# Patient Record
Sex: FEMALE | Race: WHITE | Employment: OTHER | URBAN - METROPOLITAN AREA
[De-identification: names, ages, dates, MRNs, and addresses within clinical notes are randomized per-mention and may not be internally consistent; named-entity substitution may affect disease eponyms.]

---

## 2017-03-15 ENCOUNTER — PAT TELEPHONE (OUTPATIENT)
Dept: PREADMISSION TESTING | Age: 72
End: 2017-03-15

## 2017-03-15 VITALS — BODY MASS INDEX: 38.57 KG/M2 | HEIGHT: 66 IN | WEIGHT: 240 LBS

## 2017-03-16 ENCOUNTER — SURG/PROC ORDERS (OUTPATIENT)
Dept: ANESTHESIOLOGY | Age: 72
End: 2017-03-16

## 2017-03-16 RX ORDER — SODIUM CHLORIDE 0.9 % (FLUSH) 0.9 %
10 SYRINGE (ML) INJECTION PRN
Status: CANCELLED | OUTPATIENT
Start: 2017-03-16

## 2017-03-16 RX ORDER — SODIUM CHLORIDE 0.9 % (FLUSH) 0.9 %
10 SYRINGE (ML) INJECTION EVERY 12 HOURS SCHEDULED
Status: CANCELLED | OUTPATIENT
Start: 2017-03-16

## 2017-03-16 RX ORDER — SODIUM CHLORIDE 9 MG/ML
INJECTION, SOLUTION INTRAVENOUS CONTINUOUS
Status: CANCELLED | OUTPATIENT
Start: 2017-03-16

## 2017-03-17 ENCOUNTER — HOSPITAL ENCOUNTER (OUTPATIENT)
Dept: ENDOSCOPY | Age: 72
Discharge: HOME | End: 2017-03-17
Attending: INTERNAL MEDICINE | Admitting: INTERNAL MEDICINE

## 2017-03-17 VITALS
BODY MASS INDEX: 38.57 KG/M2 | OXYGEN SATURATION: 96 % | WEIGHT: 240 LBS | DIASTOLIC BLOOD PRESSURE: 68 MMHG | TEMPERATURE: 97.1 F | HEIGHT: 66 IN | SYSTOLIC BLOOD PRESSURE: 133 MMHG | HEART RATE: 69 BPM | RESPIRATION RATE: 17 BRPM

## 2017-03-17 DIAGNOSIS — K80.50 CALCULUS OF BILE DUCT WITHOUT CHOLECYSTITIS AND WITHOUT OBSTRUCTION: ICD-10-CM

## 2017-03-17 RX ORDER — SODIUM CHLORIDE 0.9 % (FLUSH) 0.9 %
10 SYRINGE (ML) INJECTION EVERY 12 HOURS SCHEDULED
Status: DISCONTINUED | OUTPATIENT
Start: 2017-03-17 | End: 2017-03-18 | Stop reason: HOSPADM

## 2017-03-17 RX ORDER — FENTANYL CITRATE 50 UG/ML
25 INJECTION, SOLUTION INTRAMUSCULAR; INTRAVENOUS EVERY 5 MIN PRN
Status: DISCONTINUED | OUTPATIENT
Start: 2017-03-17 | End: 2017-03-18 | Stop reason: HOSPADM

## 2017-03-17 RX ORDER — FENTANYL CITRATE 50 UG/ML
50 INJECTION, SOLUTION INTRAMUSCULAR; INTRAVENOUS EVERY 5 MIN PRN
Status: DISCONTINUED | OUTPATIENT
Start: 2017-03-17 | End: 2017-03-18 | Stop reason: HOSPADM

## 2017-03-17 RX ORDER — MEPERIDINE HYDROCHLORIDE 25 MG/ML
12.5 INJECTION INTRAMUSCULAR; INTRAVENOUS; SUBCUTANEOUS EVERY 5 MIN PRN
Status: DISCONTINUED | OUTPATIENT
Start: 2017-03-17 | End: 2017-03-18 | Stop reason: HOSPADM

## 2017-03-17 RX ORDER — SODIUM CHLORIDE 9 MG/ML
INJECTION, SOLUTION INTRAVENOUS CONTINUOUS
Status: DISCONTINUED | OUTPATIENT
Start: 2017-03-17 | End: 2017-03-18 | Stop reason: HOSPADM

## 2017-03-17 RX ORDER — SODIUM CHLORIDE 0.9 % (FLUSH) 0.9 %
10 SYRINGE (ML) INJECTION PRN
Status: DISCONTINUED | OUTPATIENT
Start: 2017-03-17 | End: 2017-03-18 | Stop reason: HOSPADM

## 2017-03-17 RX ORDER — MORPHINE SULFATE 2 MG/ML
2 INJECTION, SOLUTION INTRAMUSCULAR; INTRAVENOUS EVERY 5 MIN PRN
Status: DISCONTINUED | OUTPATIENT
Start: 2017-03-17 | End: 2017-03-18 | Stop reason: HOSPADM

## 2017-03-17 RX ORDER — OXYCODONE HYDROCHLORIDE AND ACETAMINOPHEN 5; 325 MG/1; MG/1
1 TABLET ORAL PRN
Status: ACTIVE | OUTPATIENT
Start: 2017-03-17 | End: 2017-03-17

## 2017-03-17 RX ORDER — MORPHINE SULFATE 2 MG/ML
1 INJECTION, SOLUTION INTRAMUSCULAR; INTRAVENOUS EVERY 5 MIN PRN
Status: DISCONTINUED | OUTPATIENT
Start: 2017-03-17 | End: 2017-03-18 | Stop reason: HOSPADM

## 2017-03-17 RX ORDER — OXYCODONE HYDROCHLORIDE AND ACETAMINOPHEN 5; 325 MG/1; MG/1
2 TABLET ORAL PRN
Status: ACTIVE | OUTPATIENT
Start: 2017-03-17 | End: 2017-03-17

## 2017-03-17 RX ORDER — ONDANSETRON 2 MG/ML
4 INJECTION INTRAMUSCULAR; INTRAVENOUS
Status: COMPLETED | OUTPATIENT
Start: 2017-03-17 | End: 2017-03-17

## 2017-03-17 RX ADMIN — FENTANYL CITRATE 25 MCG: 50 INJECTION, SOLUTION INTRAMUSCULAR; INTRAVENOUS at 14:14

## 2017-03-17 RX ADMIN — ONDANSETRON 4 MG: 2 INJECTION INTRAMUSCULAR; INTRAVENOUS at 14:05

## 2017-03-17 RX ADMIN — SODIUM CHLORIDE: 9 INJECTION, SOLUTION INTRAVENOUS at 12:00

## 2017-03-17 RX ADMIN — FENTANYL CITRATE 25 MCG: 50 INJECTION, SOLUTION INTRAMUSCULAR; INTRAVENOUS at 14:27

## 2017-03-17 ASSESSMENT — PAIN DESCRIPTION - DESCRIPTORS
DESCRIPTORS: BURNING
DESCRIPTORS: BURNING

## 2017-03-17 ASSESSMENT — PAIN SCALES - GENERAL
PAINLEVEL_OUTOF10: 0
PAINLEVEL_OUTOF10: 5
PAINLEVEL_OUTOF10: 4
PAINLEVEL_OUTOF10: 4
PAINLEVEL_OUTOF10: 3
PAINLEVEL_OUTOF10: 0

## 2017-03-17 ASSESSMENT — ENCOUNTER SYMPTOMS: SHORTNESS OF BREATH: 1

## 2017-03-17 ASSESSMENT — PAIN DESCRIPTION - PROGRESSION
CLINICAL_PROGRESSION: GRADUALLY IMPROVING
CLINICAL_PROGRESSION: GRADUALLY IMPROVING

## 2017-03-17 ASSESSMENT — PAIN DESCRIPTION - LOCATION: LOCATION: ABDOMEN;CHEST

## 2017-03-17 ASSESSMENT — PAIN - FUNCTIONAL ASSESSMENT: PAIN_FUNCTIONAL_ASSESSMENT: 0-10

## 2017-03-17 ASSESSMENT — PAIN DESCRIPTION - PAIN TYPE: TYPE: ACUTE PAIN;SURGICAL PAIN

## 2017-04-10 DIAGNOSIS — I10 ESSENTIAL HYPERTENSION: ICD-10-CM

## 2017-04-10 DIAGNOSIS — R06.02 SHORTNESS OF BREATH: ICD-10-CM

## 2017-04-10 RX ORDER — FUROSEMIDE 40 MG/1
TABLET ORAL
Qty: 90 TABLET | Refills: 0 | Status: SHIPPED | OUTPATIENT
Start: 2017-04-10 | End: 2017-05-24 | Stop reason: SDUPTHER

## 2017-04-14 RX ORDER — SERTRALINE HYDROCHLORIDE 100 MG/1
TABLET, FILM COATED ORAL
Qty: 30 TABLET | Refills: 0 | Status: SHIPPED | OUTPATIENT
Start: 2017-04-14 | End: 2017-05-24 | Stop reason: SDUPTHER

## 2017-04-20 DIAGNOSIS — E03.8 OTHER SPECIFIED HYPOTHYROIDISM: ICD-10-CM

## 2017-04-20 RX ORDER — LEVOTHYROXINE SODIUM 112 UG/1
TABLET ORAL
Qty: 30 TABLET | Refills: 0 | Status: SHIPPED | OUTPATIENT
Start: 2017-04-20 | End: 2017-05-24 | Stop reason: SDUPTHER

## 2017-04-26 DIAGNOSIS — I10 ESSENTIAL HYPERTENSION: ICD-10-CM

## 2017-04-26 RX ORDER — METOPROLOL TARTRATE 50 MG/1
TABLET, FILM COATED ORAL
Qty: 90 TABLET | Refills: 0 | Status: SHIPPED | OUTPATIENT
Start: 2017-04-26 | End: 2017-05-24 | Stop reason: SDUPTHER

## 2017-04-26 RX ORDER — LISINOPRIL 20 MG/1
TABLET ORAL
Qty: 90 TABLET | Refills: 0 | Status: SHIPPED | OUTPATIENT
Start: 2017-04-26 | End: 2017-05-24 | Stop reason: SDUPTHER

## 2017-05-24 ENCOUNTER — OFFICE VISIT (OUTPATIENT)
Dept: INTERNAL MEDICINE CLINIC | Age: 72
End: 2017-05-24

## 2017-05-24 VITALS
BODY MASS INDEX: 38.35 KG/M2 | SYSTOLIC BLOOD PRESSURE: 118 MMHG | HEART RATE: 88 BPM | WEIGHT: 238.6 LBS | HEIGHT: 66 IN | DIASTOLIC BLOOD PRESSURE: 80 MMHG

## 2017-05-24 DIAGNOSIS — E03.8 OTHER SPECIFIED HYPOTHYROIDISM: ICD-10-CM

## 2017-05-24 DIAGNOSIS — I10 ESSENTIAL HYPERTENSION: Primary | ICD-10-CM

## 2017-05-24 DIAGNOSIS — R06.02 SHORTNESS OF BREATH: ICD-10-CM

## 2017-05-24 PROCEDURE — 99214 OFFICE O/P EST MOD 30 MIN: CPT | Performed by: INTERNAL MEDICINE

## 2017-05-24 RX ORDER — LEVOTHYROXINE SODIUM 112 UG/1
112 TABLET ORAL DAILY
Qty: 90 TABLET | Refills: 1 | Status: SHIPPED | OUTPATIENT
Start: 2017-05-24 | End: 2017-05-24 | Stop reason: SDUPTHER

## 2017-05-24 RX ORDER — LISINOPRIL 20 MG/1
20 TABLET ORAL DAILY
Qty: 90 TABLET | Refills: 1 | Status: SHIPPED | OUTPATIENT
Start: 2017-05-24 | End: 2017-07-25 | Stop reason: SDUPTHER

## 2017-05-24 RX ORDER — AMITRIPTYLINE HYDROCHLORIDE 25 MG/1
TABLET, FILM COATED ORAL
Qty: 90 TABLET | Refills: 0 | Status: SHIPPED | OUTPATIENT
Start: 2017-05-24 | End: 2018-03-13 | Stop reason: SDUPTHER

## 2017-05-24 RX ORDER — METOPROLOL TARTRATE 50 MG/1
50 TABLET, FILM COATED ORAL DAILY
Qty: 90 TABLET | Refills: 1 | Status: SHIPPED | OUTPATIENT
Start: 2017-05-24 | End: 2017-07-25 | Stop reason: SDUPTHER

## 2017-05-24 RX ORDER — SERTRALINE HYDROCHLORIDE 100 MG/1
100 TABLET, FILM COATED ORAL DAILY
Qty: 90 TABLET | Refills: 1 | Status: SHIPPED | OUTPATIENT
Start: 2017-05-24 | End: 2018-03-13 | Stop reason: SDUPTHER

## 2017-05-24 RX ORDER — FUROSEMIDE 40 MG/1
40 TABLET ORAL DAILY
Qty: 90 TABLET | Refills: 1 | Status: SHIPPED | OUTPATIENT
Start: 2017-05-24 | End: 2017-07-09 | Stop reason: SDUPTHER

## 2017-05-24 RX ORDER — CLONAZEPAM 1 MG/1
1 TABLET ORAL NIGHTLY PRN
Qty: 90 TABLET | Refills: 1 | Status: SHIPPED | OUTPATIENT
Start: 2017-05-24 | End: 2018-03-13 | Stop reason: SDUPTHER

## 2017-05-24 RX ORDER — LEVOTHYROXINE SODIUM 112 UG/1
112 TABLET ORAL DAILY
Qty: 90 TABLET | Refills: 1 | Status: SHIPPED | OUTPATIENT
Start: 2017-05-24 | End: 2018-03-13 | Stop reason: SDUPTHER

## 2017-05-24 ASSESSMENT — PATIENT HEALTH QUESTIONNAIRE - PHQ9
1. LITTLE INTEREST OR PLEASURE IN DOING THINGS: 0
SUM OF ALL RESPONSES TO PHQ QUESTIONS 1-9: 0
SUM OF ALL RESPONSES TO PHQ9 QUESTIONS 1 & 2: 0
2. FEELING DOWN, DEPRESSED OR HOPELESS: 0

## 2017-05-25 LAB
ANION GAP SERPL CALCULATED.3IONS-SCNC: 16 MMOL/L (ref 3–16)
BUN BLDV-MCNC: 20 MG/DL (ref 7–20)
CALCIUM SERPL-MCNC: 9.5 MG/DL (ref 8.3–10.6)
CHLORIDE BLD-SCNC: 100 MMOL/L (ref 99–110)
CO2: 26 MMOL/L (ref 21–32)
CREAT SERPL-MCNC: 1.3 MG/DL (ref 0.6–1.2)
GFR AFRICAN AMERICAN: 49
GFR NON-AFRICAN AMERICAN: 40
GLUCOSE BLD-MCNC: 79 MG/DL (ref 70–99)
POTASSIUM SERPL-SCNC: 5 MMOL/L (ref 3.5–5.1)
SODIUM BLD-SCNC: 142 MMOL/L (ref 136–145)

## 2017-05-26 ENCOUNTER — TELEPHONE (OUTPATIENT)
Dept: RHEUMATOLOGY | Age: 72
End: 2017-05-26

## 2017-06-04 ASSESSMENT — ENCOUNTER SYMPTOMS
EYES NEGATIVE: 1
ALLERGIC/IMMUNOLOGIC NEGATIVE: 1
GASTROINTESTINAL NEGATIVE: 1
RESPIRATORY NEGATIVE: 1

## 2017-06-05 RX ORDER — POTASSIUM CHLORIDE 20 MEQ/1
TABLET, EXTENDED RELEASE ORAL
Qty: 90 TABLET | Refills: 1 | Status: SHIPPED | OUTPATIENT
Start: 2017-06-05 | End: 2017-12-02 | Stop reason: SDUPTHER

## 2017-07-09 DIAGNOSIS — R06.02 SHORTNESS OF BREATH: ICD-10-CM

## 2017-07-09 DIAGNOSIS — I10 ESSENTIAL HYPERTENSION: ICD-10-CM

## 2017-07-10 RX ORDER — FUROSEMIDE 40 MG/1
TABLET ORAL
Qty: 90 TABLET | Refills: 0 | Status: SHIPPED | OUTPATIENT
Start: 2017-07-10 | End: 2017-10-12 | Stop reason: SDUPTHER

## 2017-07-25 DIAGNOSIS — I10 ESSENTIAL HYPERTENSION: ICD-10-CM

## 2017-07-25 RX ORDER — METOPROLOL TARTRATE 50 MG/1
TABLET, FILM COATED ORAL
Qty: 90 TABLET | Refills: 0 | Status: SHIPPED | OUTPATIENT
Start: 2017-07-25 | End: 2017-10-23 | Stop reason: SDUPTHER

## 2017-07-25 RX ORDER — LISINOPRIL 20 MG/1
TABLET ORAL
Qty: 90 TABLET | Refills: 0 | Status: SHIPPED | OUTPATIENT
Start: 2017-07-25 | End: 2017-10-23 | Stop reason: SDUPTHER

## 2017-10-23 DIAGNOSIS — I10 ESSENTIAL HYPERTENSION: ICD-10-CM

## 2017-10-23 RX ORDER — METOPROLOL TARTRATE 50 MG/1
TABLET, FILM COATED ORAL
Qty: 30 TABLET | Refills: 0 | Status: SHIPPED | OUTPATIENT
Start: 2017-10-23 | End: 2018-03-13 | Stop reason: SDUPTHER

## 2017-10-23 RX ORDER — LISINOPRIL 20 MG/1
TABLET ORAL
Qty: 30 TABLET | Refills: 0 | Status: SHIPPED | OUTPATIENT
Start: 2017-10-23 | End: 2017-11-29 | Stop reason: SDUPTHER

## 2017-11-29 DIAGNOSIS — I10 ESSENTIAL HYPERTENSION: ICD-10-CM

## 2017-11-29 RX ORDER — LISINOPRIL 20 MG/1
TABLET ORAL
Qty: 30 TABLET | Refills: 0 | Status: SHIPPED | OUTPATIENT
Start: 2017-11-29 | End: 2018-03-13 | Stop reason: SDUPTHER

## 2017-12-04 RX ORDER — POTASSIUM CHLORIDE 20 MEQ/1
TABLET, EXTENDED RELEASE ORAL
Qty: 30 TABLET | Refills: 0 | Status: ON HOLD | OUTPATIENT
Start: 2017-12-04 | End: 2018-03-27 | Stop reason: SDDI

## 2018-03-13 ENCOUNTER — OFFICE VISIT (OUTPATIENT)
Dept: INTERNAL MEDICINE CLINIC | Age: 73
End: 2018-03-13

## 2018-03-13 VITALS
WEIGHT: 248 LBS | HEIGHT: 66 IN | BODY MASS INDEX: 39.86 KG/M2 | DIASTOLIC BLOOD PRESSURE: 84 MMHG | SYSTOLIC BLOOD PRESSURE: 110 MMHG | HEART RATE: 68 BPM

## 2018-03-13 DIAGNOSIS — I48.91 RAPID ATRIAL FIBRILLATION (HCC): Primary | ICD-10-CM

## 2018-03-13 DIAGNOSIS — F32.2 SEVERE SINGLE CURRENT EPISODE OF MAJOR DEPRESSIVE DISORDER, WITHOUT PSYCHOTIC FEATURES (HCC): Chronic | ICD-10-CM

## 2018-03-13 DIAGNOSIS — F41.9 CHRONIC ANXIETY: ICD-10-CM

## 2018-03-13 DIAGNOSIS — R06.02 SHORTNESS OF BREATH: ICD-10-CM

## 2018-03-13 DIAGNOSIS — I10 ESSENTIAL HYPERTENSION: ICD-10-CM

## 2018-03-13 DIAGNOSIS — I49.9 IRREGULAR HEART BEAT: ICD-10-CM

## 2018-03-13 DIAGNOSIS — E03.9 ACQUIRED HYPOTHYROIDISM: ICD-10-CM

## 2018-03-13 LAB
A/G RATIO: 2.1 (ref 1.1–2.2)
ALBUMIN SERPL-MCNC: 4.7 G/DL (ref 3.4–5)
ALP BLD-CCNC: 62 U/L (ref 40–129)
ALT SERPL-CCNC: 17 U/L (ref 10–40)
ANION GAP SERPL CALCULATED.3IONS-SCNC: 19 MMOL/L (ref 3–16)
AST SERPL-CCNC: 22 U/L (ref 15–37)
BASOPHILS ABSOLUTE: 0.1 K/UL (ref 0–0.2)
BASOPHILS RELATIVE PERCENT: 0.8 %
BILIRUB SERPL-MCNC: 0.6 MG/DL (ref 0–1)
BUN BLDV-MCNC: 23 MG/DL (ref 7–20)
CALCIUM SERPL-MCNC: 9.6 MG/DL (ref 8.3–10.6)
CHLORIDE BLD-SCNC: 98 MMOL/L (ref 99–110)
CO2: 24 MMOL/L (ref 21–32)
CREAT SERPL-MCNC: 1.2 MG/DL (ref 0.6–1.2)
EOSINOPHILS ABSOLUTE: 0.3 K/UL (ref 0–0.6)
EOSINOPHILS RELATIVE PERCENT: 4.2 %
GFR AFRICAN AMERICAN: 53
GFR NON-AFRICAN AMERICAN: 44
GLOBULIN: 2.2 G/DL
GLUCOSE BLD-MCNC: 105 MG/DL (ref 70–99)
HCT VFR BLD CALC: 44.1 % (ref 36–48)
HEMOGLOBIN: 14.6 G/DL (ref 12–16)
LYMPHOCYTES ABSOLUTE: 1.3 K/UL (ref 1–5.1)
LYMPHOCYTES RELATIVE PERCENT: 18.1 %
MCH RBC QN AUTO: 29.3 PG (ref 26–34)
MCHC RBC AUTO-ENTMCNC: 33 G/DL (ref 31–36)
MCV RBC AUTO: 88.8 FL (ref 80–100)
MONOCYTES ABSOLUTE: 0.7 K/UL (ref 0–1.3)
MONOCYTES RELATIVE PERCENT: 9.6 %
NEUTROPHILS ABSOLUTE: 4.9 K/UL (ref 1.7–7.7)
NEUTROPHILS RELATIVE PERCENT: 67.3 %
PDW BLD-RTO: 14.5 % (ref 12.4–15.4)
PLATELET # BLD: 246 K/UL (ref 135–450)
PMV BLD AUTO: 9.6 FL (ref 5–10.5)
POTASSIUM SERPL-SCNC: 4.8 MMOL/L (ref 3.5–5.1)
PRO-BNP: 3283 PG/ML (ref 0–449)
RBC # BLD: 4.97 M/UL (ref 4–5.2)
SODIUM BLD-SCNC: 141 MMOL/L (ref 136–145)
TOTAL PROTEIN: 6.9 G/DL (ref 6.4–8.2)
TSH SERPL DL<=0.05 MIU/L-ACNC: 3.96 UIU/ML (ref 0.27–4.2)
WBC # BLD: 7.2 K/UL (ref 4–11)

## 2018-03-13 PROCEDURE — 99214 OFFICE O/P EST MOD 30 MIN: CPT | Performed by: NURSE PRACTITIONER

## 2018-03-13 PROCEDURE — 93000 ELECTROCARDIOGRAM COMPLETE: CPT | Performed by: NURSE PRACTITIONER

## 2018-03-13 RX ORDER — METOPROLOL TARTRATE 50 MG/1
50 TABLET, FILM COATED ORAL DAILY
Qty: 90 TABLET | Refills: 1 | Status: SHIPPED | OUTPATIENT
Start: 2018-03-13 | End: 2018-08-07 | Stop reason: SDUPTHER

## 2018-03-13 RX ORDER — SERTRALINE HYDROCHLORIDE 100 MG/1
100 TABLET, FILM COATED ORAL DAILY
Qty: 90 TABLET | Refills: 1 | Status: SHIPPED | OUTPATIENT
Start: 2018-03-13 | End: 2018-08-07 | Stop reason: SDUPTHER

## 2018-03-13 RX ORDER — AMITRIPTYLINE HYDROCHLORIDE 25 MG/1
25 TABLET, FILM COATED ORAL NIGHTLY
Qty: 90 TABLET | Refills: 1 | Status: SHIPPED | OUTPATIENT
Start: 2018-03-13 | End: 2019-05-15 | Stop reason: SDUPTHER

## 2018-03-13 RX ORDER — CLONAZEPAM 1 MG/1
1 TABLET ORAL NIGHTLY PRN
Qty: 30 TABLET | Refills: 2 | Status: SHIPPED | OUTPATIENT
Start: 2018-03-13 | End: 2018-08-07 | Stop reason: SDUPTHER

## 2018-03-13 RX ORDER — LISINOPRIL 20 MG/1
20 TABLET ORAL DAILY
Qty: 90 TABLET | Refills: 1 | Status: SHIPPED | OUTPATIENT
Start: 2018-03-13 | End: 2018-08-07 | Stop reason: SDUPTHER

## 2018-03-13 RX ORDER — LEVOTHYROXINE SODIUM 112 UG/1
112 TABLET ORAL DAILY
Qty: 90 TABLET | Refills: 1 | Status: SHIPPED | OUTPATIENT
Start: 2018-03-13 | End: 2018-08-07 | Stop reason: SDUPTHER

## 2018-03-13 RX ORDER — FUROSEMIDE 40 MG/1
40 TABLET ORAL DAILY
Qty: 90 TABLET | Refills: 1 | Status: SHIPPED | OUTPATIENT
Start: 2018-03-13 | End: 2018-09-09 | Stop reason: SDUPTHER

## 2018-03-13 NOTE — PROGRESS NOTES
tablet; Take 1 tablet by mouth nightly TAKE 1 TABLET NIGHTLY    Chronic anxiety  - Limited Klonopin to no more than one daily  -     clonazePAM (KLONOPIN) 1 MG tablet;  Take 1 tablet by mouth nightly as needed for Anxiety for up to 90 days.  -     Drug Panel-PM-HI Res-UR Dionyp-ILEANA Spring, CNP

## 2018-03-14 ENCOUNTER — TELEPHONE (OUTPATIENT)
Dept: CARDIOLOGY CLINIC | Age: 73
End: 2018-03-14

## 2018-03-14 DIAGNOSIS — Z98.890 HISTORY OF MAZE PROCEDURE: ICD-10-CM

## 2018-03-14 DIAGNOSIS — I48.91 RAPID ATRIAL FIBRILLATION (HCC): Primary | ICD-10-CM

## 2018-03-14 ASSESSMENT — ENCOUNTER SYMPTOMS
SHORTNESS OF BREATH: 1
GASTROINTESTINAL NEGATIVE: 1

## 2018-03-14 NOTE — TELEPHONE ENCOUNTER
Please call pt and let her know new pt appt scheduled 3/19 at 1200 per RMM. Referred by Roma Enriquez for afib.

## 2018-03-16 NOTE — PROGRESS NOTES
distension, No tenderness. Musculoskeletal: No tenderness. No edema    Lymphadenopathy: Has no cervical adenopathy. Neurological: Alert and oriented. Cranial nerve appears intact, No Gross deficit   Skin: Skin is warm and dry. No rash noted. Psychiatric: Has a normal behavior         Labs, diagnostic and imaging results reviewed. Cr 1.2 (3/13/18)  ALT 17 (3/13/18)  AST 22 (3/13/18)  TSH 3.96 (3/13/18)  LDL 49 (8/2011)  Reviewed. ECG:  3/19/2018  A flutter/fib 125bpm    Echo: 10/1/15  Summary   -Normal left ventricle size, wall thickness and systolic function with an   estimated ejection fraction of 55%.   -Mild mitral regurgitation is present.   -There is mild tricuspid regurgitation with RVSP estimated at 28 mmHg. Stress echo: 10/1/15  Echo   Baseline echocardiogram shows normal LV function with an ejection fraction   of 55%. Following exercise there was uniform augmentation of all segments   with appropriate hyperdynamic response to exercise.      ECG   Normal (Negative) response to exercise. Medication:  Current Outpatient Prescriptions   Medication Sig Dispense Refill    levothyroxine (SYNTHROID) 112 MCG tablet Take 1 tablet by mouth daily 90 tablet 1    lisinopril (PRINIVIL;ZESTRIL) 20 MG tablet Take 1 tablet by mouth daily 90 tablet 1    metoprolol tartrate (LOPRESSOR) 50 MG tablet Take 1 tablet by mouth daily 90 tablet 1    furosemide (LASIX) 40 MG tablet Take 1 tablet by mouth daily (Patient taking differently: Take 40 mg by mouth as needed ) 90 tablet 1    sertraline (ZOLOFT) 100 MG tablet Take 1 tablet by mouth daily (Patient taking differently: Take 50 mg by mouth daily ) 90 tablet 1    amitriptyline (ELAVIL) 25 MG tablet Take 1 tablet by mouth nightly TAKE 1 TABLET NIGHTLY 90 tablet 1    clonazePAM (KLONOPIN) 1 MG tablet Take 1 tablet by mouth nightly as needed for Anxiety for up to 90 days.  30 tablet 2    potassium chloride (KLOR-CON M) 20 MEQ extended release tablet TAKE 1 procedure. Pt not interested in ablation at this time   Discussed amiodarone therapy, pt declines amiodarone at this time   Discussed option of cardioversion. She states that she might be in and out of rhythm and is not interested in cardioversion at this time   Discussed importance of anticoagulation. CHADSVASC 3   Start xarelto 20mg daily   Will order echo   Start digoxin 125mcg   48 hour holter ordered to evaluate rate control and arrhythmia burden. HTN   Goal <130/80   /82 (Site: Right Arm, Position: Sitting, Cuff Size: Large Adult)   Pulse 125   Ht 5' 6\" (1.676 m)   Wt 247 lb (112 kg)   BMI 39.87 kg/m²    Metoprolol 50 twice a day   Lisinopril 20 g daily    Hypothyroidism:    TSH 3.96 (3/13/18)   Management per PCP    ANGELIKA? ? She states she does not snore   Pt declines outpatient sleep study     Morbid Obesity: Body mass index is 39.87 kg/m². Weight loss discussed. - The patient is counseled to follow a low salt diet to assure blood pressure remains controlled for cardiovascular risk factor modification.   - The patient is counseled to avoid excess caffeine, and energy drinks as this may exacerbated ectopy and arrhythmia. - The patient is counseled to get regular exercise 3-5 times per week to control cardiovascular risk factors. - The patient is counseled to lose weigt to control cardiovascular risk factors. - The patient is counseled to avoid tobacco use. Follow up in 3 months    Thank you for allowing me to participate in the care of [facility]. Further evaluation will be based upon the patient's clinical course and testing results. All questions and concerns were addressed to the patient/family. Alternatives to my treatment were discussed. I have discussed the above stated plan and the patient verbalized understanding and agreed with the plan.     I, Dr. Nata Sarabia personally performed the services described in this documentation as scribed by Sharon Caceres RN in

## 2018-03-17 LAB
6-ACETYLMORPHINE: NOT DETECTED
7-AMINOCLONAZEPAM: PRESENT
ALPHA-OH-ALPRAZOLAM: NOT DETECTED
ALPRAZOLAM: NOT DETECTED
AMPHETAMINE: NOT DETECTED
BARBITURATES: NOT DETECTED
BENZOYLECGONINE: NOT DETECTED
BUPRENORPHINE: NOT DETECTED
CARISOPRODOL: NOT DETECTED
CLONAZEPAM: NOT DETECTED
CODEINE: NOT DETECTED
CREATININE URINE: 63.1 MG/DL (ref 20–400)
DIAZEPAM: NOT DETECTED
DRUGS EXPECTED: NORMAL
EER PAIN MGT DRUG PANEL, HIGH RES/EMIT U: NORMAL
ETHYL GLUCURONIDE: NOT DETECTED
FENTANYL: NOT DETECTED
HYDROCODONE: NOT DETECTED
HYDROMORPHONE: NOT DETECTED
LORAZEPAM: NOT DETECTED
MARIJUANA METABOLITE: NOT DETECTED
MDA: NOT DETECTED
MDEA: NOT DETECTED
MDMA URINE: NOT DETECTED
MEPERIDINE: NOT DETECTED
METHADONE: NOT DETECTED
METHAMPHETAMINE: NOT DETECTED
METHYLPHENIDATE: NOT DETECTED
MIDAZOLAM: NOT DETECTED
MORPHINE: NOT DETECTED
NORBUPRENORPHINE, FREE: NOT DETECTED
NORDIAZEPAM: NOT DETECTED
NORFENTANYL: NOT DETECTED
NORHYDROCODONE, URINE: NOT DETECTED
NOROXYCODONE: NOT DETECTED
NOROXYMORPHONE, URINE: NOT DETECTED
OXAZEPAM: NOT DETECTED
OXYCODONE: NOT DETECTED
OXYMORPHONE: NOT DETECTED
PAIN MANAGEMENT DRUG PANEL: NORMAL
PAIN MANAGEMENT DRUG PANEL: NORMAL
PCP: NOT DETECTED
PHENTERMINE: NOT DETECTED
PROPOXYPHENE: NOT DETECTED
TAPENTADOL, URINE: NOT DETECTED
TAPENTADOL-O-SULFATE, URINE: NOT DETECTED
TEMAZEPAM: NOT DETECTED
TRAMADOL: NOT DETECTED
ZOLPIDEM: NOT DETECTED

## 2018-03-19 ENCOUNTER — OFFICE VISIT (OUTPATIENT)
Dept: CARDIOLOGY CLINIC | Age: 73
End: 2018-03-19

## 2018-03-19 VITALS
HEART RATE: 125 BPM | HEIGHT: 66 IN | BODY MASS INDEX: 39.7 KG/M2 | DIASTOLIC BLOOD PRESSURE: 82 MMHG | WEIGHT: 247 LBS | SYSTOLIC BLOOD PRESSURE: 102 MMHG

## 2018-03-19 DIAGNOSIS — I48.0 PAROXYSMAL ATRIAL FIBRILLATION (HCC): Primary | ICD-10-CM

## 2018-03-19 DIAGNOSIS — I10 ESSENTIAL HYPERTENSION: Chronic | ICD-10-CM

## 2018-03-19 DIAGNOSIS — E03.9 HYPOTHYROIDISM, UNSPECIFIED TYPE: Chronic | ICD-10-CM

## 2018-03-19 PROCEDURE — 93000 ELECTROCARDIOGRAM COMPLETE: CPT | Performed by: INTERNAL MEDICINE

## 2018-03-19 PROCEDURE — 99204 OFFICE O/P NEW MOD 45 MIN: CPT | Performed by: INTERNAL MEDICINE

## 2018-03-19 RX ORDER — DIGOXIN 125 MCG
125 TABLET ORAL DAILY
Qty: 30 TABLET | Refills: 3 | Status: ON HOLD | OUTPATIENT
Start: 2018-03-19 | End: 2018-03-27 | Stop reason: SDDI

## 2018-03-19 NOTE — PATIENT INSTRUCTIONS
pneumococcal vaccine shot. If you have had one before, ask your doctor whether you need another dose. Get a flu shot every year. If you must be around people with colds or flu, wash your hands often. Activity  ? · If your doctor recommends it, get more exercise. Walking is a good choice. Bit by bit, increase the amount you walk every day. Try for at least 30 minutes on most days of the week. You also may want to swim, bike, or do other activities. Your doctor may suggest that you join a cardiac rehabilitation program so that you can have help increasing your physical activity safely. ? · Start light exercise if your doctor says it is okay. Even a small amount will help you get stronger, have more energy, and manage stress. Walking is an easy way to get exercise. Start out by walking a little more than you did in the hospital. Gradually increase the amount you walk. ? · When you exercise, watch for signs that your heart is working too hard. You are pushing too hard if you cannot talk while you are exercising. If you become short of breath or dizzy or have chest pain, sit down and rest immediately. ? · Check your pulse regularly. Place two fingers on the artery at the palm side of your wrist, in line with your thumb. If your heartbeat seems uneven or fast, talk to your doctor. When should you call for help? Call 911 anytime you think you may need emergency care. For example, call if:  ? · You have symptoms of a heart attack. These may include:  ¨ Chest pain or pressure, or a strange feeling in the chest.  ¨ Sweating. ¨ Shortness of breath. ¨ Nausea or vomiting. ¨ Pain, pressure, or a strange feeling in the back, neck, jaw, or upper belly or in one or both shoulders or arms. ¨ Lightheadedness or sudden weakness. ¨ A fast or irregular heartbeat. After you call 911, the  may tell you to chew 1 adult-strength or 2 to 4 low-dose aspirin. Wait for an ambulance. Do not try to drive yourself.    ? · You have symptoms of a stroke. These may include:  ¨ Sudden numbness, tingling, weakness, or loss of movement in your face, arm, or leg, especially on only one side of your body. ¨ Sudden vision changes. ¨ Sudden trouble speaking. ¨ Sudden confusion or trouble understanding simple statements. ¨ Sudden problems with walking or balance. ¨ A sudden, severe headache that is different from past headaches. ? · You passed out (lost consciousness). ?Call your doctor now or seek immediate medical care if:  ? · You have new or increased shortness of breath. ? · You feel dizzy or lightheaded, or you feel like you may faint. ? · Your heart rate becomes irregular. ? · You can feel your heart flutter in your chest or skip heartbeats. Tell your doctor if these symptoms are new or worse. ? Watch closely for changes in your health, and be sure to contact your doctor if you have any problems. Where can you learn more? Go to [email or web address] and sign in to your [email or web address] account. Enter U020 in the Smart Adventure box to learn more about \"Atrial Fibrillation: Care Instructions. \"     If you do not have an account, please click on the \"Sign Up Now\" link. Current as of: September 21, 2016  Content Version: 11.5  © 9723-3113 Healthwise, Incorporated. Care instructions adapted under license by [facility] ([facility]). If you have questions about a medical condition or this instruction, always ask your healthcare professional. [place] [address] any warranty or liability for your use of this information.

## 2018-03-20 ENCOUNTER — OFFICE VISIT (OUTPATIENT)
Dept: INTERNAL MEDICINE CLINIC | Age: 73
End: 2018-03-20

## 2018-03-20 VITALS
BODY MASS INDEX: 39.7 KG/M2 | HEIGHT: 66 IN | HEART RATE: 100 BPM | DIASTOLIC BLOOD PRESSURE: 62 MMHG | SYSTOLIC BLOOD PRESSURE: 100 MMHG | WEIGHT: 247 LBS

## 2018-03-20 DIAGNOSIS — E03.9 ACQUIRED HYPOTHYROIDISM: Chronic | ICD-10-CM

## 2018-03-20 DIAGNOSIS — I10 ESSENTIAL HYPERTENSION: Chronic | ICD-10-CM

## 2018-03-20 DIAGNOSIS — I48.0 PAROXYSMAL ATRIAL FIBRILLATION (HCC): Primary | ICD-10-CM

## 2018-03-20 PROCEDURE — 99213 OFFICE O/P EST LOW 20 MIN: CPT | Performed by: NURSE PRACTITIONER

## 2018-03-20 ASSESSMENT — ENCOUNTER SYMPTOMS
GASTROINTESTINAL NEGATIVE: 1
SHORTNESS OF BREATH: 1

## 2018-03-21 NOTE — PROGRESS NOTES
(KLOR-CON M) 20 MEQ extended release tablet TAKE 1 TABLET DAILY 30 tablet 0    vitamin D (CHOLECALCIFEROL) 1000 UNIT TABS tablet Take 1,000 Units by mouth daily      therapeutic multivitamin-minerals (THERAGRAN-M) tablet Take 1 tablet by mouth daily.  digoxin (LANOXIN) 125 MCG tablet Take 1 tablet by mouth daily 30 tablet 3    rivaroxaban (XARELTO) 20 MG TABS tablet Take 1 tablet by mouth daily (with breakfast) 30 tablet 3     No current facility-administered medications for this visit. Review of Systems   Constitutional: Positive for fatigue. Respiratory: Positive for shortness of breath. Cardiovascular: Positive for palpitations. Negative for chest pain. Gastrointestinal: Negative. Genitourinary: Negative. Skin: Negative. All other systems reviewed and are negative. OBJECTIVE:  Physical Exam   Constitutional: She is oriented to person, place, and time. She appears well-developed and well-nourished. No distress. HENT:   Head: Normocephalic and atraumatic. Eyes: Conjunctivae are normal. No scleral icterus. Neck: Neck supple. No JVD present. Cardiovascular: Normal rate. An irregularly irregular rhythm present. Pulmonary/Chest: Effort normal and breath sounds normal. No respiratory distress. She has no wheezes. Abdominal: Soft. She exhibits no distension. There is no tenderness. There is no rebound and no guarding. Musculoskeletal: Normal range of motion. She exhibits no edema. Neurological: She is alert and oriented to person, place, and time. Skin: Skin is warm and dry. She is not diaphoretic. Psychiatric: She has a normal mood and affect. Her behavior is normal. Thought content normal.      /62   Pulse 100 Comment: irreg  Ht 5' 6\" (1.676 m)   Wt 247 lb (112 kg)   BMI 39.87 kg/m²        ASSESSMENT/PLAN:  [facility] was seen today for follow-up.   Diagnoses and all orders for this visit:    Paroxysmal atrial fibrillation ([address]  - Seen by cards  -

## 2018-03-26 PROBLEM — I48.91 ATRIAL FIBRILLATION WITH RAPID VENTRICULAR RESPONSE (HCC): Status: ACTIVE | Noted: 2018-03-26

## 2018-03-28 PROCEDURE — 93224 XTRNL ECG REC UP TO 48 HRS: CPT | Performed by: INTERNAL MEDICINE

## 2018-03-30 ENCOUNTER — CARE COORDINATION (OUTPATIENT)
Dept: CASE MANAGEMENT | Age: 73
End: 2018-03-30

## 2018-03-30 DIAGNOSIS — B02.9 HERPES ZOSTER WITHOUT COMPLICATION: Primary | ICD-10-CM

## 2018-03-30 PROCEDURE — 1111F DSCHRG MED/CURRENT MED MERGE: CPT | Performed by: NURSE PRACTITIONER

## 2018-03-30 NOTE — CARE COORDINATION
Flex 45 Transitions Initial Follow Up Call    Call within 2 business days of discharge: Yes    Patient: Keith Sevilla Patient : 1945   MRN: [id number]  Reason for Admission: shingles  Discharge Date: 3/29/18 RARS: [facility] Risk Score: 16     Spoke with: [address] : [facility]    Non-face-to-face services provided:  Obtained and reviewed discharge summary and/or continuity of care documents    Care Transitions 24 Hour Call    Schedule Follow Up Appointment with PCP:  Declined  Do you have any ongoing symptoms?:  Yes  Patient-reported symptoms:  Pain (Comment: chest and shoulder area from shingles)  Interventions for patient-reported symptoms:  Other (Comment: expected pain)  Do you have a copy of your discharge instructions?:  Yes  Do you have all of your prescriptions and are they filled?:  Yes  Have you been contacted by a [address]?:  No  Have you scheduled your follow up appointment?:  No (Comment: will make on own)  Were you discharged with any Home Care or Post Acute Services:  No  Do you have support at home?:  Alone  Do you feel like you have everything you need to keep you well at home?:  Yes  Are you an active caregiver in your home?:  No  Care Transitions Interventions  No Identified Needs       Pt states doing pretty well, still having pain from the shingles, picked up all new meds. Reviewed all others. Politely declined this nurse's assistance in making a f/u appt with PCP, states she needs to take care of it to coincide  with her calendar. Agreed to more CTC f/u calls.       Follow Up  Future Appointments  Date Time Provider [place]   2018 1:15 PM Viktoria Calvo MD  [facility]   2018 1:30 PM Eliseo De Leon MD  [facility]   2018 2:00 PM Viktoria Calvo MD  [facility]       Vandana Hassan, RN

## 2018-04-02 ENCOUNTER — TELEPHONE (OUTPATIENT)
Dept: CARDIOLOGY CLINIC | Age: 73
End: 2018-04-02

## 2018-04-04 ENCOUNTER — CARE COORDINATION (OUTPATIENT)
Dept: CASE MANAGEMENT | Age: 73
End: 2018-04-04

## 2018-04-11 ENCOUNTER — CARE COORDINATION (OUTPATIENT)
Dept: CASE MANAGEMENT | Age: 73
End: 2018-04-11

## 2018-05-14 ENCOUNTER — OFFICE VISIT (OUTPATIENT)
Dept: CARDIOLOGY CLINIC | Age: 73
End: 2018-05-14

## 2018-05-14 VITALS
HEART RATE: 68 BPM | BODY MASS INDEX: 38.76 KG/M2 | SYSTOLIC BLOOD PRESSURE: 126 MMHG | HEIGHT: 66 IN | DIASTOLIC BLOOD PRESSURE: 70 MMHG | WEIGHT: 241.2 LBS

## 2018-05-14 DIAGNOSIS — I48.0 PAF (PAROXYSMAL ATRIAL FIBRILLATION) (HCC): Primary | ICD-10-CM

## 2018-05-14 DIAGNOSIS — E66.01 MORBID OBESITY DUE TO EXCESS CALORIES (HCC): ICD-10-CM

## 2018-05-14 DIAGNOSIS — E03.9 HYPOTHYROIDISM, UNSPECIFIED TYPE: ICD-10-CM

## 2018-05-14 DIAGNOSIS — I10 ESSENTIAL HYPERTENSION: ICD-10-CM

## 2018-05-14 DIAGNOSIS — G47.33 OSA (OBSTRUCTIVE SLEEP APNEA): ICD-10-CM

## 2018-05-14 PROCEDURE — 93000 ELECTROCARDIOGRAM COMPLETE: CPT | Performed by: INTERNAL MEDICINE

## 2018-05-14 PROCEDURE — 99214 OFFICE O/P EST MOD 30 MIN: CPT | Performed by: INTERNAL MEDICINE

## 2018-06-26 ENCOUNTER — HOSPITAL ENCOUNTER (OUTPATIENT)
Dept: NON INVASIVE DIAGNOSTICS | Age: 73
Discharge: HOME | End: 2018-06-26
Attending: INTERNAL MEDICINE | Admitting: INTERNAL MEDICINE

## 2018-06-26 DIAGNOSIS — I48.0 PAROXYSMAL ATRIAL FIBRILLATION (HCC): ICD-10-CM

## 2018-06-26 LAB
LEFT VENTRICULAR EJECTION FRACTION HIGH VALUE: 45 %
LEFT VENTRICULAR EJECTION FRACTION MODE: NORMAL
LV EF: 40 %
LV EF: 43 %
LVEF MODALITY: NORMAL

## 2018-06-27 ENCOUNTER — TELEPHONE (OUTPATIENT)
Dept: CARDIOLOGY CLINIC | Age: 73
End: 2018-06-27

## 2018-08-07 ENCOUNTER — OFFICE VISIT (OUTPATIENT)
Dept: INTERNAL MEDICINE CLINIC | Age: 73
End: 2018-08-07

## 2018-08-07 VITALS
SYSTOLIC BLOOD PRESSURE: 110 MMHG | HEART RATE: 88 BPM | WEIGHT: 242 LBS | DIASTOLIC BLOOD PRESSURE: 70 MMHG | HEIGHT: 66 IN | BODY MASS INDEX: 38.89 KG/M2

## 2018-08-07 DIAGNOSIS — I48.91 RAPID ATRIAL FIBRILLATION (HCC): Primary | ICD-10-CM

## 2018-08-07 DIAGNOSIS — I50.9 CHRONIC CONGESTIVE HEART FAILURE, UNSPECIFIED HEART FAILURE TYPE (HCC): ICD-10-CM

## 2018-08-07 DIAGNOSIS — F41.9 CHRONIC ANXIETY: ICD-10-CM

## 2018-08-07 DIAGNOSIS — I10 ESSENTIAL HYPERTENSION: ICD-10-CM

## 2018-08-07 DIAGNOSIS — F32.2 SEVERE SINGLE CURRENT EPISODE OF MAJOR DEPRESSIVE DISORDER, WITHOUT PSYCHOTIC FEATURES (HCC): Chronic | ICD-10-CM

## 2018-08-07 DIAGNOSIS — E03.9 ACQUIRED HYPOTHYROIDISM: ICD-10-CM

## 2018-08-07 DIAGNOSIS — I42.0 DILATED CARDIOMYOPATHY (HCC): ICD-10-CM

## 2018-08-07 PROCEDURE — 99214 OFFICE O/P EST MOD 30 MIN: CPT | Performed by: NURSE PRACTITIONER

## 2018-08-07 RX ORDER — METOPROLOL TARTRATE 50 MG/1
50 TABLET, FILM COATED ORAL DAILY
Qty: 90 TABLET | Refills: 1 | Status: SHIPPED | OUTPATIENT
Start: 2018-08-07 | End: 2018-09-13 | Stop reason: SDUPTHER

## 2018-08-07 RX ORDER — LISINOPRIL 20 MG/1
20 TABLET ORAL DAILY
Qty: 90 TABLET | Refills: 1 | Status: SHIPPED | OUTPATIENT
Start: 2018-08-07 | End: 2018-09-13 | Stop reason: ALTCHOICE

## 2018-08-07 RX ORDER — POTASSIUM CHLORIDE 20 MEQ/1
20 TABLET, EXTENDED RELEASE ORAL DAILY
Qty: 90 TABLET | Refills: 1 | Status: SHIPPED | OUTPATIENT
Start: 2018-08-07 | End: 2018-09-13 | Stop reason: ALTCHOICE

## 2018-08-07 RX ORDER — LEVOTHYROXINE SODIUM 112 UG/1
112 TABLET ORAL DAILY
Qty: 90 TABLET | Refills: 1 | Status: SHIPPED | OUTPATIENT
Start: 2018-08-07 | End: 2019-02-15 | Stop reason: SDUPTHER

## 2018-08-07 RX ORDER — LEVOTHYROXINE SODIUM 112 UG/1
112 TABLET ORAL DAILY
Qty: 90 TABLET | Refills: 1 | Status: SHIPPED | OUTPATIENT
Start: 2018-08-07 | End: 2018-08-07 | Stop reason: SDUPTHER

## 2018-08-07 RX ORDER — SERTRALINE HYDROCHLORIDE 100 MG/1
100 TABLET, FILM COATED ORAL DAILY
Qty: 90 TABLET | Refills: 1 | Status: SHIPPED | OUTPATIENT
Start: 2018-08-07 | End: 2019-06-13 | Stop reason: ALTCHOICE

## 2018-08-07 RX ORDER — POTASSIUM CHLORIDE 20 MEQ/1
20 TABLET, EXTENDED RELEASE ORAL DAILY
Qty: 30 TABLET | Refills: 5 | Status: SHIPPED | OUTPATIENT
Start: 2018-08-07 | End: 2018-08-07 | Stop reason: SDUPTHER

## 2018-08-07 RX ORDER — LISINOPRIL 20 MG/1
20 TABLET ORAL DAILY
Qty: 90 TABLET | Refills: 1 | Status: SHIPPED | OUTPATIENT
Start: 2018-08-07 | End: 2018-08-07 | Stop reason: SDUPTHER

## 2018-08-07 RX ORDER — DILTIAZEM HYDROCHLORIDE 180 MG/1
180 CAPSULE, COATED, EXTENDED RELEASE ORAL DAILY
Qty: 90 CAPSULE | Refills: 3 | Status: SHIPPED | OUTPATIENT
Start: 2018-08-07 | End: 2018-09-27 | Stop reason: ALTCHOICE

## 2018-08-07 RX ORDER — METOPROLOL TARTRATE 50 MG/1
50 TABLET, FILM COATED ORAL DAILY
Qty: 90 TABLET | Refills: 1 | Status: SHIPPED | OUTPATIENT
Start: 2018-08-07 | End: 2018-08-07 | Stop reason: SDUPTHER

## 2018-08-07 RX ORDER — CLONAZEPAM 1 MG/1
1 TABLET ORAL NIGHTLY PRN
Qty: 30 TABLET | Refills: 2 | Status: SHIPPED | OUTPATIENT
Start: 2018-08-07 | End: 2019-06-13 | Stop reason: SDUPTHER

## 2018-08-07 RX ORDER — SERTRALINE HYDROCHLORIDE 100 MG/1
100 TABLET, FILM COATED ORAL DAILY
Qty: 90 TABLET | Refills: 1 | Status: SHIPPED | OUTPATIENT
Start: 2018-08-07 | End: 2018-08-07 | Stop reason: SDUPTHER

## 2018-08-07 NOTE — PROGRESS NOTES
irreg  Ht 5' 6\" (1.676 m)   Wt 242 lb (109.8 kg)   BMI 39.06 kg/m²      PHQ Scores 5/24/2017 4/16/2014   PHQ2 Score 0 0   PHQ9 Score 0 0     Interpretation of Total Score Depression Severity: 1-4 = Minimal depression, 5-9 = Mild depression, 10-14 = Moderate depression, 15-19 = Moderately severe depression, 20-27 = Severe depression        ASSESSMENT/PLAN:  Noah Childress was seen today for medication refill. Diagnoses and all orders for this visit:    Rapid [facility] fibrillation ([facility]  -     Heart rate 100-130, irregular here today  - Increase diltiazem, continue anticoagulation. Strongly recommended follow-up with cardiology  -     diltiazem (CARDIZEM CD) 180 MG extended release capsule; Take 1 capsule by mouth daily  -     metoprolol tartrate (LOPRESSOR) 50 MG tablet; Take 1 tablet by mouth daily    Chronic congestive heart failure, unspecified heart failure type (HCC)  - Elevated BNP, decreased EF.  - In reviewing EP's note, they wanted her to see Dr. Chalino Alexis. The patient did not schedule this. I will reorder the referral.  -     Souleymane Howell MD    Essential hypertension  - Normotensive  - she has met JNC standards.  - Continue current regimen. -     lisinopril (PRINIVIL;ZESTRIL) 20 MG tablet; Take 1 tablet by mouth daily  -     metoprolol tartrate (LOPRESSOR) 50 MG tablet; Take 1 tablet by mouth daily  -     potassium chloride (KLOR-CON M) 20 MEQ extended release tablet; Take 1 tablet by mouth daily    Dilated cardiomyopathy (HCC)  - Controlled blood pressure  - Follow up with cardiology    Acquired hypothyroidism  -     levothyroxine (SYNTHROID) 112 MCG tablet; Take 1 tablet by mouth daily    Chronic anxiety  - Limited benzodiazepine to no more than once daily  -     clonazePAM (KLONOPIN) 1 MG tablet; Take 1 tablet by mouth nightly as needed for Anxiety for up to 90 days. .    Severe single current episode of major depressive disorder, without psychotic features ([facility]  -     sertraline (ZOLOFT) 100 MG tablet;  Take 1 tablet by mouth daily        Sofia Ventura, APRN - CNP

## 2018-08-07 NOTE — LETTER
[facility]  [address] [place] [address]  Phone: [phone]  Fax: [phone]    CARLIE Marquis CNP        August 7, 2018     Patient: Eduard Ramesh   YOB: 1945   Date of Visit: 8/7/2018       To Whom It May Concern: It is my medical opinion that Eduard Ramesh requires a disability parking placard for the following reasons:  She cannot walk 200 feet without stopping to rest.  Duration of need: permanent    If you have any questions or concerns, please don't hesitate to call.     Sincerely,        CARLIE Marquis CNP

## 2018-08-09 PROBLEM — F32.2 SEVERE SINGLE CURRENT EPISODE OF MAJOR DEPRESSIVE DISORDER, WITHOUT PSYCHOTIC FEATURES (HCC): Chronic | Status: ACTIVE | Noted: 2018-08-09

## 2018-08-09 PROBLEM — F41.9 CHRONIC ANXIETY: Status: ACTIVE | Noted: 2018-08-09

## 2018-08-09 PROBLEM — I50.9 CHRONIC CONGESTIVE HEART FAILURE (HCC): Status: ACTIVE | Noted: 2018-08-09

## 2018-08-09 PROBLEM — I10 ESSENTIAL HYPERTENSION: Status: ACTIVE | Noted: 2018-08-09

## 2018-08-09 ASSESSMENT — ENCOUNTER SYMPTOMS
GASTROINTESTINAL NEGATIVE: 1
SHORTNESS OF BREATH: 1

## 2018-09-09 DIAGNOSIS — I10 ESSENTIAL HYPERTENSION: ICD-10-CM

## 2018-09-09 DIAGNOSIS — R06.02 SHORTNESS OF BREATH: ICD-10-CM

## 2018-09-10 RX ORDER — FUROSEMIDE 40 MG/1
TABLET ORAL
Qty: 90 TABLET | Refills: 1 | Status: SHIPPED | OUTPATIENT
Start: 2018-09-10 | End: 2018-09-13 | Stop reason: ALTCHOICE

## 2018-09-13 ENCOUNTER — OFFICE VISIT (OUTPATIENT)
Dept: CARDIOLOGY CLINIC | Age: 73
End: 2018-09-13

## 2018-09-13 VITALS
WEIGHT: 240.8 LBS | DIASTOLIC BLOOD PRESSURE: 62 MMHG | HEIGHT: 66 IN | BODY MASS INDEX: 38.7 KG/M2 | OXYGEN SATURATION: 95 % | HEART RATE: 87 BPM | SYSTOLIC BLOOD PRESSURE: 108 MMHG | RESPIRATION RATE: 16 BRPM

## 2018-09-13 DIAGNOSIS — I10 ESSENTIAL HYPERTENSION: ICD-10-CM

## 2018-09-13 DIAGNOSIS — R06.02 SOB (SHORTNESS OF BREATH): Primary | ICD-10-CM

## 2018-09-13 DIAGNOSIS — I48.91 RAPID ATRIAL FIBRILLATION (HCC): ICD-10-CM

## 2018-09-13 DIAGNOSIS — I50.42 CHRONIC COMBINED SYSTOLIC (CONGESTIVE) AND DIASTOLIC (CONGESTIVE) HEART FAILURE (HCC): ICD-10-CM

## 2018-09-13 PROCEDURE — 99204 OFFICE O/P NEW MOD 45 MIN: CPT | Performed by: INTERNAL MEDICINE

## 2018-09-13 RX ORDER — METOPROLOL TARTRATE 50 MG/1
50 TABLET, FILM COATED ORAL DAILY
Qty: 90 TABLET | Refills: 3 | Status: SHIPPED | OUTPATIENT
Start: 2018-09-13 | End: 2019-12-09 | Stop reason: SDUPTHER

## 2018-09-13 RX ORDER — FUROSEMIDE 20 MG/1
20 TABLET ORAL DAILY
Qty: 90 TABLET | Refills: 3 | Status: SHIPPED | OUTPATIENT
Start: 2018-09-13 | End: 2018-10-18 | Stop reason: SDUPTHER

## 2018-09-13 RX ORDER — SPIRONOLACTONE 25 MG/1
12.5 TABLET ORAL DAILY
Qty: 90 TABLET | Refills: 3 | Status: SHIPPED | OUTPATIENT
Start: 2018-09-13 | End: 2018-09-27 | Stop reason: SDUPTHER

## 2018-09-13 RX ORDER — LISINOPRIL 10 MG/1
10 TABLET ORAL DAILY
Qty: 90 TABLET | Refills: 3 | Status: SHIPPED | OUTPATIENT
Start: 2018-09-13 | End: 2019-07-25 | Stop reason: SDUPTHER

## 2018-09-13 NOTE — PROGRESS NOTES
[facility]   Advanced Heart Failure/Pulmonary Hypertension  Cardiac Evaluation      Ale Schulte  YOB: 1945    Date of Visit:  9/13/18      Chief Complaint   Patient presents with    Congestive Heart Failure     Denies chest pain, has pressure    Atrial Fibrillation    Hypertension    Dizziness     when she first gets up; then okay    Edema     bilateral ankles; moreso in left    Fatigue    Shortness of Breath     with and withou exertion at times        History of Present Illness:  Ale Schulte is a 75 y.o. female who presents from referral from Lucía Ac NP for consultation and management of congestive heart failure. Her heart rate has been in the low 100s. This will sometimes go as high as the 130s. She has been feeling \"terrible lately\" and wonders if her heart rate has to do with this. She denies any chest pain or shortness of breath. She did not contact her cardiologist about this. She is a patient of electrophysiology. Due to a elevated BNP and reduced EF on her most recent echo, her electrophysiologist wants her to get care for a referral to me but the patient has not followed up on this yet. Today, she reports that over the last few months she has been short of breath and it has been getting progressively worse. She has also been  fatigue and it been hard to function. She has had to take metoprolol more than once walt because her HR, has been too high. She has swelling in the legs. She I staking Diltiazem, metoprolol, lisinopril, lasix  Her BP is running low, she usually doesn't run this low. We discussed amiodarone as an alternative for her HR. She has a history of minimaze surgery by Dr. Miah Lindsay with removal of left atrial appendage 11 years ago. She knows a lot about atrial fib management. She does not want to use amiodarone. She has been taking 3-4 metoprolol a day to control heart rate.   She definitely feels better when she is in NSR      Allergies   Allergen Reactions    Benadryl [Diphenhydramine]      Accelerated heart rate     Current Outpatient Prescriptions   Medication Sig Dispense Refill    furosemide (LASIX) 40 MG tablet TAKE 1 TABLET DAILY 90 tablet 1    diltiazem (CARDIZEM CD) 180 MG extended release capsule Take 1 capsule by mouth daily 90 capsule 3    clonazePAM (KLONOPIN) 1 MG tablet Take 1 tablet by mouth nightly as needed for Anxiety for up to 90 days. . 30 tablet 2    levothyroxine (SYNTHROID) 112 MCG tablet Take 1 tablet by mouth daily 90 tablet 1    metoprolol tartrate (LOPRESSOR) 50 MG tablet Take 1 tablet by mouth daily (Patient taking differently: Take 50 mg by mouth daily ) 90 tablet 1    potassium chloride (KLOR-CON M) 20 MEQ extended release tablet Take 1 tablet by mouth daily 90 tablet 1    sertraline (ZOLOFT) 100 MG tablet Take 1 tablet by mouth daily (Patient taking differently: Take 50 mg by mouth daily ) 90 tablet 1    rivaroxaban (XARELTO) 20 MG TABS tablet Take 1 tablet by mouth daily (with breakfast) 30 tablet 3    lisinopril (PRINIVIL;ZESTRIL) 20 MG tablet Take 1 tablet by mouth daily 90 tablet 1    amitriptyline (ELAVIL) 25 MG tablet Take 1 tablet by mouth nightly TAKE 1 TABLET NIGHTLY (Patient taking differently: Take 25 mg by mouth nightly as needed TAKE 1 TABLET NIGHTLY) 90 tablet 1     No current facility-administered medications for this visit.         Past Medical History:   Diagnosis Date    Arthritis     Asthma     Atrial fibrillation ([address] 8/9/2011    Depression     Diverticulitis     DVT     post partum    GERD (gastroesophageal reflux disease)     Hx of blood clots     leg    Hypertension     Insomnia     Other disorders of kidney and ureter     hypothyroid    Pneumonia 11/7/2011    Shingles     Thyroid disease      Past Surgical History:   Procedure Laterality Date    CARDIAC SURGERY  2012    malachi    CHOLECYSTECTOMY, LAPAROSCOPIC  03/10/2017    with gram      Family History   Problem Relation Age of Onset    Lupus Sister     Arthritis Brother     High Blood Pressure Brother     Cancer Brother         prostate    Depression Brother     High Blood Pressure Brother     Diabetes Brother     Lupus Sister      Social History     Social History    Marital status:      Spouse name: N/A    Number of children: 3    Years of education: N/A     Occupational History    Retired      Social History Main Topics    Smoking status: Never Smoker    Smokeless tobacco: Never Used      Comment: exposed to secondhand smoke    Alcohol use Yes      Comment: rare    Drug use: No    Sexual activity: No     Other Topics Concern    Not on file     Social History Narrative    No narrative on file       Review of Systems:   · Constitutional: there has been no unanticipated weight loss. There's been no change in energy level, sleep pattern, or activity level. · Eyes: No visual changes or diplopia. No scleral icterus. · ENT: No Headaches, hearing loss or vertigo. No mouth sores or sore throat. · Cardiovascular: Reviewed in HPI  · Respiratory: No cough or wheezing, no sputum production. No hematemesis. · Gastrointestinal: No abdominal pain, appetite loss, blood in stools. No change in bowel or bladder habits. · Genitourinary: No dysuria, trouble voiding, or hematuria. · Musculoskeletal:  No gait disturbance, weakness or joint complaints. · Integumentary: No rash or pruritis. · Neurological: No headache, diplopia, change in muscle strength, numbness or tingling. No change in gait, balance, coordination, mood, affect, memory, mentation, behavior. · Psychiatric: No anxiety, no depression. · Endocrine: No malaise, fatigue or temperature intolerance. No excessive thirst, fluid intake, or urination. No tremor. · Hematologic/Lymphatic: No abnormal bruising or bleeding, blood clots or swollen lymph nodes. · Allergic/Immunologic: No nasal congestion or hives.     Physical difficult to estimate due to tachycardia. The ejection   fraction is estimated at 40-45%.  -Mild to moderate mitral regurgitation.   -Mild tricuspid regurgitation with RVSP of 33 mmHg.   Biatrial enlargement    EC2018 Sinus rhythm     KAROLINA: 3/27/17  Findings      Left Ventricle   Ejection fraction is visually estimated to be 35-40 %.     Mitral Valve   Mild to moderate mitral regurgitation.      Left Atrium   Patent foramen ovale with right-to-left shunt was visualized.   Moderately dilated left atrium.   s/p DASHAWN ligation. small space in the location of DASHAWN is present but no   connection to LA can be seen.  Servando Estrin is no evidence of mass or thrombus in the left atrium or appendage.      Aortic Valve   No evidence of aortic valve regurgitation.   The aortic valve appears tricuspid .      Aorta   Plaque is noted in the descending aorta.      Right Ventricle   Normal right ventricular size and function.      Tricuspid Valve   Mild-to-moderate tricuspid regurgitation. PASP 30 mmHg      Right Atrium   The right atrium is moderately dilated.      Pulmonic Valve   Trivial pulmonic regurgitation present.      Pericardial Effusion   No evidence of pericardial or pleural effusion.      Miscellaneous   No clot or thrombus noted in the DASHAWN. Echo: 10/1/15  Summary   -Normal left ventricle size, wall thickness and systolic function with an   estimated ejection fraction of 55%.   -Mild mitral regurgitation is present.   -There is mild tricuspid regurgitation with RVSP estimated at 28 mmHg. Stress echo: 10/1/15  Echo   Baseline echocardiogram shows normal LV function with an ejection fraction   of 55%. Following exercise there was uniform augmentation of all segments   with appropriate hyperdynamic response to exercise.      ECG   Normal (Negative) response to exercise. Assessment:  1. SOB (shortness of breath) --stable. 2. Essential hypertension--stable. 3. Rapid atrial fibrillation ([address]-- CVR.  O xarelto

## 2018-09-13 NOTE — PATIENT INSTRUCTIONS
.  Plan:  Decrease lasix to 20 mg daily. Decrease lisinopril to 10 mg daily. Stop KCL. Start spironolactone 12.5 mg daily. Labs in 10 days and weekly x 4. RTO 9/27/18 @8645. [facility] tomorrow.

## 2018-09-13 NOTE — LETTER
[address] [address] [address] [address]  Phone: [phone]  Fax: [phone]    Tiffanie Toscano MD        September 21, 2018     Chip Dakins, [address]    Patient: Sunil Cazares  MR Number: [id number]  YOB: 1945  Date of Visit: 9/13/2018      [facility]   Advanced Heart Failure/Pulmonary Hypertension  Cardiac Evaluation      Sunil Cazares  YOB: 1945    Date of Visit:  9/13/18      Chief Complaint   Patient presents with    Congestive Heart Failure     Denies chest pain, has pressure    Atrial Fibrillation    Hypertension    Dizziness     when she first gets up; then okay    Edema     bilateral ankles; moreso in left    Fatigue    Shortness of Breath     with and withou exertion at times        History of Present Illness:  Sunil Cazares is a 75 y.o. female who presents from referral from Mariaelena Hawkins NP for consultation and management of congestive heart failure. Her heart rate has been in the low 100s. This will sometimes go as high as the 130s. She has been feeling \"terrible lately\" and wonders if her heart rate has to do with this. She denies any chest pain or shortness of breath. She did not contact her cardiologist about this. She is a patient of electrophysiology. Due to a elevated BNP and reduced EF on her most recent echo, her electrophysiologist wants her to get care for a referral to me but the patient has not followed up on this yet. Today, she reports that over the last few months she has been short of breath and it has been getting progressively worse. She has also been  fatigue and it been hard to function. She has had to take metoprolol more than once walt because her HR, has been too high. She has swelling in the legs. She I staking Diltiazem, metoprolol, lisinopril, lasix  Her BP is running low, she usually doesn't run this low.  We discussed amiodarone as an alternative for her HR. She has a history of minimaze surgery by Dr. Romel Montiel with removal of left atrial appendage 11 years ago. She knows a lot about atrial fib management. She does not want to use amiodarone. She has been taking 3-4 metoprolol a day to control heart rate. She definitely feels better when she is in NSR      Allergies   Allergen Reactions    Benadryl [Diphenhydramine]      Accelerated heart rate     Current Outpatient Prescriptions   Medication Sig Dispense Refill    furosemide (LASIX) 40 MG tablet TAKE 1 TABLET DAILY 90 tablet 1    diltiazem (CARDIZEM CD) 180 MG extended release capsule Take 1 capsule by mouth daily 90 capsule 3    clonazePAM (KLONOPIN) 1 MG tablet Take 1 tablet by mouth nightly as needed for Anxiety for up to 90 days. . 30 tablet 2    levothyroxine (SYNTHROID) 112 MCG tablet Take 1 tablet by mouth daily 90 tablet 1    metoprolol tartrate (LOPRESSOR) 50 MG tablet Take 1 tablet by mouth daily (Patient taking differently: Take 50 mg by mouth daily ) 90 tablet 1    potassium chloride (KLOR-CON M) 20 MEQ extended release tablet Take 1 tablet by mouth daily 90 tablet 1    sertraline (ZOLOFT) 100 MG tablet Take 1 tablet by mouth daily (Patient taking differently: Take 50 mg by mouth daily ) 90 tablet 1    rivaroxaban (XARELTO) 20 MG TABS tablet Take 1 tablet by mouth daily (with breakfast) 30 tablet 3    lisinopril (PRINIVIL;ZESTRIL) 20 MG tablet Take 1 tablet by mouth daily 90 tablet 1    amitriptyline (ELAVIL) 25 MG tablet Take 1 tablet by mouth nightly TAKE 1 TABLET NIGHTLY (Patient taking differently: Take 25 mg by mouth nightly as needed TAKE 1 TABLET NIGHTLY) 90 tablet 1     No current facility-administered medications for this visit.         Past Medical History:   Diagnosis Date    Arthritis     Asthma     Atrial fibrillation ([facility] [address] 8/9/2011    Depression     Diverticulitis     DVT     post partum    GERD (gastroesophageal reflux disease)  Baseline echocardiogram shows normal LV function with an ejection fraction   of 55%. Following exercise there was uniform augmentation of all segments   with appropriate hyperdynamic response to exercise.      ECG   Normal (Negative) response to exercise. Assessment:  1. SOB (shortness of breath) --stable. 2. Essential hypertension--stable. 3. Rapid atrial fibrillation ([address]-- CVR. O xarelto   4. Chronic combined systolic (congestive) and diastolic (congestive) heart failure (HCC)         Plan:  Decrease lasix to 20 mg daily. Decrease lisinopril to 10 mg daily. Stop KCL. Start spironolactone 12.5 mg daily. Labs in 10 days and weekly x 4. RTO 9/27/18 @1245. [facility]. Will need \"tweaking\" of drugs to improve Class III symptoms    Scribe's attestation: This note was scribed in the presence of Camden Barreto M.D. by Dann Bahena RN    The scribe's documentation has been prepared under my direction and personally reviewed by me in its entirety. I confirm that the note above accurately reflects all work, treatment, procedures, and medical decision making performed by me. QUALITY MEASURES  1. Tobacco Cessation Counseling: NA  2. Retake of BP if >140/90: NA    3. Documentation to PCP/referring for new patient:  Sent to PCP at close of office visit  4. CAD patient on anti-platelet: NA  5. CAD patient on STATIN therapy:  NA  6. Patient with CHF and aFib on anticoagulation: Yes       I appreciate the opportunity of cooperating in the care of this patient. Camden Barreto M.D., [facility]               If you have questions, please do not hesitate to call me. I look forward to following Stuart along with you.     Sincerely,        Guanako Flores MD

## 2018-09-21 NOTE — COMMUNICATION BODY
NSR      Allergies   Allergen Reactions    Benadryl [Diphenhydramine]      Accelerated heart rate     Current Outpatient Prescriptions   Medication Sig Dispense Refill    furosemide (LASIX) 40 MG tablet TAKE 1 TABLET DAILY 90 tablet 1    diltiazem (CARDIZEM CD) 180 MG extended release capsule Take 1 capsule by mouth daily 90 capsule 3    clonazePAM (KLONOPIN) 1 MG tablet Take 1 tablet by mouth nightly as needed for Anxiety for up to 90 days. . 30 tablet 2    levothyroxine (SYNTHROID) 112 MCG tablet Take 1 tablet by mouth daily 90 tablet 1    metoprolol tartrate (LOPRESSOR) 50 MG tablet Take 1 tablet by mouth daily (Patient taking differently: Take 50 mg by mouth daily ) 90 tablet 1    potassium chloride (KLOR-CON M) 20 MEQ extended release tablet Take 1 tablet by mouth daily 90 tablet 1    sertraline (ZOLOFT) 100 MG tablet Take 1 tablet by mouth daily (Patient taking differently: Take 50 mg by mouth daily ) 90 tablet 1    rivaroxaban (XARELTO) 20 MG TABS tablet Take 1 tablet by mouth daily (with breakfast) 30 tablet 3    lisinopril (PRINIVIL;ZESTRIL) 20 MG tablet Take 1 tablet by mouth daily 90 tablet 1    amitriptyline (ELAVIL) 25 MG tablet Take 1 tablet by mouth nightly TAKE 1 TABLET NIGHTLY (Patient taking differently: Take 25 mg by mouth nightly as needed TAKE 1 TABLET NIGHTLY) 90 tablet 1     No current facility-administered medications for this visit.         Past Medical History:   Diagnosis Date    Arthritis     Asthma     Atrial fibrillation ([address] 8/9/2011    Depression     Diverticulitis     DVT     post partum    GERD (gastroesophageal reflux disease)     Hx of blood clots     leg    Hypertension     Insomnia     Other disorders of kidney and ureter     hypothyroid    Pneumonia 11/7/2011    Shingles     Thyroid disease      Past Surgical History:   Procedure Laterality Date    CARDIAC SURGERY  2012    malachi    CHOLECYSTECTOMY, LAPAROSCOPIC  03/10/2017    with gram      Family Examination:    Vitals:    09/13/18 1532 09/13/18 1535   BP: 116/64 108/62   Site: Right Upper Arm Left Upper Arm   Position: Sitting Sitting   Cuff Size: Large Adult Large Adult   Pulse: 87    Resp: 16    SpO2: 95%    Weight: 240 lb 12.8 oz (109.2 kg)    Height: 5' 6\" (1.676 m)      Body mass index is 38.87 kg/m². Wt Readings from Last 3 Encounters:   09/13/18 240 lb 12.8 oz (109.2 kg)   08/07/18 242 lb (109.8 kg)   05/14/18 241 lb 3.2 oz (109.4 kg)     BP Readings from Last 3 Encounters:   09/13/18 108/62   08/07/18 110/70   05/14/18 126/70     Constitutional and General Appearance:   WD/WN in NAD  HEENT:  NC/AT  MENDOZA  No problems with hearing  Skin:  Warm, dry  Respiratory:  · Normal excursion and expansion without use of accessory muscles  · Resp Auscultation: Normal breath sounds without dullness  Cardiovascular:  · The apical impulses not displaced  · Heart tones are crisp and normal  · Cervical veins are not engorged  · The carotid upstroke is normal in amplitude and contour without delay or bruit  · JVP less than 8 cm H2O  RRR with nl S1 and S2 without m,r,g  · Peripheral pulses are symmetrical and full  · There is no clubbing, cyanosis of the extremities. · No edema  · Femoral Arteries: 2+ and equal  · Pedal Pulses: 2+ and equal   Neck:  · No thyromegaly  Abdomen:  · No masses or tenderness  · Liver/Spleen: No Abnormalities Noted  Neurological/Psychiatric:  · Alert and oriented in all spheres  · Moves all extremities well  · Exhibits normal gait balance and coordination  · No abnormalities of mood, affect, memory, mentation, or behavior are noted      Labs, diagnostic and imaging results reviewed. Cr 1.2 (3/13/18)  ALT 17 (3/13/18)  AST 22 (3/13/18)  TSH 3.96 (3/13/18)  LDL 49 (8/2011)  Reviewed. Echo 6/26/2018     Summary   -Tachycardia.   -Normal left ventricle size. There is concentric left ventricular   hypertrophy. Overall left ventricular systolic function appears mildly   reduced, but is difficult to estimate due to tachycardia. The ejection   fraction is estimated at 40-45%.  -Mild to moderate mitral regurgitation.   -Mild tricuspid regurgitation with RVSP of 33 mmHg.   Biatrial enlargement    EC2018 Sinus rhythm     KAROLINA: 3/27/17  Findings      Left Ventricle   Ejection fraction is visually estimated to be 35-40 %.     Mitral Valve   Mild to moderate mitral regurgitation.      Left Atrium   Patent foramen ovale with right-to-left shunt was visualized.   Moderately dilated left atrium.   s/p DASHAWN ligation. small space in the location of DASHAWN is present but no   connection to LA can be seen.  Constantin Skeens is no evidence of mass or thrombus in the left atrium or appendage.      Aortic Valve   No evidence of aortic valve regurgitation.   The aortic valve appears tricuspid .      Aorta   Plaque is noted in the descending aorta.      Right Ventricle   Normal right ventricular size and function.      Tricuspid Valve   Mild-to-moderate tricuspid regurgitation. PASP 30 mmHg      Right Atrium   The right atrium is moderately dilated.      Pulmonic Valve   Trivial pulmonic regurgitation present.      Pericardial Effusion   No evidence of pericardial or pleural effusion.      Miscellaneous   No clot or thrombus noted in the DASHAWN. Echo: 10/1/15  Summary   -Normal left ventricle size, wall thickness and systolic function with an   estimated ejection fraction of 55%.   -Mild mitral regurgitation is present.   -There is mild tricuspid regurgitation with RVSP estimated at 28 mmHg. Stress echo: 10/1/15  Echo   Baseline echocardiogram shows normal LV function with an ejection fraction   of 55%. Following exercise there was uniform augmentation of all segments   with appropriate hyperdynamic response to exercise.      ECG   Normal (Negative) response to exercise. Assessment:  1. SOB (shortness of breath) --stable. 2. Essential hypertension--stable. 3. Rapid atrial fibrillation ([address]-- CVR.  O xarelto 4. Chronic combined systolic (congestive) and diastolic (congestive) heart failure (HCC)         Plan:  Decrease lasix to 20 mg daily. Decrease lisinopril to 10 mg daily. Stop KCL. Start spironolactone 12.5 mg daily. Labs in 10 days and weekly x 4. RTO 9/27/18 @1245. [facility] tomorrow. Will need \"tweaking\" of drugs to improve Class III symptoms    Scribe's attestation: This note was scribed in the presence of Tiffanie Pino M.D. by Quinten Rodriguez RN    The scribe's documentation has been prepared under my direction and personally reviewed by me in its entirety. I confirm that the note above accurately reflects all work, treatment, procedures, and medical decision making performed by me. QUALITY MEASURES  1. Tobacco Cessation Counseling: NA  2. Retake of BP if >140/90: NA    3. Documentation to PCP/referring for new patient:  Sent to PCP at close of office visit  4. CAD patient on anti-platelet: NA  5. CAD patient on STATIN therapy:  NA  6. Patient with CHF and aFib on anticoagulation: Yes       I appreciate the opportunity of cooperating in the care of this patient.     Tiffanie Pino M.D., [facility]

## 2018-09-27 ENCOUNTER — OFFICE VISIT (OUTPATIENT)
Dept: CARDIOLOGY CLINIC | Age: 73
End: 2018-09-27
Payer: MEDICARE

## 2018-09-27 ENCOUNTER — HOSPITAL ENCOUNTER (OUTPATIENT)
Age: 73
Discharge: HOME | End: 2018-09-27
Payer: MEDICARE

## 2018-09-27 VITALS
BODY MASS INDEX: 37.04 KG/M2 | SYSTOLIC BLOOD PRESSURE: 108 MMHG | HEIGHT: 67 IN | WEIGHT: 236 LBS | HEART RATE: 69 BPM | DIASTOLIC BLOOD PRESSURE: 66 MMHG

## 2018-09-27 DIAGNOSIS — I10 ESSENTIAL HYPERTENSION: ICD-10-CM

## 2018-09-27 DIAGNOSIS — I50.42 CHRONIC COMBINED SYSTOLIC (CONGESTIVE) AND DIASTOLIC (CONGESTIVE) HEART FAILURE (HCC): Primary | Chronic | ICD-10-CM

## 2018-09-27 DIAGNOSIS — E66.9 OBESITY (BMI 30-39.9): ICD-10-CM

## 2018-09-27 DIAGNOSIS — I48.91 ATRIAL FIBRILLATION, UNSPECIFIED TYPE (HCC): ICD-10-CM

## 2018-09-27 DIAGNOSIS — R06.02 SOB (SHORTNESS OF BREATH): ICD-10-CM

## 2018-09-27 DIAGNOSIS — I48.91 RAPID ATRIAL FIBRILLATION (HCC): ICD-10-CM

## 2018-09-27 DIAGNOSIS — I49.9 IRREGULAR HEART RATE: ICD-10-CM

## 2018-09-27 PROBLEM — I50.9 CHRONIC CONGESTIVE HEART FAILURE (HCC): Chronic | Status: ACTIVE | Noted: 2018-08-09

## 2018-09-27 LAB
ANION GAP SERPL CALCULATED.3IONS-SCNC: 16 MMOL/L (ref 3–16)
BUN BLDV-MCNC: 21 MG/DL (ref 7–20)
CALCIUM SERPL-MCNC: 10.1 MG/DL (ref 8.3–10.6)
CHLORIDE BLD-SCNC: 96 MMOL/L (ref 99–110)
CO2: 22 MMOL/L (ref 21–32)
CREAT SERPL-MCNC: 1.1 MG/DL (ref 0.6–1.2)
GFR AFRICAN AMERICAN: 59
GFR NON-AFRICAN AMERICAN: 48
GLUCOSE BLD-MCNC: 89 MG/DL (ref 70–99)
POTASSIUM SERPL-SCNC: 5.7 MMOL/L (ref 3.5–5.1)
PRO-BNP: 481 PG/ML (ref 0–449)
SODIUM BLD-SCNC: 134 MMOL/L (ref 136–145)

## 2018-09-27 PROCEDURE — 80048 BASIC METABOLIC PNL TOTAL CA: CPT

## 2018-09-27 PROCEDURE — 83880 ASSAY OF NATRIURETIC PEPTIDE: CPT

## 2018-09-27 PROCEDURE — 93000 ELECTROCARDIOGRAM COMPLETE: CPT | Performed by: INTERNAL MEDICINE

## 2018-09-27 PROCEDURE — 99214 OFFICE O/P EST MOD 30 MIN: CPT | Performed by: INTERNAL MEDICINE

## 2018-09-27 RX ORDER — SPIRONOLACTONE 25 MG/1
12.5 TABLET ORAL DAILY
Qty: 90 TABLET | Refills: 3 | Status: ON HOLD | OUTPATIENT
Start: 2018-09-27 | End: 2019-02-18 | Stop reason: ALTCHOICE

## 2018-09-27 NOTE — PATIENT INSTRUCTIONS
Continue lasix daily and when weight gets to 332 decrease lasix to every other day. Stop potassium, start spironolactone 12.5 mg daily. Rx reordered through escribe. Labs today and in 2 weeks bnp, bmp. May consider trying carvedilol. Follow up in 3 weeks. October 18th.  Will need \"tweaking\" of drugs to improve Class III symptoms

## 2018-09-27 NOTE — PROGRESS NOTES
furosemide (LASIX) 20 MG tablet Take 1 tablet by mouth daily 90 tablet 3    clonazePAM (KLONOPIN) 1 MG tablet Take 1 tablet by mouth nightly as needed for Anxiety for up to 90 days. . 30 tablet 2    levothyroxine (SYNTHROID) 112 MCG tablet Take 1 tablet by mouth daily 90 tablet 1    sertraline (ZOLOFT) 100 MG tablet Take 1 tablet by mouth daily (Patient taking differently: Take 50 mg by mouth daily ) 90 tablet 1    amitriptyline (ELAVIL) 25 MG tablet Take 1 tablet by mouth nightly TAKE 1 TABLET NIGHTLY (Patient taking differently: Take 25 mg by mouth nightly as needed TAKE 1 TABLET NIGHTLY) 90 tablet 1    spironolactone (ALDACTONE) 25 MG tablet Take 0.5 tablets by mouth daily 90 tablet 3    diltiazem (CARDIZEM CD) 180 MG extended release capsule Take 1 capsule by mouth daily 90 capsule 3     No current facility-administered medications for this visit. Past Medical History:   Diagnosis Date    Arthritis     Asthma     Atrial fibrillation ([facility] [address] 8/9/2011    Depression     Diverticulitis     DVT     post partum    GERD (gastroesophageal reflux disease)     Hx of blood clots     leg    Hypertension     Insomnia     Other disorders of kidney and ureter     hypothyroid    Pneumonia 11/7/2011    Shingles     Thyroid disease      Past Surgical History:   Procedure Laterality Date    CARDIAC SURGERY  2012    minimaze    CARDIOVERSION  09/2018    CHOLECYSTECTOMY, LAPAROSCOPIC  03/10/2017    with gram      Family History   Problem Relation Age of Onset    Lupus Sister     Arthritis Brother     High Blood Pressure Brother     Cancer Brother         prostate    Depression Brother     High Blood Pressure Brother     Diabetes Brother     Lupus Sister      Social History     Social History    Marital status:       Spouse name: N/A    Number of children: 3    Years of education: N/A     Occupational History    Retired      Social History Main Topics    Smoking status: Never Smoker    dry  Respiratory:  · Normal excursion and expansion without use of accessory muscles  · Resp Auscultation: Normal breath sounds without dullness  Cardiovascular:  · The apical impulses not displaced  · Heart tones are crisp and normal  · Cervical veins are not engorged  · The carotid upstroke is normal in amplitude and contour without delay or bruit  · JVP less than 8 cm H2O  RRR with nl S1 and S2 without m,r,g  · Peripheral pulses are symmetrical and full  · There is no clubbing, cyanosis of the extremities. · No edema  · Femoral Arteries: 2+ and equal  · Pedal Pulses: 2+ and equal   Neck:  · No thyromegaly  Abdomen:  · No masses or tenderness  · Liver/Spleen: No Abnormalities Noted  Neurological/Psychiatric:  · Alert and oriented in all spheres  · Moves all extremities well  · Exhibits normal gait balance and coordination  · No abnormalities of mood, affect, memory, mentation, or behavior are noted      Labs, diagnostic and imaging results reviewed. Cr 1.2 (3/13/18)  ALT 17 (3/13/18)  AST 22 (3/13/18)  TSH 3.96 (3/13/18)  LDL 49 (2011)  Reviewed. Afib DCCV 2018   MXA MFF    Echo 2018     Summary   -Tachycardia.   -Normal left ventricle size. There is concentric left ventricular   hypertrophy. Overall left ventricular systolic function appears mildly   reduced, but is difficult to estimate due to tachycardia. The ejection   fraction is estimated at 40-45%.  -Mild to moderate mitral regurgitation.   -Mild tricuspid regurgitation with RVSP of 33 mmHg.   Biatrial enlargement    EC2018 Sinus rhythm     KAROLINA: 3/27/17  Findings      Left Ventricle   Ejection fraction is visually estimated to be 35-40 %.     Mitral Valve   Mild to moderate mitral regurgitation.      Left Atrium   Patent foramen ovale with right-to-left shunt was visualized.   Moderately dilated left atrium.   s/p DASHAWN ligation.  small space in the location of DASHAWN is present but no   connection to LA can be seen.  Marni Rinaldi is no

## 2018-09-28 ENCOUNTER — TELEPHONE (OUTPATIENT)
Dept: CARDIOLOGY CLINIC | Age: 73
End: 2018-09-28

## 2018-10-04 ENCOUNTER — TELEPHONE (OUTPATIENT)
Dept: CARDIAC CATH/INVASIVE PROCEDURES | Age: 73
End: 2018-10-04

## 2018-10-11 ENCOUNTER — HOSPITAL ENCOUNTER (OUTPATIENT)
Age: 73
Discharge: HOME | End: 2018-10-11
Payer: MEDICARE

## 2018-10-11 DIAGNOSIS — I48.91 RAPID ATRIAL FIBRILLATION (HCC): ICD-10-CM

## 2018-10-11 DIAGNOSIS — I48.91 ATRIAL FIBRILLATION, UNSPECIFIED TYPE (HCC): ICD-10-CM

## 2018-10-11 DIAGNOSIS — I49.9 IRREGULAR HEART RATE: ICD-10-CM

## 2018-10-11 DIAGNOSIS — I50.42 CHRONIC COMBINED SYSTOLIC (CONGESTIVE) AND DIASTOLIC (CONGESTIVE) HEART FAILURE (HCC): Chronic | ICD-10-CM

## 2018-10-11 DIAGNOSIS — I10 ESSENTIAL HYPERTENSION: ICD-10-CM

## 2018-10-11 DIAGNOSIS — R06.02 SOB (SHORTNESS OF BREATH): ICD-10-CM

## 2018-10-11 DIAGNOSIS — E66.9 OBESITY (BMI 30-39.9): ICD-10-CM

## 2018-10-11 LAB
ANION GAP SERPL CALCULATED.3IONS-SCNC: 9 MMOL/L (ref 3–16)
BUN BLDV-MCNC: 14 MG/DL (ref 7–20)
CALCIUM SERPL-MCNC: 9.7 MG/DL (ref 8.3–10.6)
CHLORIDE BLD-SCNC: 101 MMOL/L (ref 99–110)
CO2: 27 MMOL/L (ref 21–32)
CREAT SERPL-MCNC: 1.1 MG/DL (ref 0.6–1.2)
GFR AFRICAN AMERICAN: 59
GFR NON-AFRICAN AMERICAN: 48
GLUCOSE BLD-MCNC: 67 MG/DL (ref 70–99)
POTASSIUM SERPL-SCNC: 4.8 MMOL/L (ref 3.5–5.1)
PRO-BNP: 441 PG/ML (ref 0–449)
SODIUM BLD-SCNC: 137 MMOL/L (ref 136–145)

## 2018-10-11 PROCEDURE — 36415 COLL VENOUS BLD VENIPUNCTURE: CPT

## 2018-10-11 PROCEDURE — 80048 BASIC METABOLIC PNL TOTAL CA: CPT

## 2018-10-11 PROCEDURE — 83880 ASSAY OF NATRIURETIC PEPTIDE: CPT

## 2018-10-11 RX ORDER — POTASSIUM CHLORIDE 1500 MG/1
TABLET, EXTENDED RELEASE ORAL
Qty: 30 TABLET | Refills: 1 | Status: ON HOLD | OUTPATIENT
Start: 2018-10-11 | End: 2019-02-18 | Stop reason: ALTCHOICE

## 2018-10-18 ENCOUNTER — OFFICE VISIT (OUTPATIENT)
Dept: CARDIOLOGY CLINIC | Age: 73
End: 2018-10-18
Payer: MEDICARE

## 2018-10-18 VITALS
HEART RATE: 76 BPM | HEIGHT: 67 IN | WEIGHT: 237 LBS | DIASTOLIC BLOOD PRESSURE: 68 MMHG | SYSTOLIC BLOOD PRESSURE: 96 MMHG | BODY MASS INDEX: 37.2 KG/M2

## 2018-10-18 DIAGNOSIS — I48.91 RAPID ATRIAL FIBRILLATION (HCC): ICD-10-CM

## 2018-10-18 DIAGNOSIS — I10 ESSENTIAL HYPERTENSION: ICD-10-CM

## 2018-10-18 DIAGNOSIS — R06.02 SOB (SHORTNESS OF BREATH): ICD-10-CM

## 2018-10-18 DIAGNOSIS — E66.9 OBESITY (BMI 30-39.9): ICD-10-CM

## 2018-10-18 DIAGNOSIS — I50.42 CHRONIC COMBINED SYSTOLIC (CONGESTIVE) AND DIASTOLIC (CONGESTIVE) HEART FAILURE (HCC): Primary | Chronic | ICD-10-CM

## 2018-10-18 DIAGNOSIS — I48.91 ATRIAL FIBRILLATION, UNSPECIFIED TYPE (HCC): ICD-10-CM

## 2018-10-18 PROCEDURE — 99214 OFFICE O/P EST MOD 30 MIN: CPT | Performed by: INTERNAL MEDICINE

## 2018-10-18 RX ORDER — FUROSEMIDE 20 MG/1
TABLET ORAL
Qty: 120 TABLET | Refills: 3 | Status: SHIPPED | OUTPATIENT
Start: 2018-10-18 | End: 2018-12-06 | Stop reason: SDUPTHER

## 2018-10-18 NOTE — PATIENT INSTRUCTIONS
Take lasix 40 mg 2-3 days a week and 20 mg all other days. Bnp, Bmp before next visit. RTO in 6 weeks.

## 2018-10-18 NOTE — PROGRESS NOTES
[address]   Advanced Heart Failure/Pulmonary Hypertension  Cardiac Evaluation      Tahir Palacios  YOB: 1945    Date of Visit:  10/18/18    Chief Complaint   Patient presents with    Congestive Heart Failure    Shortness of Breath     sleeps on two pillows      History of Present Illness:  Tahir Palacios is a 75 y.o. female who presents from referral from Chantal Funez NP for consultation and management of congestive heart failure. When I saw her last week, she felt terrible and was in atrial fib with RVR. She is a patient of electrophysiology. She had a CV on 9/14 and her ekg at follow up visit showed NSR. She felt better since converted to NSR. She has had an ablation in the past but, doesn't want another one unless it is completely necessary. Her baseline weight at home has been 334 pounds. Her diltiazem has been discontinued. Today, she reports that she is still sob with activity. Last visit we added spironolactone , we reviewed her recent labs and re potassium is good. Her BP, HR is good. She is taking lasix 20 mg now. Her weight was 235 pounds at home and hasn't taken the lasix today. No CP or pressure. Has been having indigestion lately, acid reflux, starts in the evening. She has been taking an OTC probiotic and it helps. She remains on thyroid medication for her thyroid disease, same dose. She starts gasping for breath. We reviewed her labs and they look good. She remains on xarelto and metoprolol and her ventricular rate has been controlled. She definitely feels better when she is in NSR.        Allergies   Allergen Reactions    Benadryl [Diphenhydramine]      Accelerated heart rate     Current Outpatient Prescriptions   Medication Sig Dispense Refill    spironolactone (ALDACTONE) 25 MG tablet Take 0.5 tablets by mouth daily 90 tablet 3    metoprolol tartrate (LOPRESSOR) 50 MG tablet Take 1 tablet by mouth daily (Patient taking differently: Take 25 mg by mouth NA  2. Retake of BP if >140/90: NA    3. Documentation to PCP/referring for new patient:  Sent to PCP at close of office visit  4. CAD patient on anti-platelet: NA  5. CAD patient on STATIN therapy:  NA  6. Patient with CHF and aFib on anticoagulation: Yes       I appreciate the opportunity of cooperating in the care of this patient.     Emma Michael M.D., [facility] [place]

## 2018-10-31 ENCOUNTER — OFFICE VISIT (OUTPATIENT)
Dept: CARDIOLOGY CLINIC | Age: 73
End: 2018-10-31
Payer: MEDICARE

## 2018-10-31 VITALS
BODY MASS INDEX: 38.54 KG/M2 | HEART RATE: 74 BPM | DIASTOLIC BLOOD PRESSURE: 80 MMHG | WEIGHT: 239.8 LBS | SYSTOLIC BLOOD PRESSURE: 130 MMHG | HEIGHT: 66 IN

## 2018-10-31 DIAGNOSIS — I50.9 CHRONIC CONGESTIVE HEART FAILURE, UNSPECIFIED HEART FAILURE TYPE (HCC): Chronic | ICD-10-CM

## 2018-10-31 DIAGNOSIS — I42.0 DILATED CARDIOMYOPATHY (HCC): Chronic | ICD-10-CM

## 2018-10-31 DIAGNOSIS — I48.91 ATRIAL FIBRILLATION, UNSPECIFIED TYPE (HCC): Primary | ICD-10-CM

## 2018-10-31 DIAGNOSIS — I50.42 CHRONIC COMBINED SYSTOLIC (CONGESTIVE) AND DIASTOLIC (CONGESTIVE) HEART FAILURE (HCC): Chronic | ICD-10-CM

## 2018-10-31 DIAGNOSIS — E66.9 OBESITY (BMI 30-39.9): ICD-10-CM

## 2018-10-31 DIAGNOSIS — I10 ESSENTIAL HYPERTENSION: Chronic | ICD-10-CM

## 2018-10-31 PROCEDURE — 99214 OFFICE O/P EST MOD 30 MIN: CPT | Performed by: INTERNAL MEDICINE

## 2018-10-31 PROCEDURE — 93000 ELECTROCARDIOGRAM COMPLETE: CPT | Performed by: INTERNAL MEDICINE

## 2018-10-31 NOTE — PROGRESS NOTES
[facility]   Electrophysiology Consultation   Date: 10/31/2018  Reason for Consultation:   Consult Requesting Physician: CARLIE Palacios CNP      Chief Complaint   Patient presents with    Atrial Fibrillation    Cardiomyopathy    Hypertension        HPI: Tamara Huitron is a 75 y.o. seen post DC cardioversion 9/14/18 for recurrent atrial fibrillation post cardioversion 2/2018, hx multiple cardioversion dating back to 2011 and MAZE procedure seven years ago. Denies recurrence of atrial fib. She has been checking her pulse daily and no recurrence of Atrial fibrillation since cardioversion  Has been off xarelto due to GI side effects. Had DASHAWN closure, confirmed by KAROLINA  Has had not done sleep study    Patient seen previously by Dr. Ivy Zambrano. Admitted with headache for 1-2 days in 3/2018. Also feeling Atrial fibrillation for several days and just recently started Xarelto  She has Hx of maze and DASHAWN closure in 2011. In her last office visit she was not interested in cardioversion or ablation. She was started on xarelto. She has been feeling fatigue due to Atrial fibrillation   Past Medical History:   Diagnosis Date    Arthritis     Asthma     Atrial fibrillation ([address] 8/9/2011    Depression     Diverticulitis     DVT     post partum    GERD (gastroesophageal reflux disease)     Hx of blood clots     leg    Hypertension     Insomnia     Other disorders of kidney and ureter     hypothyroid    Pneumonia 11/7/2011    Shingles     Thyroid disease         Past Surgical History:   Procedure Laterality Date    CARDIAC SURGERY  2012    minimaze    CARDIOVERSION  09/2018    CHOLECYSTECTOMY, LAPAROSCOPIC  03/10/2017    with gram        Allergies: Allergies   Allergen Reactions    Benadryl [Diphenhydramine]      Accelerated heart rate       Social History:   reports that she has never smoked. She has never used smokeless tobacco. She reports that she drinks alcohol.  She reports that she

## 2018-11-30 ENCOUNTER — HOSPITAL ENCOUNTER (OUTPATIENT)
Age: 73
Discharge: HOME | End: 2018-11-30
Payer: MEDICARE

## 2018-11-30 DIAGNOSIS — I50.42 CHRONIC COMBINED SYSTOLIC (CONGESTIVE) AND DIASTOLIC (CONGESTIVE) HEART FAILURE (HCC): Chronic | ICD-10-CM

## 2018-11-30 DIAGNOSIS — E66.9 OBESITY (BMI 30-39.9): ICD-10-CM

## 2018-11-30 DIAGNOSIS — I10 ESSENTIAL HYPERTENSION: ICD-10-CM

## 2018-11-30 DIAGNOSIS — I48.91 RAPID ATRIAL FIBRILLATION (HCC): ICD-10-CM

## 2018-11-30 DIAGNOSIS — I48.91 ATRIAL FIBRILLATION, UNSPECIFIED TYPE (HCC): ICD-10-CM

## 2018-11-30 DIAGNOSIS — R06.02 SOB (SHORTNESS OF BREATH): ICD-10-CM

## 2018-11-30 LAB
ANION GAP SERPL CALCULATED.3IONS-SCNC: 13 MMOL/L (ref 3–16)
BUN BLDV-MCNC: 14 MG/DL (ref 7–20)
CALCIUM SERPL-MCNC: 9.6 MG/DL (ref 8.3–10.6)
CHLORIDE BLD-SCNC: 102 MMOL/L (ref 99–110)
CO2: 27 MMOL/L (ref 21–32)
CREAT SERPL-MCNC: 1.3 MG/DL (ref 0.6–1.2)
GFR AFRICAN AMERICAN: 48
GFR NON-AFRICAN AMERICAN: 40
GLUCOSE BLD-MCNC: 95 MG/DL (ref 70–99)
POTASSIUM SERPL-SCNC: 5.4 MMOL/L (ref 3.5–5.1)
PRO-BNP: 3423 PG/ML (ref 0–449)
SODIUM BLD-SCNC: 142 MMOL/L (ref 136–145)

## 2018-11-30 PROCEDURE — 36415 COLL VENOUS BLD VENIPUNCTURE: CPT

## 2018-11-30 PROCEDURE — 83880 ASSAY OF NATRIURETIC PEPTIDE: CPT

## 2018-11-30 PROCEDURE — 80048 BASIC METABOLIC PNL TOTAL CA: CPT

## 2018-12-03 ENCOUNTER — TELEPHONE (OUTPATIENT)
Dept: CARDIOLOGY CLINIC | Age: 73
End: 2018-12-03

## 2018-12-03 NOTE — TELEPHONE ENCOUNTER
----- Message from Malik Herrera MD sent at 12/2/2018  9:41 PM EST -----  Call pt. Please make sure she gets this message. Stuart, your labs show that you are likely holding water again. I suggest at least increasing your lasix to 40 mg a day until I see you later this week.   Malik Herrera

## 2018-12-06 ENCOUNTER — OFFICE VISIT (OUTPATIENT)
Dept: CARDIOLOGY CLINIC | Age: 73
End: 2018-12-06
Payer: MEDICARE

## 2018-12-06 VITALS
SYSTOLIC BLOOD PRESSURE: 98 MMHG | WEIGHT: 240 LBS | DIASTOLIC BLOOD PRESSURE: 66 MMHG | HEART RATE: 114 BPM | BODY MASS INDEX: 37.67 KG/M2 | HEIGHT: 67 IN

## 2018-12-06 DIAGNOSIS — I10 ESSENTIAL HYPERTENSION: ICD-10-CM

## 2018-12-06 DIAGNOSIS — E66.9 OBESITY (BMI 30-39.9): ICD-10-CM

## 2018-12-06 DIAGNOSIS — I48.91 RAPID ATRIAL FIBRILLATION (HCC): ICD-10-CM

## 2018-12-06 DIAGNOSIS — R06.02 SOB (SHORTNESS OF BREATH): ICD-10-CM

## 2018-12-06 DIAGNOSIS — I50.42 CHRONIC COMBINED SYSTOLIC (CONGESTIVE) AND DIASTOLIC (CONGESTIVE) HEART FAILURE (HCC): Chronic | ICD-10-CM

## 2018-12-06 DIAGNOSIS — I48.91 ATRIAL FIBRILLATION, UNSPECIFIED TYPE (HCC): Primary | ICD-10-CM

## 2018-12-06 PROCEDURE — 93000 ELECTROCARDIOGRAM COMPLETE: CPT | Performed by: INTERNAL MEDICINE

## 2018-12-06 PROCEDURE — 99215 OFFICE O/P EST HI 40 MIN: CPT | Performed by: INTERNAL MEDICINE

## 2018-12-06 RX ORDER — FUROSEMIDE 40 MG/1
TABLET ORAL
Qty: 90 TABLET | Refills: 3 | Status: SHIPPED | OUTPATIENT
Start: 2018-12-06 | End: 2019-12-09 | Stop reason: SDUPTHER

## 2018-12-06 RX ORDER — AMIODARONE HYDROCHLORIDE 200 MG/1
200 TABLET ORAL DAILY
Qty: 30 TABLET | Refills: 0 | Status: SHIPPED | OUTPATIENT
Start: 2018-12-06 | End: 2019-01-02 | Stop reason: SDUPTHER

## 2018-12-06 RX ORDER — FUROSEMIDE 40 MG/1
TABLET ORAL
Qty: 180 TABLET | Refills: 3 | Status: SHIPPED | OUTPATIENT
Start: 2018-12-06 | End: 2018-12-06 | Stop reason: ALTCHOICE

## 2018-12-07 ENCOUNTER — HOSPITAL ENCOUNTER (OUTPATIENT)
Dept: CARDIAC CATH/INVASIVE PROCEDURES | Age: 73
Discharge: HOME | End: 2018-12-07
Attending: INTERNAL MEDICINE | Admitting: INTERNAL MEDICINE
Payer: MEDICARE

## 2018-12-07 LAB
EKG ATRIAL RATE: 159 BPM
EKG ATRIAL RATE: 51 BPM
EKG DIAGNOSIS: NORMAL
EKG DIAGNOSIS: NORMAL
EKG P AXIS: 58 DEGREES
EKG P-R INTERVAL: 154 MS
EKG Q-T INTERVAL: 328 MS
EKG Q-T INTERVAL: 438 MS
EKG QRS DURATION: 82 MS
EKG QRS DURATION: 82 MS
EKG QTC CALCULATION (BAZETT): 403 MS
EKG QTC CALCULATION (BAZETT): 449 MS
EKG R AXIS: 3 DEGREES
EKG R AXIS: 8 DEGREES
EKG T AXIS: -9 DEGREES
EKG T AXIS: 13 DEGREES
EKG VENTRICULAR RATE: 113 BPM
EKG VENTRICULAR RATE: 51 BPM
GFR AFRICAN AMERICAN: 44
GFR NON-AFRICAN AMERICAN: 37
GLUCOSE BLD-MCNC: 107 MG/DL (ref 70–99)
LEFT VENTRICULAR EJECTION FRACTION HIGH VALUE: 45 %
LEFT VENTRICULAR EJECTION FRACTION MODE: NORMAL
LV EF: 40 %
LV EF: 43 %
LVEF MODALITY: NORMAL
PERFORMED ON: ABNORMAL
POC BUN: 21 MG/DL (ref 7–18)
POC CREATININE: 1.4 MG/DL (ref 0.6–1.2)
POC HEMATOCRIT: 45 % (ref 36–48)
POC POTASSIUM: 4.5 MMOL/L (ref 3.5–5.1)
POC SAMPLE TYPE: ABNORMAL
POC SODIUM: 141 MMOL/L (ref 136–145)

## 2018-12-07 PROCEDURE — 93320 DOPPLER ECHO COMPLETE: CPT | Performed by: INTERNAL MEDICINE

## 2018-12-07 PROCEDURE — 82947 ASSAY GLUCOSE BLOOD QUANT: CPT

## 2018-12-07 PROCEDURE — 84520 ASSAY OF UREA NITROGEN: CPT

## 2018-12-07 PROCEDURE — 92960 CARDIOVERSION ELECTRIC EXT: CPT | Performed by: INTERNAL MEDICINE

## 2018-12-07 PROCEDURE — 99152 MOD SED SAME PHYS/QHP 5/>YRS: CPT | Performed by: INTERNAL MEDICINE

## 2018-12-07 PROCEDURE — 93010 ELECTROCARDIOGRAM REPORT: CPT | Performed by: INTERNAL MEDICINE

## 2018-12-07 PROCEDURE — 84132 ASSAY OF SERUM POTASSIUM: CPT

## 2018-12-07 PROCEDURE — 93325 DOPPLER ECHO COLOR FLOW MAPG: CPT | Performed by: INTERNAL MEDICINE

## 2018-12-07 PROCEDURE — 2500000003 HC RX 250 WO HCPCS

## 2018-12-07 PROCEDURE — 7100000010 HC PHASE II RECOVERY - FIRST 15 MIN

## 2018-12-07 PROCEDURE — 93005 ELECTROCARDIOGRAM TRACING: CPT | Performed by: INTERNAL MEDICINE

## 2018-12-07 PROCEDURE — 84295 ASSAY OF SERUM SODIUM: CPT

## 2018-12-07 PROCEDURE — 82565 ASSAY OF CREATININE: CPT

## 2018-12-07 PROCEDURE — 85014 HEMATOCRIT: CPT

## 2018-12-07 PROCEDURE — 93312 ECHO TRANSESOPHAGEAL: CPT | Performed by: INTERNAL MEDICINE

## 2018-12-07 NOTE — H&P
brother; Cancer in her brother; Depression in her brother; Diabetes in her brother; High Blood Pressure in her brother and brother; Lupus in her sister and sister. Reviewed. Denies family history of sudden cardiac death, arrhythmia, premature CAD    Review of System:  All other systems reviewed and are negative except for that noted above. Pertinent negatives are:   General: negative for fever, chills   Ophthalmic ROS: negative for - eye pain or loss of vision  ENT ROS: negative for - headaches, sore throat   Respiratory: negative for - cough, sputum  Cardiovascular: Reviewed in HPI  Gastrointestinal: negative for - abdominal pain, diarrhea, N/V  Hematology: negative for - bleeding, blood clots, bruising or jaundice  Genito-Urinary:  negative for - Dysuria or incontinence  Musculoskeletal: negative for - Joint swelling, muscle pain  Neurological: negative for - confusion, dizziness, headaches   Psychiatric: No anxiety, no depression. Dermatological: negative for - rash    Physical Examination:  There were no vitals filed for this visit. Wt Readings from Last 3 Encounters:   12/06/18 240 lb (108.9 kg)   10/31/18 239 lb 12.8 oz (108.8 kg)   10/18/18 237 lb (107.5 kg)       · Constitutional: Oriented. No distress. · Head: Normocephalic and atraumatic. · Mouth/Throat: Oropharynx is clear and moist.   · Eyes: Conjunctivae normal. EOM are normal.   · Neck: Neck supple. No rigidity. No JVD present. · Cardiovascular: Normal rate, regular rhythm, S1&S2. · Pulmonary/Chest: Bilateral respiratory sounds. No wheezes, No rhonchi. · Abdominal: Soft. Bowel sounds present. No distension, No tenderness. · Musculoskeletal: No tenderness. No edema    · Lymphadenopathy: Has no cervical adenopathy. · Neurological: Alert and oriented. Cranial nerve appears intact, No Gross deficit   · Skin: Skin is warm and dry. No rash noted. · Psychiatric: Has a normal behavior       Labs, diagnostic and imaging results reviewed.

## 2019-01-02 DIAGNOSIS — I50.42 CHRONIC COMBINED SYSTOLIC (CONGESTIVE) AND DIASTOLIC (CONGESTIVE) HEART FAILURE (HCC): Chronic | ICD-10-CM

## 2019-01-02 DIAGNOSIS — R06.02 SOB (SHORTNESS OF BREATH): ICD-10-CM

## 2019-01-02 DIAGNOSIS — I48.91 RAPID ATRIAL FIBRILLATION (HCC): ICD-10-CM

## 2019-01-02 DIAGNOSIS — I48.91 ATRIAL FIBRILLATION, UNSPECIFIED TYPE (HCC): ICD-10-CM

## 2019-01-02 DIAGNOSIS — E66.9 OBESITY (BMI 30-39.9): ICD-10-CM

## 2019-01-02 DIAGNOSIS — I10 ESSENTIAL HYPERTENSION: ICD-10-CM

## 2019-01-07 RX ORDER — AMIODARONE HYDROCHLORIDE 200 MG/1
TABLET ORAL
Qty: 30 TABLET | Refills: 0 | Status: ON HOLD | OUTPATIENT
Start: 2019-01-07 | End: 2019-02-18 | Stop reason: ALTCHOICE

## 2019-01-09 ENCOUNTER — HOSPITAL ENCOUNTER (OUTPATIENT)
Age: 74
Discharge: HOME | End: 2019-01-09
Payer: MEDICARE

## 2019-01-09 ENCOUNTER — OFFICE VISIT (OUTPATIENT)
Dept: CARDIOLOGY CLINIC | Age: 74
End: 2019-01-09
Payer: MEDICARE

## 2019-01-09 VITALS
WEIGHT: 230.4 LBS | SYSTOLIC BLOOD PRESSURE: 114 MMHG | HEART RATE: 115 BPM | BODY MASS INDEX: 36.16 KG/M2 | DIASTOLIC BLOOD PRESSURE: 81 MMHG | HEIGHT: 67 IN

## 2019-01-09 DIAGNOSIS — I48.91 ATRIAL FIBRILLATION, UNSPECIFIED TYPE (HCC): Primary | ICD-10-CM

## 2019-01-09 DIAGNOSIS — I48.91 ATRIAL FIBRILLATION, UNSPECIFIED TYPE (HCC): ICD-10-CM

## 2019-01-09 LAB
A/G RATIO: 1.4 (ref 1.1–2.2)
ALBUMIN SERPL-MCNC: 4.4 G/DL (ref 3.4–5)
ALP BLD-CCNC: 79 U/L (ref 40–129)
ALT SERPL-CCNC: 30 U/L (ref 10–40)
ANION GAP SERPL CALCULATED.3IONS-SCNC: 12 MMOL/L (ref 3–16)
AST SERPL-CCNC: 39 U/L (ref 15–37)
BILIRUB SERPL-MCNC: 0.6 MG/DL (ref 0–1)
BUN BLDV-MCNC: 17 MG/DL (ref 7–20)
CALCIUM SERPL-MCNC: 10.2 MG/DL (ref 8.3–10.6)
CHLORIDE BLD-SCNC: 100 MMOL/L (ref 99–110)
CO2: 29 MMOL/L (ref 21–32)
CREAT SERPL-MCNC: 1.4 MG/DL (ref 0.6–1.2)
GFR AFRICAN AMERICAN: 44
GFR NON-AFRICAN AMERICAN: 37
GLOBULIN: 3.1 G/DL
GLUCOSE BLD-MCNC: 99 MG/DL (ref 70–99)
HCT VFR BLD CALC: 48 % (ref 36–48)
HEMOGLOBIN: 15.6 G/DL (ref 12–16)
MCH RBC QN AUTO: 28.9 PG (ref 26–34)
MCHC RBC AUTO-ENTMCNC: 32.5 G/DL (ref 31–36)
MCV RBC AUTO: 89 FL (ref 80–100)
PDW BLD-RTO: 14.9 % (ref 12.4–15.4)
PLATELET # BLD: 266 K/UL (ref 135–450)
PMV BLD AUTO: 9 FL (ref 5–10.5)
POTASSIUM SERPL-SCNC: 5.5 MMOL/L (ref 3.5–5.1)
RBC # BLD: 5.39 M/UL (ref 4–5.2)
SODIUM BLD-SCNC: 141 MMOL/L (ref 136–145)
TOTAL PROTEIN: 7.5 G/DL (ref 6.4–8.2)
TSH REFLEX: 1.88 UIU/ML (ref 0.27–4.2)
WBC # BLD: 7 K/UL (ref 4–11)

## 2019-01-09 PROCEDURE — 84443 ASSAY THYROID STIM HORMONE: CPT

## 2019-01-09 PROCEDURE — 80053 COMPREHEN METABOLIC PANEL: CPT

## 2019-01-09 PROCEDURE — 99214 OFFICE O/P EST MOD 30 MIN: CPT | Performed by: NURSE PRACTITIONER

## 2019-01-09 PROCEDURE — 93000 ELECTROCARDIOGRAM COMPLETE: CPT | Performed by: NURSE PRACTITIONER

## 2019-01-09 PROCEDURE — 36415 COLL VENOUS BLD VENIPUNCTURE: CPT

## 2019-01-09 PROCEDURE — 85027 COMPLETE CBC AUTOMATED: CPT

## 2019-01-14 ENCOUNTER — TELEPHONE (OUTPATIENT)
Dept: CARDIOLOGY CLINIC | Age: 74
End: 2019-01-14

## 2019-01-16 ENCOUNTER — HOSPITAL ENCOUNTER (OUTPATIENT)
Dept: CARDIAC CATH/INVASIVE PROCEDURES | Age: 74
Discharge: HOME | End: 2019-01-16
Attending: INTERNAL MEDICINE | Admitting: INTERNAL MEDICINE
Payer: MEDICARE

## 2019-01-16 VITALS — BODY MASS INDEX: 36.1 KG/M2 | HEIGHT: 67 IN | WEIGHT: 230 LBS

## 2019-01-16 LAB
EKG ATRIAL RATE: 138 BPM
EKG ATRIAL RATE: 54 BPM
EKG DIAGNOSIS: NORMAL
EKG DIAGNOSIS: NORMAL
EKG P AXIS: 64 DEGREES
EKG P-R INTERVAL: 156 MS
EKG Q-T INTERVAL: 322 MS
EKG Q-T INTERVAL: 420 MS
EKG QRS DURATION: 84 MS
EKG QRS DURATION: 90 MS
EKG QTC CALCULATION (BAZETT): 398 MS
EKG QTC CALCULATION (BAZETT): 449 MS
EKG R AXIS: 2 DEGREES
EKG R AXIS: 9 DEGREES
EKG T AXIS: -16 DEGREES
EKG T AXIS: 18 DEGREES
EKG VENTRICULAR RATE: 117 BPM
EKG VENTRICULAR RATE: 54 BPM
GFR AFRICAN AMERICAN: 44
GFR NON-AFRICAN AMERICAN: 37
GLUCOSE BLD-MCNC: 122 MG/DL (ref 70–99)
PERFORMED ON: ABNORMAL
POC BUN: 21 MG/DL (ref 7–18)
POC CREATININE: 1.4 MG/DL (ref 0.6–1.2)
POC HEMATOCRIT: 48 % (ref 36–48)
POC POTASSIUM: 4.4 MMOL/L (ref 3.5–5.1)
POC SAMPLE TYPE: ABNORMAL
POC SODIUM: 141 MMOL/L (ref 136–145)

## 2019-01-16 PROCEDURE — 82947 ASSAY GLUCOSE BLOOD QUANT: CPT

## 2019-01-16 PROCEDURE — 84132 ASSAY OF SERUM POTASSIUM: CPT

## 2019-01-16 PROCEDURE — 7100000010 HC PHASE II RECOVERY - FIRST 15 MIN

## 2019-01-16 PROCEDURE — 84295 ASSAY OF SERUM SODIUM: CPT

## 2019-01-16 PROCEDURE — 82565 ASSAY OF CREATININE: CPT

## 2019-01-16 PROCEDURE — 93005 ELECTROCARDIOGRAM TRACING: CPT | Performed by: INTERNAL MEDICINE

## 2019-01-16 PROCEDURE — 92960 CARDIOVERSION ELECTRIC EXT: CPT | Performed by: INTERNAL MEDICINE

## 2019-01-16 PROCEDURE — 93010 ELECTROCARDIOGRAM REPORT: CPT | Performed by: INTERNAL MEDICINE

## 2019-01-16 PROCEDURE — 2500000003 HC RX 250 WO HCPCS

## 2019-01-16 PROCEDURE — 85014 HEMATOCRIT: CPT

## 2019-01-16 PROCEDURE — 84520 ASSAY OF UREA NITROGEN: CPT

## 2019-01-25 DIAGNOSIS — I10 ESSENTIAL HYPERTENSION: ICD-10-CM

## 2019-01-25 RX ORDER — POTASSIUM CHLORIDE 20 MEQ/1
TABLET, EXTENDED RELEASE ORAL
Qty: 90 TABLET | Refills: 1 | Status: SHIPPED | OUTPATIENT
Start: 2019-01-25 | End: 2019-04-18 | Stop reason: SINTOL

## 2019-02-15 DIAGNOSIS — E03.9 ACQUIRED HYPOTHYROIDISM: ICD-10-CM

## 2019-02-15 RX ORDER — LEVOTHYROXINE SODIUM 112 UG/1
TABLET ORAL
Qty: 90 TABLET | Refills: 0 | Status: SHIPPED | OUTPATIENT
Start: 2019-02-15 | End: 2019-06-13 | Stop reason: SDUPTHER

## 2019-02-18 ENCOUNTER — HOSPITAL ENCOUNTER (OUTPATIENT)
Dept: CARDIAC CATH/INVASIVE PROCEDURES | Age: 74
Discharge: HOME | End: 2019-02-19
Attending: INTERNAL MEDICINE | Admitting: INTERNAL MEDICINE
Payer: MEDICARE

## 2019-02-18 ENCOUNTER — ANESTHESIA EVENT (OUTPATIENT)
Dept: CARDIAC CATH/INVASIVE PROCEDURES | Age: 74
End: 2019-02-18
Payer: MEDICARE

## 2019-02-18 ENCOUNTER — ANESTHESIA (OUTPATIENT)
Dept: CARDIAC CATH/INVASIVE PROCEDURES | Age: 74
End: 2019-02-18
Payer: MEDICARE

## 2019-02-18 VITALS
OXYGEN SATURATION: 97 % | RESPIRATION RATE: 1 BRPM | SYSTOLIC BLOOD PRESSURE: 107 MMHG | DIASTOLIC BLOOD PRESSURE: 53 MMHG | TEMPERATURE: 98.6 F

## 2019-02-18 PROBLEM — I48.0 PAF (PAROXYSMAL ATRIAL FIBRILLATION) (HCC): Status: ACTIVE | Noted: 2019-02-18

## 2019-02-18 LAB
A/G RATIO: 1.4 (ref 1.1–2.2)
ABO/RH: NORMAL
ALBUMIN SERPL-MCNC: 4.2 G/DL (ref 3.4–5)
ALP BLD-CCNC: 65 U/L (ref 40–129)
ALT SERPL-CCNC: 25 U/L (ref 10–40)
ANION GAP SERPL CALCULATED.3IONS-SCNC: 15 MMOL/L (ref 3–16)
ANTIBODY SCREEN: NORMAL
AST SERPL-CCNC: 29 U/L (ref 15–37)
BILIRUB SERPL-MCNC: 0.6 MG/DL (ref 0–1)
BUN BLDV-MCNC: 18 MG/DL (ref 7–20)
CALCIUM SERPL-MCNC: 10 MG/DL (ref 8.3–10.6)
CHLORIDE BLD-SCNC: 98 MMOL/L (ref 99–110)
CO2: 23 MMOL/L (ref 21–32)
CREAT SERPL-MCNC: 1.2 MG/DL (ref 0.6–1.2)
EKG ATRIAL RATE: 75 BPM
EKG DIAGNOSIS: NORMAL
EKG P AXIS: 65 DEGREES
EKG P-R INTERVAL: 144 MS
EKG Q-T INTERVAL: 366 MS
EKG QRS DURATION: 84 MS
EKG QTC CALCULATION (BAZETT): 408 MS
EKG R AXIS: 2 DEGREES
EKG T AXIS: 40 DEGREES
EKG VENTRICULAR RATE: 75 BPM
GFR AFRICAN AMERICAN: 53
GFR AFRICAN AMERICAN: >60
GFR NON-AFRICAN AMERICAN: 44
GFR NON-AFRICAN AMERICAN: 54
GLOBULIN: 3.1 G/DL
GLUCOSE BLD-MCNC: 115 MG/DL (ref 70–99)
GLUCOSE BLD-MCNC: 115 MG/DL (ref 70–99)
HCT VFR BLD CALC: 46.2 % (ref 36–48)
HEMOGLOBIN: 15.2 G/DL (ref 12–16)
INR BLD: 1.04 (ref 0.86–1.14)
LV EF: 43 %
LVEF MODALITY: NORMAL
MAGNESIUM: 2.2 MG/DL (ref 1.8–2.4)
MCH RBC QN AUTO: 28.7 PG (ref 26–34)
MCHC RBC AUTO-ENTMCNC: 33 G/DL (ref 31–36)
MCV RBC AUTO: 87.1 FL (ref 80–100)
PDW BLD-RTO: 14.4 % (ref 12.4–15.4)
PERFORMED ON: ABNORMAL
PLATELET # BLD: 235 K/UL (ref 135–450)
PMV BLD AUTO: 8.2 FL (ref 5–10.5)
POC ACT LR: 250 SEC
POC ACT LR: 291 SEC
POC ACT LR: 300 SEC
POC ACT LR: 335 SEC
POC ACT LR: >400 SEC
POC BUN: 16 MG/DL (ref 7–18)
POC CREATININE: 1 MG/DL (ref 0.6–1.2)
POC HEMATOCRIT: 36 % (ref 36–48)
POC POTASSIUM: 3.9 MMOL/L (ref 3.5–5.1)
POC SAMPLE TYPE: ABNORMAL
POC SODIUM: 142 MMOL/L (ref 136–145)
POTASSIUM SERPL-SCNC: 4.3 MMOL/L (ref 3.5–5.1)
PROTHROMBIN TIME: 11.8 SEC (ref 9.8–13)
RBC # BLD: 5.31 M/UL (ref 4–5.2)
SODIUM BLD-SCNC: 136 MMOL/L (ref 136–145)
TOTAL PROTEIN: 7.3 G/DL (ref 6.4–8.2)
WBC # BLD: 6.6 K/UL (ref 4–11)

## 2019-02-18 PROCEDURE — 6370000000 HC RX 637 (ALT 250 FOR IP): Performed by: ANESTHESIOLOGY

## 2019-02-18 PROCEDURE — 6360000002 HC RX W HCPCS: Performed by: NURSE ANESTHETIST, CERTIFIED REGISTERED

## 2019-02-18 PROCEDURE — 86900 BLOOD TYPING SEROLOGIC ABO: CPT

## 2019-02-18 PROCEDURE — 2580000003 HC RX 258: Performed by: NURSE ANESTHETIST, CERTIFIED REGISTERED

## 2019-02-18 PROCEDURE — 93656 COMPRE EP EVAL ABLTJ ATR FIB: CPT | Performed by: INTERNAL MEDICINE

## 2019-02-18 PROCEDURE — 2500000003 HC RX 250 WO HCPCS

## 2019-02-18 PROCEDURE — 93005 ELECTROCARDIOGRAM TRACING: CPT | Performed by: INTERNAL MEDICINE

## 2019-02-18 PROCEDURE — 2500000003 HC RX 250 WO HCPCS: Performed by: NURSE ANESTHETIST, CERTIFIED REGISTERED

## 2019-02-18 PROCEDURE — 93623 PRGRMD STIMJ&PACG IV RX NFS: CPT | Performed by: INTERNAL MEDICINE

## 2019-02-18 PROCEDURE — C1894 INTRO/SHEATH, NON-LASER: HCPCS

## 2019-02-18 PROCEDURE — 93657 TX L/R ATRIAL FIB ADDL: CPT | Performed by: INTERNAL MEDICINE

## 2019-02-18 PROCEDURE — 84520 ASSAY OF UREA NITROGEN: CPT

## 2019-02-18 PROCEDURE — 2720000010 HC SURG SUPPLY STERILE

## 2019-02-18 PROCEDURE — 7100000001 HC PACU RECOVERY - ADDTL 15 MIN

## 2019-02-18 PROCEDURE — 84295 ASSAY OF SERUM SODIUM: CPT

## 2019-02-18 PROCEDURE — 86850 RBC ANTIBODY SCREEN: CPT

## 2019-02-18 PROCEDURE — 93613 INTRACARDIAC EPHYS 3D MAPG: CPT | Performed by: INTERNAL MEDICINE

## 2019-02-18 PROCEDURE — 85347 COAGULATION TIME ACTIVATED: CPT

## 2019-02-18 PROCEDURE — 83735 ASSAY OF MAGNESIUM: CPT

## 2019-02-18 PROCEDURE — 3700000001 HC ADD 15 MINUTES (ANESTHESIA)

## 2019-02-18 PROCEDURE — 2580000003 HC RX 258: Performed by: INTERNAL MEDICINE

## 2019-02-18 PROCEDURE — 86901 BLOOD TYPING SEROLOGIC RH(D): CPT

## 2019-02-18 PROCEDURE — 2580000003 HC RX 258

## 2019-02-18 PROCEDURE — 93010 ELECTROCARDIOGRAM REPORT: CPT | Performed by: INTERNAL MEDICINE

## 2019-02-18 PROCEDURE — 93662 INTRACARDIAC ECG (ICE): CPT | Performed by: INTERNAL MEDICINE

## 2019-02-18 PROCEDURE — 6360000002 HC RX W HCPCS

## 2019-02-18 PROCEDURE — 93655 ICAR CATH ABLTJ DSCRT ARRHYT: CPT | Performed by: INTERNAL MEDICINE

## 2019-02-18 PROCEDURE — 84132 ASSAY OF SERUM POTASSIUM: CPT

## 2019-02-18 PROCEDURE — 82565 ASSAY OF CREATININE: CPT

## 2019-02-18 PROCEDURE — 85027 COMPLETE CBC AUTOMATED: CPT

## 2019-02-18 PROCEDURE — C1730 CATH, EP, 19 OR FEW ELECT: HCPCS

## 2019-02-18 PROCEDURE — 85610 PROTHROMBIN TIME: CPT

## 2019-02-18 PROCEDURE — 85014 HEMATOCRIT: CPT

## 2019-02-18 PROCEDURE — C1732 CATH, EP, DIAG/ABL, 3D/VECT: HCPCS

## 2019-02-18 PROCEDURE — 93320 DOPPLER ECHO COMPLETE: CPT | Performed by: INTERNAL MEDICINE

## 2019-02-18 PROCEDURE — C1769 GUIDE WIRE: HCPCS

## 2019-02-18 PROCEDURE — 93325 DOPPLER ECHO COLOR FLOW MAPG: CPT | Performed by: INTERNAL MEDICINE

## 2019-02-18 PROCEDURE — 3700000000 HC ANESTHESIA ATTENDED CARE

## 2019-02-18 PROCEDURE — 93312 ECHO TRANSESOPHAGEAL: CPT | Performed by: INTERNAL MEDICINE

## 2019-02-18 PROCEDURE — 80053 COMPREHEN METABOLIC PANEL: CPT

## 2019-02-18 PROCEDURE — 82947 ASSAY GLUCOSE BLOOD QUANT: CPT

## 2019-02-18 PROCEDURE — 36415 COLL VENOUS BLD VENIPUNCTURE: CPT

## 2019-02-18 PROCEDURE — C1759 CATH, INTRA ECHOCARDIOGRAPHY: HCPCS

## 2019-02-18 PROCEDURE — 6370000000 HC RX 637 (ALT 250 FOR IP): Performed by: INTERNAL MEDICINE

## 2019-02-18 PROCEDURE — 7100000000 HC PACU RECOVERY - FIRST 15 MIN

## 2019-02-18 RX ORDER — SODIUM CHLORIDE, SODIUM LACTATE, POTASSIUM CHLORIDE, CALCIUM CHLORIDE 600; 310; 30; 20 MG/100ML; MG/100ML; MG/100ML; MG/100ML
INJECTION, SOLUTION INTRAVENOUS CONTINUOUS
Status: CANCELLED | OUTPATIENT
Start: 2019-02-18

## 2019-02-18 RX ORDER — CLONAZEPAM 1 MG/1
1 TABLET ORAL NIGHTLY PRN
Status: DISCONTINUED | OUTPATIENT
Start: 2019-02-18 | End: 2019-02-19 | Stop reason: HOSPADM

## 2019-02-18 RX ORDER — ETOMIDATE 2 MG/ML
INJECTION INTRAVENOUS PRN
Status: DISCONTINUED | OUTPATIENT
Start: 2019-02-18 | End: 2019-02-18 | Stop reason: SDUPTHER

## 2019-02-18 RX ORDER — FUROSEMIDE 40 MG/1
40 TABLET ORAL DAILY
Status: DISCONTINUED | OUTPATIENT
Start: 2019-02-18 | End: 2019-02-19 | Stop reason: HOSPADM

## 2019-02-18 RX ORDER — MEPERIDINE HYDROCHLORIDE 25 MG/ML
12.5 INJECTION INTRAMUSCULAR; INTRAVENOUS; SUBCUTANEOUS EVERY 5 MIN PRN
Status: DISCONTINUED | OUTPATIENT
Start: 2019-02-18 | End: 2019-02-18

## 2019-02-18 RX ORDER — DEXAMETHASONE SODIUM PHOSPHATE 4 MG/ML
INJECTION, SOLUTION INTRA-ARTICULAR; INTRALESIONAL; INTRAMUSCULAR; INTRAVENOUS; SOFT TISSUE PRN
Status: DISCONTINUED | OUTPATIENT
Start: 2019-02-18 | End: 2019-02-18 | Stop reason: SDUPTHER

## 2019-02-18 RX ORDER — SODIUM CHLORIDE 0.9 % (FLUSH) 0.9 %
10 SYRINGE (ML) INJECTION EVERY 12 HOURS SCHEDULED
Status: CANCELLED | OUTPATIENT
Start: 2019-02-18

## 2019-02-18 RX ORDER — SODIUM CHLORIDE 9 MG/ML
INJECTION, SOLUTION INTRAVENOUS CONTINUOUS PRN
Status: DISCONTINUED | OUTPATIENT
Start: 2019-02-18 | End: 2019-02-18 | Stop reason: SDUPTHER

## 2019-02-18 RX ORDER — ONDANSETRON 2 MG/ML
INJECTION INTRAMUSCULAR; INTRAVENOUS PRN
Status: DISCONTINUED | OUTPATIENT
Start: 2019-02-18 | End: 2019-02-18 | Stop reason: SDUPTHER

## 2019-02-18 RX ORDER — POTASSIUM CHLORIDE 20 MEQ/1
20 TABLET, EXTENDED RELEASE ORAL DAILY
Status: DISCONTINUED | OUTPATIENT
Start: 2019-02-18 | End: 2019-02-19 | Stop reason: HOSPADM

## 2019-02-18 RX ORDER — GLYCOPYRROLATE 0.2 MG/ML
INJECTION INTRAMUSCULAR; INTRAVENOUS PRN
Status: DISCONTINUED | OUTPATIENT
Start: 2019-02-18 | End: 2019-02-18 | Stop reason: SDUPTHER

## 2019-02-18 RX ORDER — SODIUM CHLORIDE 0.9 % (FLUSH) 0.9 %
10 SYRINGE (ML) INJECTION PRN
Status: CANCELLED | OUTPATIENT
Start: 2019-02-18

## 2019-02-18 RX ORDER — HYDRALAZINE HYDROCHLORIDE 20 MG/ML
5 INJECTION INTRAMUSCULAR; INTRAVENOUS EVERY 10 MIN PRN
Status: DISCONTINUED | OUTPATIENT
Start: 2019-02-18 | End: 2019-02-18

## 2019-02-18 RX ORDER — FUROSEMIDE 10 MG/ML
INJECTION INTRAMUSCULAR; INTRAVENOUS PRN
Status: DISCONTINUED | OUTPATIENT
Start: 2019-02-18 | End: 2019-02-18 | Stop reason: SDUPTHER

## 2019-02-18 RX ORDER — VECURONIUM BROMIDE 1 MG/ML
INJECTION, POWDER, LYOPHILIZED, FOR SOLUTION INTRAVENOUS PRN
Status: DISCONTINUED | OUTPATIENT
Start: 2019-02-18 | End: 2019-02-18 | Stop reason: SDUPTHER

## 2019-02-18 RX ORDER — LIDOCAINE HYDROCHLORIDE 20 MG/ML
INJECTION, SOLUTION INFILTRATION; PERINEURAL PRN
Status: DISCONTINUED | OUTPATIENT
Start: 2019-02-18 | End: 2019-02-18 | Stop reason: SDUPTHER

## 2019-02-18 RX ORDER — AMITRIPTYLINE HYDROCHLORIDE 50 MG/1
25 TABLET, FILM COATED ORAL NIGHTLY PRN
Status: DISCONTINUED | OUTPATIENT
Start: 2019-02-18 | End: 2019-02-19 | Stop reason: HOSPADM

## 2019-02-18 RX ORDER — NEOSTIGMINE METHYLSULFATE 5 MG/5 ML
SYRINGE (ML) INTRAVENOUS PRN
Status: DISCONTINUED | OUTPATIENT
Start: 2019-02-18 | End: 2019-02-18 | Stop reason: SDUPTHER

## 2019-02-18 RX ORDER — FENTANYL CITRATE 50 UG/ML
INJECTION, SOLUTION INTRAMUSCULAR; INTRAVENOUS PRN
Status: DISCONTINUED | OUTPATIENT
Start: 2019-02-18 | End: 2019-02-18 | Stop reason: SDUPTHER

## 2019-02-18 RX ORDER — VASOPRESSIN 20 U/ML
INJECTION PARENTERAL PRN
Status: DISCONTINUED | OUTPATIENT
Start: 2019-02-18 | End: 2019-02-18 | Stop reason: SDUPTHER

## 2019-02-18 RX ORDER — LEVOTHYROXINE SODIUM 0.1 MG/1
100 TABLET ORAL
Status: DISCONTINUED | OUTPATIENT
Start: 2019-02-19 | End: 2019-02-19 | Stop reason: HOSPADM

## 2019-02-18 RX ORDER — LISINOPRIL 10 MG/1
10 TABLET ORAL DAILY
Status: DISCONTINUED | OUTPATIENT
Start: 2019-02-19 | End: 2019-02-19 | Stop reason: HOSPADM

## 2019-02-18 RX ORDER — METOPROLOL TARTRATE 50 MG/1
50 TABLET, FILM COATED ORAL DAILY
Status: DISCONTINUED | OUTPATIENT
Start: 2019-02-18 | End: 2019-02-19 | Stop reason: HOSPADM

## 2019-02-18 RX ORDER — SODIUM CHLORIDE 0.9 % (FLUSH) 0.9 %
10 SYRINGE (ML) INJECTION PRN
Status: DISCONTINUED | OUTPATIENT
Start: 2019-02-18 | End: 2019-02-19 | Stop reason: HOSPADM

## 2019-02-18 RX ORDER — ACETAMINOPHEN 325 MG/1
650 TABLET ORAL EVERY 4 HOURS PRN
Status: DISCONTINUED | OUTPATIENT
Start: 2019-02-18 | End: 2019-02-19 | Stop reason: HOSPADM

## 2019-02-18 RX ORDER — ACETAMINOPHEN 80 MG
TABLET,CHEWABLE ORAL
Status: DISPENSED
Start: 2019-02-18 | End: 2019-02-19

## 2019-02-18 RX ORDER — LIDOCAINE HYDROCHLORIDE 10 MG/ML
1 INJECTION, SOLUTION EPIDURAL; INFILTRATION; INTRACAUDAL; PERINEURAL
Status: CANCELLED | OUTPATIENT
Start: 2019-02-18 | End: 2019-02-18

## 2019-02-18 RX ORDER — ONDANSETRON 2 MG/ML
4 INJECTION INTRAMUSCULAR; INTRAVENOUS
Status: DISCONTINUED | OUTPATIENT
Start: 2019-02-18 | End: 2019-02-18

## 2019-02-18 RX ORDER — HYDROMORPHONE HCL 110MG/55ML
0.5 PATIENT CONTROLLED ANALGESIA SYRINGE INTRAVENOUS EVERY 5 MIN PRN
Status: DISCONTINUED | OUTPATIENT
Start: 2019-02-18 | End: 2019-02-18

## 2019-02-18 RX ORDER — LABETALOL HYDROCHLORIDE 5 MG/ML
5 INJECTION, SOLUTION INTRAVENOUS EVERY 10 MIN PRN
Status: DISCONTINUED | OUTPATIENT
Start: 2019-02-18 | End: 2019-02-18

## 2019-02-18 RX ORDER — HEPARIN SODIUM 1000 [USP'U]/ML
INJECTION, SOLUTION INTRAVENOUS; SUBCUTANEOUS PRN
Status: DISCONTINUED | OUTPATIENT
Start: 2019-02-18 | End: 2019-02-18 | Stop reason: SDUPTHER

## 2019-02-18 RX ORDER — PROPOFOL 10 MG/ML
INJECTION, EMULSION INTRAVENOUS PRN
Status: DISCONTINUED | OUTPATIENT
Start: 2019-02-18 | End: 2019-02-18 | Stop reason: SDUPTHER

## 2019-02-18 RX ORDER — SODIUM CHLORIDE 0.9 % (FLUSH) 0.9 %
10 SYRINGE (ML) INJECTION EVERY 12 HOURS SCHEDULED
Status: DISCONTINUED | OUTPATIENT
Start: 2019-02-18 | End: 2019-02-19 | Stop reason: HOSPADM

## 2019-02-18 RX ORDER — OXYCODONE HYDROCHLORIDE AND ACETAMINOPHEN 5; 325 MG/1; MG/1
1 TABLET ORAL
Status: COMPLETED | OUTPATIENT
Start: 2019-02-18 | End: 2019-02-18

## 2019-02-18 RX ADMIN — VECURONIUM BROMIDE 2 MG: 1 INJECTION, POWDER, LYOPHILIZED, FOR SOLUTION INTRAVENOUS at 09:07

## 2019-02-18 RX ADMIN — PROPOFOL 50 MG: 10 INJECTION, EMULSION INTRAVENOUS at 08:45

## 2019-02-18 RX ADMIN — LIDOCAINE HYDROCHLORIDE 80 MG: 20 INJECTION, SOLUTION INFILTRATION; PERINEURAL at 07:33

## 2019-02-18 RX ADMIN — SODIUM CHLORIDE: 9 INJECTION, SOLUTION INTRAVENOUS at 07:28

## 2019-02-18 RX ADMIN — VECURONIUM BROMIDE 7 MG: 1 INJECTION, POWDER, LYOPHILIZED, FOR SOLUTION INTRAVENOUS at 07:33

## 2019-02-18 RX ADMIN — Medication 10 ML: at 20:05

## 2019-02-18 RX ADMIN — PHENYLEPHRINE HYDROCHLORIDE 200 MCG: 10 INJECTION INTRAVENOUS at 07:44

## 2019-02-18 RX ADMIN — PHENYLEPHRINE HYDROCHLORIDE 100 MCG: 10 INJECTION INTRAVENOUS at 09:14

## 2019-02-18 RX ADMIN — PHENYLEPHRINE HYDROCHLORIDE 100 MCG: 10 INJECTION INTRAVENOUS at 09:11

## 2019-02-18 RX ADMIN — HEPARIN SODIUM 5000 UNITS: 1000 INJECTION, SOLUTION INTRAVENOUS; SUBCUTANEOUS at 09:13

## 2019-02-18 RX ADMIN — VECURONIUM BROMIDE 1 MG: 1 INJECTION, POWDER, LYOPHILIZED, FOR SOLUTION INTRAVENOUS at 11:04

## 2019-02-18 RX ADMIN — VASOPRESSIN 1 UNITS: 20 INJECTION INTRAVENOUS at 09:17

## 2019-02-18 RX ADMIN — ACETAMINOPHEN 650 MG: 325 TABLET, FILM COATED ORAL at 20:04

## 2019-02-18 RX ADMIN — PHENYLEPHRINE HYDROCHLORIDE 30 MCG/MIN: 10 INJECTION INTRAVENOUS at 08:04

## 2019-02-18 RX ADMIN — ONDANSETRON 4 MG: 2 INJECTION INTRAMUSCULAR; INTRAVENOUS at 07:40

## 2019-02-18 RX ADMIN — SODIUM CHLORIDE: 9 INJECTION, SOLUTION INTRAVENOUS at 07:44

## 2019-02-18 RX ADMIN — ETOMIDATE 6 MG: 20 INJECTION, SOLUTION INTRAVENOUS at 07:33

## 2019-02-18 RX ADMIN — FUROSEMIDE 10 MG: 10 INJECTION, SOLUTION INTRAMUSCULAR; INTRAVENOUS at 10:57

## 2019-02-18 RX ADMIN — HEPARIN SODIUM 12000 UNITS: 1000 INJECTION, SOLUTION INTRAVENOUS; SUBCUTANEOUS at 08:30

## 2019-02-18 RX ADMIN — VECURONIUM BROMIDE 3 MG: 1 INJECTION, POWDER, LYOPHILIZED, FOR SOLUTION INTRAVENOUS at 08:45

## 2019-02-18 RX ADMIN — VASOPRESSIN 1 UNITS: 20 INJECTION INTRAVENOUS at 07:47

## 2019-02-18 RX ADMIN — PHENYLEPHRINE HYDROCHLORIDE 100 MCG: 10 INJECTION INTRAVENOUS at 08:04

## 2019-02-18 RX ADMIN — HEPARIN SODIUM 4000 UNITS: 1000 INJECTION, SOLUTION INTRAVENOUS; SUBCUTANEOUS at 09:57

## 2019-02-18 RX ADMIN — PHENYLEPHRINE HYDROCHLORIDE 100 MCG: 10 INJECTION INTRAVENOUS at 08:09

## 2019-02-18 RX ADMIN — AMITRIPTYLINE HYDROCHLORIDE 25 MG: 50 TABLET, FILM COATED ORAL at 20:05

## 2019-02-18 RX ADMIN — FENTANYL CITRATE 50 MCG: 50 INJECTION, SOLUTION INTRAMUSCULAR; INTRAVENOUS at 07:40

## 2019-02-18 RX ADMIN — PHENYLEPHRINE HYDROCHLORIDE 100 MCG: 10 INJECTION INTRAVENOUS at 08:50

## 2019-02-18 RX ADMIN — PHENYLEPHRINE HYDROCHLORIDE 100 MCG: 10 INJECTION INTRAVENOUS at 10:47

## 2019-02-18 RX ADMIN — PROPOFOL 50 MG: 10 INJECTION, EMULSION INTRAVENOUS at 07:33

## 2019-02-18 RX ADMIN — FENTANYL CITRATE 50 MCG: 50 INJECTION, SOLUTION INTRAMUSCULAR; INTRAVENOUS at 07:33

## 2019-02-18 RX ADMIN — PHENYLEPHRINE HYDROCHLORIDE 100 MCG: 10 INJECTION INTRAVENOUS at 10:42

## 2019-02-18 RX ADMIN — VECURONIUM BROMIDE 1 MG: 1 INJECTION, POWDER, LYOPHILIZED, FOR SOLUTION INTRAVENOUS at 10:32

## 2019-02-18 RX ADMIN — DEXAMETHASONE SODIUM PHOSPHATE 4 MG: 4 INJECTION, SOLUTION INTRA-ARTICULAR; INTRALESIONAL; INTRAMUSCULAR; INTRAVENOUS; SOFT TISSUE at 07:40

## 2019-02-18 RX ADMIN — PHENYLEPHRINE HYDROCHLORIDE 100 MCG: 10 INJECTION INTRAVENOUS at 09:16

## 2019-02-18 RX ADMIN — PHENYLEPHRINE HYDROCHLORIDE 200 MCG: 10 INJECTION INTRAVENOUS at 11:09

## 2019-02-18 RX ADMIN — HEPARIN SODIUM 5000 UNITS: 1000 INJECTION, SOLUTION INTRAVENOUS; SUBCUTANEOUS at 09:35

## 2019-02-18 RX ADMIN — GLYCOPYRROLATE 0.6 MG: 0.2 INJECTION INTRAMUSCULAR; INTRAVENOUS at 11:50

## 2019-02-18 RX ADMIN — HEPARIN SODIUM 5000 UNITS: 1000 INJECTION, SOLUTION INTRAVENOUS; SUBCUTANEOUS at 08:49

## 2019-02-18 RX ADMIN — FUROSEMIDE 10 MG: 10 INJECTION, SOLUTION INTRAMUSCULAR; INTRAVENOUS at 10:18

## 2019-02-18 RX ADMIN — PROPOFOL 50 MG: 10 INJECTION, EMULSION INTRAVENOUS at 07:34

## 2019-02-18 RX ADMIN — Medication 4 MG: at 11:50

## 2019-02-18 RX ADMIN — OXYCODONE AND ACETAMINOPHEN 1 TABLET: 5; 325 TABLET ORAL at 15:20

## 2019-02-18 RX ADMIN — PHENYLEPHRINE HYDROCHLORIDE 100 MCG: 10 INJECTION INTRAVENOUS at 09:53

## 2019-02-18 ASSESSMENT — PULMONARY FUNCTION TESTS
PIF_VALUE: 22
PIF_VALUE: 22
PIF_VALUE: 25
PIF_VALUE: 23
PIF_VALUE: 23
PIF_VALUE: 24
PIF_VALUE: 24
PIF_VALUE: 22
PIF_VALUE: 15
PIF_VALUE: 28
PIF_VALUE: 22
PIF_VALUE: 26
PIF_VALUE: 25
PIF_VALUE: 22
PIF_VALUE: 22
PIF_VALUE: 23
PIF_VALUE: 23
PIF_VALUE: 22
PIF_VALUE: 22
PIF_VALUE: 23
PIF_VALUE: 23
PIF_VALUE: 25
PIF_VALUE: 22
PIF_VALUE: 22
PIF_VALUE: 23
PIF_VALUE: 22
PIF_VALUE: 25
PIF_VALUE: 24
PIF_VALUE: 23
PIF_VALUE: 23
PIF_VALUE: 26
PIF_VALUE: 23
PIF_VALUE: 23
PIF_VALUE: 15
PIF_VALUE: 23
PIF_VALUE: 22
PIF_VALUE: 23
PIF_VALUE: 26
PIF_VALUE: 27
PIF_VALUE: 22
PIF_VALUE: 25
PIF_VALUE: 23
PIF_VALUE: 24
PIF_VALUE: 24
PIF_VALUE: 23
PIF_VALUE: 24
PIF_VALUE: 23
PIF_VALUE: 24
PIF_VALUE: 24
PIF_VALUE: 23
PIF_VALUE: 23
PIF_VALUE: 24
PIF_VALUE: 24
PIF_VALUE: 22
PIF_VALUE: 22
PIF_VALUE: 25
PIF_VALUE: 23
PIF_VALUE: 24
PIF_VALUE: 25
PIF_VALUE: 23
PIF_VALUE: 22
PIF_VALUE: 23
PIF_VALUE: 24
PIF_VALUE: 23
PIF_VALUE: 25
PIF_VALUE: 22
PIF_VALUE: 36
PIF_VALUE: 24
PIF_VALUE: 23
PIF_VALUE: 23
PIF_VALUE: 25
PIF_VALUE: 23
PIF_VALUE: 24
PIF_VALUE: 22
PIF_VALUE: 22
PIF_VALUE: 24
PIF_VALUE: 24
PIF_VALUE: 23
PIF_VALUE: 21
PIF_VALUE: 23
PIF_VALUE: 24
PIF_VALUE: 23
PIF_VALUE: 25
PIF_VALUE: 23
PIF_VALUE: 22
PIF_VALUE: 23
PIF_VALUE: 22
PIF_VALUE: 22
PIF_VALUE: 25
PIF_VALUE: 24
PIF_VALUE: 22
PIF_VALUE: 23
PIF_VALUE: 23
PIF_VALUE: 22
PIF_VALUE: 23
PIF_VALUE: 25
PIF_VALUE: 23
PIF_VALUE: 25
PIF_VALUE: 23
PIF_VALUE: 24
PIF_VALUE: 23
PIF_VALUE: 22
PIF_VALUE: 25
PIF_VALUE: 24
PIF_VALUE: 23
PIF_VALUE: 26
PIF_VALUE: 24
PIF_VALUE: 26
PIF_VALUE: 26
PIF_VALUE: 22
PIF_VALUE: 24
PIF_VALUE: 25
PIF_VALUE: 22
PIF_VALUE: 24
PIF_VALUE: 21
PIF_VALUE: 24
PIF_VALUE: 25
PIF_VALUE: 23
PIF_VALUE: 23
PIF_VALUE: 24
PIF_VALUE: 22
PIF_VALUE: 24
PIF_VALUE: 3
PIF_VALUE: 22
PIF_VALUE: 23
PIF_VALUE: 8
PIF_VALUE: 23
PIF_VALUE: 23
PIF_VALUE: 24
PIF_VALUE: 24
PIF_VALUE: 25
PIF_VALUE: 24
PIF_VALUE: 26
PIF_VALUE: 23
PIF_VALUE: 23
PIF_VALUE: 22
PIF_VALUE: 23
PIF_VALUE: 26
PIF_VALUE: 23
PIF_VALUE: 22
PIF_VALUE: 23
PIF_VALUE: 24
PIF_VALUE: 25
PIF_VALUE: 23
PIF_VALUE: 24
PIF_VALUE: 23
PIF_VALUE: 22
PIF_VALUE: 23
PIF_VALUE: 22
PIF_VALUE: 23
PIF_VALUE: 22
PIF_VALUE: 25
PIF_VALUE: 2
PIF_VALUE: 22
PIF_VALUE: 24
PIF_VALUE: 23
PIF_VALUE: 23
PIF_VALUE: 22
PIF_VALUE: 24
PIF_VALUE: 19
PIF_VALUE: 26
PIF_VALUE: 23
PIF_VALUE: 23
PIF_VALUE: 26
PIF_VALUE: 25
PIF_VALUE: 5
PIF_VALUE: 25
PIF_VALUE: 23
PIF_VALUE: 23
PIF_VALUE: 24
PIF_VALUE: 1
PIF_VALUE: 25
PIF_VALUE: 24
PIF_VALUE: 18
PIF_VALUE: 24
PIF_VALUE: 23
PIF_VALUE: 24
PIF_VALUE: 23
PIF_VALUE: 24
PIF_VALUE: 23
PIF_VALUE: 24
PIF_VALUE: 23
PIF_VALUE: 26
PIF_VALUE: 25
PIF_VALUE: 24
PIF_VALUE: 25
PIF_VALUE: 24
PIF_VALUE: 27
PIF_VALUE: 22
PIF_VALUE: 2
PIF_VALUE: 23
PIF_VALUE: 23
PIF_VALUE: 24
PIF_VALUE: 22
PIF_VALUE: 24
PIF_VALUE: 29
PIF_VALUE: 1
PIF_VALUE: 25
PIF_VALUE: 24
PIF_VALUE: 23
PIF_VALUE: 24
PIF_VALUE: 19
PIF_VALUE: 24
PIF_VALUE: 23
PIF_VALUE: 24
PIF_VALUE: 24
PIF_VALUE: 22
PIF_VALUE: 24
PIF_VALUE: 23
PIF_VALUE: 24
PIF_VALUE: 23
PIF_VALUE: 22
PIF_VALUE: 23
PIF_VALUE: 23
PIF_VALUE: 25
PIF_VALUE: 24
PIF_VALUE: 23
PIF_VALUE: 16
PIF_VALUE: 24
PIF_VALUE: 22
PIF_VALUE: 25
PIF_VALUE: 23
PIF_VALUE: 22
PIF_VALUE: 22
PIF_VALUE: 25
PIF_VALUE: 29
PIF_VALUE: 24
PIF_VALUE: 23
PIF_VALUE: 22
PIF_VALUE: 23
PIF_VALUE: 23
PIF_VALUE: 24
PIF_VALUE: 23
PIF_VALUE: 22
PIF_VALUE: 26
PIF_VALUE: 23
PIF_VALUE: 24
PIF_VALUE: 24
PIF_VALUE: 26
PIF_VALUE: 23
PIF_VALUE: 24

## 2019-02-18 ASSESSMENT — PAIN SCALES - GENERAL
PAINLEVEL_OUTOF10: 6
PAINLEVEL_OUTOF10: 5
PAINLEVEL_OUTOF10: 5
PAINLEVEL_OUTOF10: 0
PAINLEVEL_OUTOF10: 0

## 2019-02-18 ASSESSMENT — ENCOUNTER SYMPTOMS: SHORTNESS OF BREATH: 1

## 2019-02-18 ASSESSMENT — PAIN DESCRIPTION - LOCATION
LOCATION: BACK
LOCATION: BACK

## 2019-02-19 VITALS
WEIGHT: 244.93 LBS | HEIGHT: 67 IN | DIASTOLIC BLOOD PRESSURE: 56 MMHG | TEMPERATURE: 97.9 F | HEART RATE: 79 BPM | RESPIRATION RATE: 16 BRPM | OXYGEN SATURATION: 97 % | SYSTOLIC BLOOD PRESSURE: 111 MMHG | BODY MASS INDEX: 38.44 KG/M2

## 2019-02-19 PROCEDURE — 99217 PR OBSERVATION CARE DISCHARGE MANAGEMENT: CPT | Performed by: INTERNAL MEDICINE

## 2019-02-19 PROCEDURE — 6370000000 HC RX 637 (ALT 250 FOR IP): Performed by: INTERNAL MEDICINE

## 2019-02-19 PROCEDURE — 2580000003 HC RX 258: Performed by: INTERNAL MEDICINE

## 2019-02-19 RX ORDER — PANTOPRAZOLE SODIUM 40 MG/1
40 TABLET, DELAYED RELEASE ORAL
Qty: 30 TABLET | Refills: 0 | Status: SHIPPED | OUTPATIENT
Start: 2019-02-19 | End: 2019-12-09 | Stop reason: SDUPTHER

## 2019-02-19 RX ADMIN — Medication 10 ML: at 10:02

## 2019-02-19 RX ADMIN — LEVOTHYROXINE SODIUM 100 MCG: 100 TABLET ORAL at 06:57

## 2019-02-19 RX ADMIN — SERTRALINE 50 MG: 50 TABLET, FILM COATED ORAL at 10:01

## 2019-02-19 RX ADMIN — APIXABAN 5 MG: 5 TABLET, FILM COATED ORAL at 10:00

## 2019-02-19 ASSESSMENT — PAIN DESCRIPTION - PAIN TYPE: TYPE: SURGICAL PAIN

## 2019-02-19 ASSESSMENT — PAIN DESCRIPTION - ORIENTATION: ORIENTATION: ANTERIOR

## 2019-02-19 ASSESSMENT — PAIN DESCRIPTION - LOCATION: LOCATION: CHEST

## 2019-02-19 ASSESSMENT — PAIN DESCRIPTION - DESCRIPTORS: DESCRIPTORS: ACHING

## 2019-02-19 ASSESSMENT — PAIN SCALES - GENERAL: PAINLEVEL_OUTOF10: 3

## 2019-02-20 ENCOUNTER — TELEPHONE (OUTPATIENT)
Dept: PHARMACY | Facility: CLINIC | Age: 74
End: 2019-02-20

## 2019-02-21 ENCOUNTER — TELEPHONE (OUTPATIENT)
Dept: RHEUMATOLOGY | Age: 74
End: 2019-02-21

## 2019-03-08 ENCOUNTER — OFFICE VISIT (OUTPATIENT)
Dept: CARDIOLOGY CLINIC | Age: 74
End: 2019-03-08
Payer: MEDICARE

## 2019-03-08 VITALS
WEIGHT: 241.6 LBS | BODY MASS INDEX: 37.92 KG/M2 | SYSTOLIC BLOOD PRESSURE: 100 MMHG | HEIGHT: 67 IN | DIASTOLIC BLOOD PRESSURE: 60 MMHG | RESPIRATION RATE: 16 BRPM | HEART RATE: 66 BPM | OXYGEN SATURATION: 96 %

## 2019-03-08 DIAGNOSIS — R06.02 SOB (SHORTNESS OF BREATH): ICD-10-CM

## 2019-03-08 DIAGNOSIS — I48.91 ATRIAL FIBRILLATION, UNSPECIFIED TYPE (HCC): ICD-10-CM

## 2019-03-08 DIAGNOSIS — E78.00 HYPERCHOLESTEROLEMIA: ICD-10-CM

## 2019-03-08 DIAGNOSIS — E66.9 OBESITY (BMI 30-39.9): ICD-10-CM

## 2019-03-08 DIAGNOSIS — I10 BENIGN ESSENTIAL HTN: Chronic | ICD-10-CM

## 2019-03-08 DIAGNOSIS — I50.42 CHRONIC COMBINED SYSTOLIC (CONGESTIVE) AND DIASTOLIC (CONGESTIVE) HEART FAILURE (HCC): Primary | Chronic | ICD-10-CM

## 2019-03-08 DIAGNOSIS — E55.9 VITAMIN D DEFICIENCY: ICD-10-CM

## 2019-03-08 PROCEDURE — 99214 OFFICE O/P EST MOD 30 MIN: CPT | Performed by: INTERNAL MEDICINE

## 2019-04-03 NOTE — PROGRESS NOTES
[address]   Electrophysiology Follow Up  Date: 4/4/2019    Chief Complaint   Patient presents with    Atrial Fibrillation        HPI: Yobani Fragoso is a 75 y.o. seen post DC cardioversion 9/14/18 for recurrent atrial fibrillation post cardioversion 2/2018, hx multiple cardioversion dating back to 2011 and MAZE procedure seven years ago. Denies recurrence of atrial fib. She has been checking her pulse daily and no recurrence of Atrial fibrillation since cardioversion  Has been off xarelto due to GI side effects. Had DASHAWN closure, confirmed by KAROLINA  Has had not done sleep study  Patient seen previously by Dr. Baldev Francisco. Admitted with headache for 1-2 days in 3/2018. Also feeling Atrial fibrillation for several days and just recently started Xarelto  She has Hx of maze and DASHAWN closure in 2011. In her last office visit she was not interested in cardioversion or ablation. Today she presents for a routine f/u s/p ablation on 2/18/19. She denies any recurrent episodes of atrial fibrillation. Patient denies lightheadedness, dizziness, chest pain, palpitations, orthopnea, edema, presyncope or syncope. Past Medical History:   Diagnosis Date    Arthritis     Asthma     Atrial fibrillation ([address] 8/9/2011    Depression     Diverticulitis     DVT     post partum    GERD (gastroesophageal reflux disease)     Hx of blood clots     leg    Hypertension     Insomnia     Other disorders of kidney and ureter     hypothyroid    Pneumonia 11/7/2011    Shingles     Thyroid disease         Past Surgical History:   Procedure Laterality Date    CARDIAC SURGERY  2012    minimaze    CARDIOVERSION  09/2018    CHOLECYSTECTOMY, LAPAROSCOPIC  03/10/2017    with gram        Allergies: Allergies   Allergen Reactions    Benadryl [Diphenhydramine]      Accelerated heart rate       Social History:   reports that she has never smoked. She has never used smokeless tobacco. She reports that she drinks alcohol. She reports that she does not use drugs. Family History:  family history includes Arthritis in her brother; Cancer in her brother; Depression in her brother; Diabetes in her brother; High Blood Pressure in her brother and brother; Lupus in her sister and sister. Reviewed. Denies family history of sudden cardiac death, arrhythmia, premature CAD    Review of System:  All other systems reviewed and are negative except for that noted above. Pertinent negatives are:   General: negative for fever, chills   Ophthalmic ROS: negative for - eye pain or loss of vision  ENT ROS: negative for - headaches, sore throat   Respiratory: negative for - cough, sputum  Cardiovascular: Reviewed in HPI  Gastrointestinal: negative for - abdominal pain, diarrhea, N/V  Hematology: negative for - bleeding, blood clots, bruising or jaundice  Genito-Urinary:  negative for - Dysuria or incontinence  Musculoskeletal: negative for - Joint swelling, muscle pain  Neurological: negative for - confusion, dizziness, headaches   Psychiatric: No anxiety, no depression. Dermatological: negative for - rash    Physical Examination:  Vitals:    04/04/19 0804   BP: 107/70   Pulse: 78      Wt Readings from Last 3 Encounters:   04/04/19 243 lb 1.9 oz (110.3 kg)   03/08/19 241 lb 9.6 oz (109.6 kg)   02/19/19 244 lb 14.9 oz (111.1 kg)       · Constitutional: Oriented. No distress. · Head: Normocephalic and atraumatic. · Mouth/Throat: Oropharynx is clear and moist.   · Eyes: Conjunctivae normal. EOM are normal.   · Neck: Neck supple. No rigidity. No JVD present. · Cardiovascular: Normal rate, regular rhythm, S1&S2. · Pulmonary/Chest: Bilateral respiratory sounds. No wheezes, No rhonchi. · Abdominal: Soft. Bowel sounds present. No distension, No tenderness. · Musculoskeletal: No tenderness. No edema    · Lymphadenopathy: Has no cervical adenopathy. · Neurological: Alert and oriented.  Cranial nerve appears intact, No Gross deficit   · Skin:

## 2019-04-04 ENCOUNTER — OFFICE VISIT (OUTPATIENT)
Dept: CARDIOLOGY CLINIC | Age: 74
End: 2019-04-04
Payer: MEDICARE

## 2019-04-04 VITALS
HEART RATE: 78 BPM | HEIGHT: 67 IN | DIASTOLIC BLOOD PRESSURE: 70 MMHG | BODY MASS INDEX: 38.16 KG/M2 | SYSTOLIC BLOOD PRESSURE: 107 MMHG | WEIGHT: 243.12 LBS

## 2019-04-04 DIAGNOSIS — I42.0 DILATED CARDIOMYOPATHY (HCC): ICD-10-CM

## 2019-04-04 DIAGNOSIS — E03.9 ACQUIRED HYPOTHYROIDISM: ICD-10-CM

## 2019-04-04 DIAGNOSIS — I10 BENIGN ESSENTIAL HTN: ICD-10-CM

## 2019-04-04 DIAGNOSIS — I50.22 CHRONIC SYSTOLIC HEART FAILURE (HCC): ICD-10-CM

## 2019-04-04 DIAGNOSIS — I48.0 PAF (PAROXYSMAL ATRIAL FIBRILLATION) (HCC): Primary | ICD-10-CM

## 2019-04-04 PROCEDURE — 99214 OFFICE O/P EST MOD 30 MIN: CPT | Performed by: INTERNAL MEDICINE

## 2019-04-04 PROCEDURE — 93000 ELECTROCARDIOGRAM COMPLETE: CPT | Performed by: INTERNAL MEDICINE

## 2019-04-12 RX ORDER — PANTOPRAZOLE SODIUM 40 MG/1
40 TABLET, DELAYED RELEASE ORAL
Qty: 90 TABLET | Refills: 0 | OUTPATIENT
Start: 2019-04-12

## 2019-04-18 ENCOUNTER — HOSPITAL ENCOUNTER (OUTPATIENT)
Age: 74
Discharge: HOME | End: 2019-04-18
Payer: MEDICARE

## 2019-04-18 DIAGNOSIS — I10 BENIGN ESSENTIAL HTN: Chronic | ICD-10-CM

## 2019-04-18 DIAGNOSIS — R06.02 SOB (SHORTNESS OF BREATH): ICD-10-CM

## 2019-04-18 DIAGNOSIS — E78.00 HYPERCHOLESTEROLEMIA: ICD-10-CM

## 2019-04-18 DIAGNOSIS — I50.42 CHRONIC COMBINED SYSTOLIC (CONGESTIVE) AND DIASTOLIC (CONGESTIVE) HEART FAILURE (HCC): Chronic | ICD-10-CM

## 2019-04-18 DIAGNOSIS — E55.9 VITAMIN D DEFICIENCY: ICD-10-CM

## 2019-04-18 LAB
A/G RATIO: 1.3 (ref 1.1–2.2)
ALBUMIN SERPL-MCNC: 4.1 G/DL (ref 3.4–5)
ALP BLD-CCNC: 87 U/L (ref 40–129)
ALT SERPL-CCNC: 28 U/L (ref 10–40)
ANION GAP SERPL CALCULATED.3IONS-SCNC: 9 MMOL/L (ref 3–16)
AST SERPL-CCNC: 31 U/L (ref 15–37)
BILIRUB SERPL-MCNC: 0.4 MG/DL (ref 0–1)
BUN BLDV-MCNC: 17 MG/DL (ref 7–20)
CALCIUM SERPL-MCNC: 10 MG/DL (ref 8.3–10.6)
CHLORIDE BLD-SCNC: 103 MMOL/L (ref 99–110)
CHOLESTEROL, FASTING: 119 MG/DL (ref 0–199)
CO2: 30 MMOL/L (ref 21–32)
CREAT SERPL-MCNC: 1.4 MG/DL (ref 0.6–1.2)
GFR AFRICAN AMERICAN: 44
GFR NON-AFRICAN AMERICAN: 37
GLOBULIN: 3.1 G/DL
GLUCOSE BLD-MCNC: 115 MG/DL (ref 70–99)
HCT VFR BLD CALC: 44.2 % (ref 36–48)
HDLC SERPL-MCNC: 40 MG/DL (ref 40–60)
HEMOGLOBIN: 14.6 G/DL (ref 12–16)
LDL CHOLESTEROL CALCULATED: 52 MG/DL
MCH RBC QN AUTO: 28.9 PG (ref 26–34)
MCHC RBC AUTO-ENTMCNC: 33 G/DL (ref 31–36)
MCV RBC AUTO: 87.4 FL (ref 80–100)
PDW BLD-RTO: 14.3 % (ref 12.4–15.4)
PLATELET # BLD: 247 K/UL (ref 135–450)
PMV BLD AUTO: 8.7 FL (ref 5–10.5)
POTASSIUM SERPL-SCNC: 5.6 MMOL/L (ref 3.5–5.1)
PRO-BNP: 492 PG/ML (ref 0–449)
RBC # BLD: 5.06 M/UL (ref 4–5.2)
SODIUM BLD-SCNC: 142 MMOL/L (ref 136–145)
TOTAL PROTEIN: 7.2 G/DL (ref 6.4–8.2)
TRIGLYCERIDE, FASTING: 133 MG/DL (ref 0–150)
VITAMIN D 25-HYDROXY: 31.2 NG/ML
VLDLC SERPL CALC-MCNC: 27 MG/DL
WBC # BLD: 6 K/UL (ref 4–11)

## 2019-04-18 PROCEDURE — 82306 VITAMIN D 25 HYDROXY: CPT

## 2019-04-18 PROCEDURE — 85027 COMPLETE CBC AUTOMATED: CPT

## 2019-04-18 PROCEDURE — 80053 COMPREHEN METABOLIC PANEL: CPT

## 2019-04-18 PROCEDURE — 36415 COLL VENOUS BLD VENIPUNCTURE: CPT

## 2019-04-18 PROCEDURE — 83880 ASSAY OF NATRIURETIC PEPTIDE: CPT

## 2019-04-18 PROCEDURE — 80061 LIPID PANEL: CPT

## 2019-04-19 ENCOUNTER — TELEPHONE (OUTPATIENT)
Dept: CARDIOLOGY CLINIC | Age: 74
End: 2019-04-19

## 2019-04-19 NOTE — TELEPHONE ENCOUNTER
Call placed to patient no vm or answer machine also called son [address]. the \"same\" no answer will forward to the pool for further contact until patient is reach to informed her of message below.

## 2019-04-19 NOTE — TELEPHONE ENCOUNTER
----- Message from Vira Dobbins sent at 4/19/2019  9:50 AM EDT -----      ----- Message -----  From: CARLIE Rai CNP  Sent: 4/18/2019   4:23 PM  To: [facility]    Covering for Dr. Salima Magdaleno. Heart number is better. Vitamin D level is normal. Cholesterol panel is normal. Potassium level is elevated and recommend stopping potassium supplement for now. Repeat BMP in 2 weeks to monitor potassium.

## 2019-04-23 NOTE — TELEPHONE ENCOUNTER
Call placed to patient and son Hemal Love who was not available to speak with. I could not leave a message. No vm came on for either telephone number. Will try back once again later today or tomorrow.

## 2019-04-24 ENCOUNTER — TELEPHONE (OUTPATIENT)
Dept: CARDIOLOGY CLINIC | Age: 74
End: 2019-04-24

## 2019-04-24 DIAGNOSIS — I50.22 CHRONIC SYSTOLIC HEART FAILURE (HCC): ICD-10-CM

## 2019-04-24 DIAGNOSIS — E87.5 HYPERKALEMIA: Primary | ICD-10-CM

## 2019-05-07 ENCOUNTER — HOSPITAL ENCOUNTER (OUTPATIENT)
Age: 74
Discharge: HOME | End: 2019-05-07
Payer: MEDICARE

## 2019-05-07 DIAGNOSIS — R06.02 SOB (SHORTNESS OF BREATH): ICD-10-CM

## 2019-05-07 DIAGNOSIS — E66.9 OBESITY (BMI 30-39.9): ICD-10-CM

## 2019-05-07 DIAGNOSIS — I10 ESSENTIAL HYPERTENSION: ICD-10-CM

## 2019-05-07 DIAGNOSIS — I50.42 CHRONIC COMBINED SYSTOLIC (CONGESTIVE) AND DIASTOLIC (CONGESTIVE) HEART FAILURE (HCC): Chronic | ICD-10-CM

## 2019-05-07 DIAGNOSIS — I49.9 IRREGULAR HEART RATE: ICD-10-CM

## 2019-05-07 DIAGNOSIS — I48.91 RAPID ATRIAL FIBRILLATION (HCC): ICD-10-CM

## 2019-05-07 DIAGNOSIS — I48.91 ATRIAL FIBRILLATION, UNSPECIFIED TYPE (HCC): ICD-10-CM

## 2019-05-07 LAB
ANION GAP SERPL CALCULATED.3IONS-SCNC: 12 MMOL/L (ref 3–16)
BUN BLDV-MCNC: 22 MG/DL (ref 7–20)
CALCIUM SERPL-MCNC: 9.7 MG/DL (ref 8.3–10.6)
CHLORIDE BLD-SCNC: 101 MMOL/L (ref 99–110)
CO2: 29 MMOL/L (ref 21–32)
CREAT SERPL-MCNC: 1.5 MG/DL (ref 0.6–1.2)
GFR AFRICAN AMERICAN: 41
GFR NON-AFRICAN AMERICAN: 34
GLUCOSE BLD-MCNC: 113 MG/DL (ref 70–99)
POTASSIUM SERPL-SCNC: 4.9 MMOL/L (ref 3.5–5.1)
PRO-BNP: 356 PG/ML (ref 0–449)
SODIUM BLD-SCNC: 142 MMOL/L (ref 136–145)

## 2019-05-07 PROCEDURE — 83880 ASSAY OF NATRIURETIC PEPTIDE: CPT

## 2019-05-07 PROCEDURE — 80048 BASIC METABOLIC PNL TOTAL CA: CPT

## 2019-05-07 PROCEDURE — 36415 COLL VENOUS BLD VENIPUNCTURE: CPT

## 2019-05-15 DIAGNOSIS — F32.2 SEVERE SINGLE CURRENT EPISODE OF MAJOR DEPRESSIVE DISORDER, WITHOUT PSYCHOTIC FEATURES (HCC): Chronic | ICD-10-CM

## 2019-05-15 RX ORDER — AMITRIPTYLINE HYDROCHLORIDE 25 MG/1
25 TABLET, FILM COATED ORAL NIGHTLY
Qty: 90 TABLET | Refills: 0 | Status: SHIPPED | OUTPATIENT
Start: 2019-05-15 | End: 2019-06-13 | Stop reason: ALTCHOICE

## 2019-06-13 ENCOUNTER — OFFICE VISIT (OUTPATIENT)
Dept: INTERNAL MEDICINE CLINIC | Age: 74
End: 2019-06-13
Payer: MEDICARE

## 2019-06-13 VITALS
HEART RATE: 80 BPM | DIASTOLIC BLOOD PRESSURE: 72 MMHG | SYSTOLIC BLOOD PRESSURE: 106 MMHG | BODY MASS INDEX: 38.92 KG/M2 | HEIGHT: 67 IN | WEIGHT: 248 LBS

## 2019-06-13 DIAGNOSIS — I10 ESSENTIAL HYPERTENSION: ICD-10-CM

## 2019-06-13 DIAGNOSIS — M79.7 FIBROMYALGIA: ICD-10-CM

## 2019-06-13 DIAGNOSIS — F32.2 SEVERE SINGLE CURRENT EPISODE OF MAJOR DEPRESSIVE DISORDER, WITHOUT PSYCHOTIC FEATURES (HCC): Chronic | ICD-10-CM

## 2019-06-13 DIAGNOSIS — R53.82 CHRONIC FATIGUE: Primary | ICD-10-CM

## 2019-06-13 DIAGNOSIS — E03.9 ACQUIRED HYPOTHYROIDISM: ICD-10-CM

## 2019-06-13 DIAGNOSIS — F41.9 CHRONIC ANXIETY: ICD-10-CM

## 2019-06-13 DIAGNOSIS — I48.91 RAPID ATRIAL FIBRILLATION (HCC): ICD-10-CM

## 2019-06-13 PROCEDURE — 99214 OFFICE O/P EST MOD 30 MIN: CPT | Performed by: NURSE PRACTITIONER

## 2019-06-13 RX ORDER — CLONAZEPAM 1 MG/1
1 TABLET ORAL NIGHTLY PRN
Qty: 30 TABLET | Refills: 0 | Status: SHIPPED | OUTPATIENT
Start: 2019-06-13 | End: 2019-12-09 | Stop reason: SDUPTHER

## 2019-06-13 RX ORDER — DULOXETIN HYDROCHLORIDE 30 MG/1
30 CAPSULE, DELAYED RELEASE ORAL DAILY
Qty: 30 CAPSULE | Refills: 3 | Status: SHIPPED | OUTPATIENT
Start: 2019-06-13 | End: 2019-12-09 | Stop reason: ALTCHOICE

## 2019-06-13 RX ORDER — CLONAZEPAM 1 MG/1
1 TABLET ORAL NIGHTLY PRN
Qty: 30 TABLET | Refills: 0 | Status: CANCELLED | OUTPATIENT
Start: 2019-06-13 | End: 2019-09-11

## 2019-06-13 RX ORDER — LEVOTHYROXINE SODIUM 112 UG/1
112 TABLET ORAL DAILY
Qty: 30 TABLET | Refills: 0 | Status: SHIPPED | OUTPATIENT
Start: 2019-06-13 | End: 2019-07-22 | Stop reason: SDUPTHER

## 2019-06-13 RX ORDER — LEVOTHYROXINE SODIUM 112 UG/1
112 TABLET ORAL DAILY
Qty: 90 TABLET | Refills: 1 | Status: SHIPPED | OUTPATIENT
Start: 2019-06-13 | End: 2019-07-22 | Stop reason: SDUPTHER

## 2019-06-14 ASSESSMENT — ENCOUNTER SYMPTOMS
GASTROINTESTINAL NEGATIVE: 1
SHORTNESS OF BREATH: 1

## 2019-06-14 NOTE — PROGRESS NOTES
SUBJECTIVE:    Patient ID: Citlali Kennedy is a 75 y.o. female. CC: Atrial fibrillation/tachycardia, chronic pain, neuropathy, hypertension, hypothyroidism, anxiety    HPI: The patient presents to the office today for follow-up of chronic medical conditions. She also has concerns today. She has had multiple ablations for atrial fibrillation. She reports she is doing okay with this but complains of feeling tired and shortness of breath since. She has multiple family members who have been diagnosed with fibromyalgia. She thinks that she has similar symptoms. Her family members have had success with Cymbalta and helping both mood and musculoskeletal aches and pains. She is interested in trying Cymbalta to see if this will help with her neuropathy and chronic pains. Hypertension: She denies any chest pain or shortness of breath.     Hypothyroidism: TSH was normal.  She reports compliance with Synthroid     Anxiety: She has a history of chronic anxiety. She uses sertraline but is interested in switching to Cymbalta. She also uses Klonopin as needed and is aware of the risk of this medication. Past Medical History:   Diagnosis Date    Arthritis     Asthma     Atrial fibrillation ([facility] [address] 8/9/2011    Depression     Diverticulitis     DVT     post partum    GERD (gastroesophageal reflux disease)     Hx of blood clots     leg    Hypertension     Insomnia     Other disorders of kidney and ureter     hypothyroid    Pneumonia 11/7/2011    Shingles     Thyroid disease         Current Outpatient Medications   Medication Sig Dispense Refill    levothyroxine (SYNTHROID) 112 MCG tablet Take 1 tablet by mouth Daily 90 tablet 1    levothyroxine (SYNTHROID) 112 MCG tablet Take 1 tablet by mouth daily 30 tablet 0    clonazePAM (KLONOPIN) 1 MG tablet Take 1 tablet by mouth nightly as needed for Anxiety for up to 90 days.  30 tablet 0    DULoxetine (CYMBALTA) 30 MG extended release capsule Take 1 capsule by mouth daily 30 capsule 3    pantoprazole (PROTONIX) 40 MG tablet Take 1 tablet by mouth every morning (before breakfast) 30 tablet 0    apixaban (ELIQUIS) 5 MG TABS tablet Take 5 mg by mouth 2 times daily      furosemide (LASIX) 40 MG tablet Take 1 tab daily 90 tablet 3    metoprolol tartrate (LOPRESSOR) 50 MG tablet Take 1 tablet by mouth daily (Patient taking differently: Take 100 mg by mouth 2 times daily ) 90 tablet 3    lisinopril (PRINIVIL;ZESTRIL) 10 MG tablet Take 1 tablet by mouth daily 90 tablet 3     No current facility-administered medications for this visit. Review of Systems   Constitutional: Positive for fatigue. Respiratory: Positive for shortness of breath. Cardiovascular: Negative. Negative for chest pain. Gastrointestinal: Negative. Genitourinary: Negative. Musculoskeletal: Positive for myalgias. Psychiatric/Behavioral: Negative. All other systems reviewed and are negative. OBJECTIVE:  Physical Exam   Constitutional: She is oriented to person, place, and time. She appears well-developed and well-nourished. No distress. HENT:   Head: Normocephalic and atraumatic. Eyes: Conjunctivae are normal. No scleral icterus. Neck: Neck supple. No JVD present. Cardiovascular: An irregularly irregular rhythm present. Pulmonary/Chest: Effort normal and breath sounds normal. No respiratory distress. She has no wheezes. Abdominal: Soft. She exhibits no distension. There is no tenderness. There is no rebound and no guarding. Musculoskeletal: Normal range of motion. She exhibits no edema. Neurological: She is alert and oriented to person, place, and time. Skin: Skin is warm and dry. She is not diaphoretic. Psychiatric: She has a normal mood and affect.  Her behavior is normal. Thought content normal.      /72   Pulse 80   Ht 5' 7\" (1.702 m)   Wt 248 lb (112.5 kg)   BMI 38.84 kg/m²      PHQ Scores 5/24/2017 4/16/2014   PHQ2 Score 0 0 PHQ9 Score 0 0     Interpretation of Total Score Depression Severity: 1-4 = Minimal depression, 5-9 = Mild depression, 10-14 = Moderate depression, 15-19 = Moderately severe depression, 20-27 =Severe depression        ASSESSMENT/PLAN:  Rosi Nowak was seen today for follow-up and discuss medications. Diagnoses and all orders for this visit:    Chronic fatigue  -Possibly related to her cardiac and other medical problems  -She is concerned about fibromyalgia and meets criteria. This could also be playing a role  -Change to Cymbalta and see if this helps    Fibromyalgia  - As above  -     DULoxetine (CYMBALTA) 30 MG extended release capsule; Take 1 capsule by mouth daily    Severe single current episode of major depressive disorder, without psychotic features ([address]  - Discontinue SSRI and start Cymbalta per her request  -     DULoxetine (CYMBALTA) 30 MG extended release capsule; Take 1 capsule by mouth daily    Rapid atrial fibrillation ([address]  -Seen by cardiology and has had an ablation  -Continue plan per cardiology    Essential hypertension  - Normotensive  - she has met JNC standards.  - Continue current regimen. Acquired hypothyroidism  -     levothyroxine (SYNTHROID) 112 MCG tablet; Take 1 tablet by mouth Daily  -     levothyroxine (SYNTHROID) 112 MCG tablet; Take 1 tablet by mouth daily    Chronic anxiety  - We have discussed the risks of Klonopin. She does not use this very often. Her last fill was months ago. She is aware I am not planning on increasing this medication due to the risks of benzodiazepine use  -     clonazePAM (KLONOPIN) 1 MG tablet; Take 1 tablet by mouth nightly as needed for Anxiety for up to 90 days. -     DULoxetine (CYMBALTA) 30 MG extended release capsule;  Take 1 capsule by mouth daily      CARLIE Suero - CNP

## 2019-07-18 ENCOUNTER — TELEPHONE (OUTPATIENT)
Dept: CARDIOLOGY CLINIC | Age: 74
End: 2019-07-18

## 2019-07-22 DIAGNOSIS — E03.9 ACQUIRED HYPOTHYROIDISM: ICD-10-CM

## 2019-07-22 RX ORDER — LEVOTHYROXINE SODIUM 112 UG/1
112 TABLET ORAL DAILY
Qty: 90 TABLET | Refills: 1 | Status: SHIPPED | OUTPATIENT
Start: 2019-07-22 | End: 2019-12-09 | Stop reason: SDUPTHER

## 2019-07-25 ENCOUNTER — OFFICE VISIT (OUTPATIENT)
Dept: INTERNAL MEDICINE CLINIC | Age: 74
End: 2019-07-25
Payer: MEDICARE

## 2019-07-25 VITALS
HEART RATE: 80 BPM | SYSTOLIC BLOOD PRESSURE: 104 MMHG | WEIGHT: 250 LBS | HEIGHT: 67 IN | BODY MASS INDEX: 39.24 KG/M2 | DIASTOLIC BLOOD PRESSURE: 72 MMHG

## 2019-07-25 DIAGNOSIS — I10 ESSENTIAL HYPERTENSION: ICD-10-CM

## 2019-07-25 DIAGNOSIS — I48.91 RAPID ATRIAL FIBRILLATION (HCC): ICD-10-CM

## 2019-07-25 DIAGNOSIS — B34.9 VIRAL ILLNESS: Primary | ICD-10-CM

## 2019-07-25 DIAGNOSIS — E03.9 ACQUIRED HYPOTHYROIDISM: ICD-10-CM

## 2019-07-25 DIAGNOSIS — R53.83 FATIGUE, UNSPECIFIED TYPE: ICD-10-CM

## 2019-07-25 DIAGNOSIS — M79.7 FIBROMYALGIA: ICD-10-CM

## 2019-07-25 DIAGNOSIS — I50.42 CHRONIC COMBINED SYSTOLIC (CONGESTIVE) AND DIASTOLIC (CONGESTIVE) HEART FAILURE (HCC): ICD-10-CM

## 2019-07-25 DIAGNOSIS — R06.02 SOB (SHORTNESS OF BREATH): ICD-10-CM

## 2019-07-25 LAB
A/G RATIO: 2.1 (ref 1.1–2.2)
ALBUMIN SERPL-MCNC: 4.7 G/DL (ref 3.4–5)
ALP BLD-CCNC: 83 U/L (ref 40–129)
ALT SERPL-CCNC: 43 U/L (ref 10–40)
ANION GAP SERPL CALCULATED.3IONS-SCNC: 15 MMOL/L (ref 3–16)
AST SERPL-CCNC: 57 U/L (ref 15–37)
BASOPHILS ABSOLUTE: 0.1 K/UL (ref 0–0.2)
BASOPHILS RELATIVE PERCENT: 0.9 %
BILIRUB SERPL-MCNC: 0.5 MG/DL (ref 0–1)
BUN BLDV-MCNC: 19 MG/DL (ref 7–20)
CALCIUM SERPL-MCNC: 10.3 MG/DL (ref 8.3–10.6)
CHLORIDE BLD-SCNC: 101 MMOL/L (ref 99–110)
CO2: 24 MMOL/L (ref 21–32)
CREAT SERPL-MCNC: 1.1 MG/DL (ref 0.6–1.2)
EOSINOPHILS ABSOLUTE: 0.3 K/UL (ref 0–0.6)
EOSINOPHILS RELATIVE PERCENT: 4.6 %
GFR AFRICAN AMERICAN: 59
GFR NON-AFRICAN AMERICAN: 48
GLOBULIN: 2.2 G/DL
GLUCOSE BLD-MCNC: 117 MG/DL (ref 70–99)
HCT VFR BLD CALC: 45.1 % (ref 36–48)
HEMOGLOBIN: 15.2 G/DL (ref 12–16)
LYMPHOCYTES ABSOLUTE: 1.3 K/UL (ref 1–5.1)
LYMPHOCYTES RELATIVE PERCENT: 17.5 %
MCH RBC QN AUTO: 29.9 PG (ref 26–34)
MCHC RBC AUTO-ENTMCNC: 33.6 G/DL (ref 31–36)
MCV RBC AUTO: 88.8 FL (ref 80–100)
MONOCYTES ABSOLUTE: 0.7 K/UL (ref 0–1.3)
MONOCYTES RELATIVE PERCENT: 9.6 %
NEUTROPHILS ABSOLUTE: 5.1 K/UL (ref 1.7–7.7)
NEUTROPHILS RELATIVE PERCENT: 67.4 %
PDW BLD-RTO: 14.3 % (ref 12.4–15.4)
PLATELET # BLD: 270 K/UL (ref 135–450)
PMV BLD AUTO: 8.7 FL (ref 5–10.5)
POTASSIUM SERPL-SCNC: 5.1 MMOL/L (ref 3.5–5.1)
RBC # BLD: 5.07 M/UL (ref 4–5.2)
SODIUM BLD-SCNC: 140 MMOL/L (ref 136–145)
TOTAL PROTEIN: 6.9 G/DL (ref 6.4–8.2)
TSH REFLEX: 1.07 UIU/ML (ref 0.27–4.2)
WBC # BLD: 7.5 K/UL (ref 4–11)

## 2019-07-25 PROCEDURE — 99214 OFFICE O/P EST MOD 30 MIN: CPT | Performed by: NURSE PRACTITIONER

## 2019-07-25 RX ORDER — LISINOPRIL 20 MG/1
20 TABLET ORAL DAILY
Qty: 90 TABLET | Refills: 3 | Status: SHIPPED | OUTPATIENT
Start: 2019-07-25 | End: 2020-07-01 | Stop reason: SDUPTHER

## 2019-07-25 ASSESSMENT — ENCOUNTER SYMPTOMS
SHORTNESS OF BREATH: 1
DIARRHEA: 0
ROS SKIN COMMENTS: ITCHING

## 2019-07-27 LAB — LYME, EIA: 0.33 LIV (ref 0–1.2)

## 2019-07-28 LAB
WEST NILE VIRUS, IGG: 0.23 IV
WEST NILE VIRUS, IGM: 0.05 IV

## 2019-08-02 NOTE — TELEPHONE ENCOUNTER
Spoke to the pt. Stated she will not be wearing a monitor. Advised us to cancel the appointment with Dr. Natalie Valenzuela.

## 2019-09-05 ENCOUNTER — NURSE ONLY (OUTPATIENT)
Dept: OTHER | Facility: CLINIC | Age: 74
End: 2019-09-05

## 2019-09-05 NOTE — PROGRESS NOTES
Your patient was contacted as part of the prevention campaign outreach. I have reviewed her chart for colorectal cancer screening and identified that this patient is overdue for a FIT test.      Yasmany Perkins was mailed a FIT kit from [employer].         [employer] Coordinator

## 2019-09-05 NOTE — LETTER
CARLIE Uribe CNP  [phone] [id number]      September 5, 2019    [address] [facility] [address] [facility],     I am reaching out to you on behalf of your primary care provider, CARLIE Uribe CNP, to help ensure their patients complete the important colon cancer screening test called, Fecal Immunochemical Test or FIT for short. Although you have completed this test before, it must be completed yearly to be the most effective in colon cancer screening. I am sending this FIT kit to you and asking that you return it to your primary care provider within a week. For your convenience I have stamped and addressed the return envelope with your primary care provider's return address to return your kit.       Sincerely,    Neris Real    Member of your health care team

## 2019-09-27 DIAGNOSIS — Z12.11 COLON CANCER SCREENING: Primary | ICD-10-CM

## 2019-09-27 LAB
CONTROL: NORMAL
HEMOCCULT STL QL: NORMAL

## 2019-09-27 PROCEDURE — 82274 ASSAY TEST FOR BLOOD FECAL: CPT | Performed by: NURSE PRACTITIONER

## 2019-12-09 ENCOUNTER — OFFICE VISIT (OUTPATIENT)
Dept: INTERNAL MEDICINE CLINIC | Age: 74
End: 2019-12-09
Payer: MEDICARE

## 2019-12-09 VITALS
BODY MASS INDEX: 39.55 KG/M2 | DIASTOLIC BLOOD PRESSURE: 82 MMHG | HEART RATE: 88 BPM | HEIGHT: 67 IN | WEIGHT: 252 LBS | SYSTOLIC BLOOD PRESSURE: 118 MMHG

## 2019-12-09 DIAGNOSIS — I50.42 CHRONIC COMBINED SYSTOLIC (CONGESTIVE) AND DIASTOLIC (CONGESTIVE) HEART FAILURE (HCC): ICD-10-CM

## 2019-12-09 DIAGNOSIS — R06.02 SOB (SHORTNESS OF BREATH): ICD-10-CM

## 2019-12-09 DIAGNOSIS — E03.9 ACQUIRED HYPOTHYROIDISM: ICD-10-CM

## 2019-12-09 DIAGNOSIS — K21.9 GASTROESOPHAGEAL REFLUX DISEASE WITHOUT ESOPHAGITIS: ICD-10-CM

## 2019-12-09 DIAGNOSIS — M79.7 FIBROMYALGIA: ICD-10-CM

## 2019-12-09 DIAGNOSIS — I48.91 RAPID ATRIAL FIBRILLATION (HCC): ICD-10-CM

## 2019-12-09 DIAGNOSIS — I10 ESSENTIAL HYPERTENSION: Primary | ICD-10-CM

## 2019-12-09 DIAGNOSIS — F41.9 CHRONIC ANXIETY: ICD-10-CM

## 2019-12-09 DIAGNOSIS — N18.30 CHRONIC KIDNEY DISEASE, STAGE 3 (MODERATE): ICD-10-CM

## 2019-12-09 DIAGNOSIS — Z23 NEED FOR 23-POLYVALENT PNEUMOCOCCAL POLYSACCHARIDE VACCINE: ICD-10-CM

## 2019-12-09 PROCEDURE — G0009 ADMIN PNEUMOCOCCAL VACCINE: HCPCS | Performed by: NURSE PRACTITIONER

## 2019-12-09 PROCEDURE — 99214 OFFICE O/P EST MOD 30 MIN: CPT | Performed by: NURSE PRACTITIONER

## 2019-12-09 PROCEDURE — 90732 PPSV23 VACC 2 YRS+ SUBQ/IM: CPT | Performed by: NURSE PRACTITIONER

## 2019-12-09 RX ORDER — PANTOPRAZOLE SODIUM 20 MG/1
20 TABLET, DELAYED RELEASE ORAL
Qty: 90 TABLET | Refills: 3 | Status: SHIPPED | OUTPATIENT
Start: 2019-12-09 | End: 2020-07-01 | Stop reason: SDUPTHER

## 2019-12-09 RX ORDER — LEVOTHYROXINE SODIUM 112 UG/1
112 TABLET ORAL DAILY
Qty: 90 TABLET | Refills: 3 | Status: SHIPPED | OUTPATIENT
Start: 2019-12-09 | End: 2020-07-01 | Stop reason: SDUPTHER

## 2019-12-09 RX ORDER — METOPROLOL TARTRATE 50 MG/1
50 TABLET, FILM COATED ORAL DAILY
Qty: 90 TABLET | Refills: 3 | Status: SHIPPED | OUTPATIENT
Start: 2019-12-09 | End: 2020-07-01 | Stop reason: SDUPTHER

## 2019-12-09 RX ORDER — AMITRIPTYLINE HYDROCHLORIDE 25 MG/1
25 TABLET, FILM COATED ORAL NIGHTLY
COMMUNITY
End: 2019-12-09 | Stop reason: SDUPTHER

## 2019-12-09 RX ORDER — SERTRALINE HYDROCHLORIDE 100 MG/1
100 TABLET, FILM COATED ORAL DAILY
COMMUNITY
End: 2019-12-09 | Stop reason: SDUPTHER

## 2019-12-09 RX ORDER — SERTRALINE HYDROCHLORIDE 100 MG/1
100 TABLET, FILM COATED ORAL DAILY
Qty: 90 TABLET | Refills: 3 | Status: SHIPPED | OUTPATIENT
Start: 2019-12-09 | End: 2020-07-01

## 2019-12-09 RX ORDER — FUROSEMIDE 40 MG/1
TABLET ORAL
Qty: 90 TABLET | Refills: 3 | Status: SHIPPED | OUTPATIENT
Start: 2019-12-09 | End: 2020-04-24

## 2019-12-09 RX ORDER — CLONAZEPAM 1 MG/1
1 TABLET ORAL NIGHTLY PRN
Qty: 30 TABLET | Refills: 0 | Status: SHIPPED | OUTPATIENT
Start: 2019-12-09 | End: 2020-07-01 | Stop reason: SDUPTHER

## 2019-12-09 RX ORDER — AMITRIPTYLINE HYDROCHLORIDE 25 MG/1
25 TABLET, FILM COATED ORAL NIGHTLY
Qty: 90 TABLET | Refills: 3 | Status: SHIPPED | OUTPATIENT
Start: 2019-12-09 | End: 2020-07-01 | Stop reason: SDUPTHER

## 2019-12-10 ENCOUNTER — TELEPHONE (OUTPATIENT)
Dept: FAMILY MEDICINE CLINIC | Age: 74
End: 2019-12-10

## 2019-12-10 ASSESSMENT — ENCOUNTER SYMPTOMS: GASTROINTESTINAL NEGATIVE: 1

## 2019-12-12 LAB
6-ACETYLMORPHINE: NOT DETECTED
7-AMINOCLONAZEPAM: NOT DETECTED
ALPHA-OH-ALPRAZOLAM: NOT DETECTED
ALPRAZOLAM: NOT DETECTED
AMPHETAMINE: NOT DETECTED
BARBITURATES: NOT DETECTED
BENZOYLECGONINE: NOT DETECTED
BUPRENORPHINE: NOT DETECTED
CARISOPRODOL: NOT DETECTED
CLONAZEPAM: NOT DETECTED
CODEINE: NOT DETECTED
CREATININE URINE: 54.8 MG/DL (ref 20–400)
DIAZEPAM: NOT DETECTED
DRUGS EXPECTED: NORMAL
EER PAIN MGT DRUG PANEL, HIGH RES/EMIT U: NORMAL
ETHYL GLUCURONIDE: NOT DETECTED
FENTANYL: NOT DETECTED
HYDROCODONE: NOT DETECTED
HYDROMORPHONE: NOT DETECTED
LORAZEPAM: NOT DETECTED
MARIJUANA METABOLITE: NOT DETECTED
MDA: NOT DETECTED
MDEA: NOT DETECTED
MDMA URINE: NOT DETECTED
MEPERIDINE: NOT DETECTED
METHADONE: NOT DETECTED
METHAMPHETAMINE: NOT DETECTED
METHYLPHENIDATE: NOT DETECTED
MIDAZOLAM: NOT DETECTED
MORPHINE: NOT DETECTED
NORBUPRENORPHINE, FREE: NOT DETECTED
NORDIAZEPAM: NOT DETECTED
NORFENTANYL: NOT DETECTED
NORHYDROCODONE, URINE: NOT DETECTED
NOROXYCODONE: NOT DETECTED
NOROXYMORPHONE, URINE: NOT DETECTED
OXAZEPAM: NOT DETECTED
OXYCODONE: NOT DETECTED
OXYMORPHONE: NOT DETECTED
PAIN MANAGEMENT DRUG PANEL: NORMAL
PAIN MANAGEMENT DRUG PANEL: NORMAL
PCP: NOT DETECTED
PHENTERMINE: NOT DETECTED
PROPOXYPHENE: NOT DETECTED
TAPENTADOL, URINE: NOT DETECTED
TAPENTADOL-O-SULFATE, URINE: NOT DETECTED
TEMAZEPAM: NOT DETECTED
TRAMADOL: NOT DETECTED
ZOLPIDEM: NOT DETECTED

## 2020-04-22 ENCOUNTER — TELEPHONE (OUTPATIENT)
Dept: INTERNAL MEDICINE CLINIC | Age: 75
End: 2020-04-22

## 2020-04-24 RX ORDER — FUROSEMIDE 40 MG/1
TABLET ORAL
Qty: 90 TABLET | Refills: 1 | Status: SHIPPED | OUTPATIENT
Start: 2020-04-24 | End: 2020-07-01 | Stop reason: SDUPTHER

## 2020-07-01 ENCOUNTER — OFFICE VISIT (OUTPATIENT)
Dept: INTERNAL MEDICINE CLINIC | Age: 75
End: 2020-07-01
Payer: MEDICARE

## 2020-07-01 VITALS
HEART RATE: 88 BPM | BODY MASS INDEX: 40.35 KG/M2 | SYSTOLIC BLOOD PRESSURE: 120 MMHG | WEIGHT: 257.6 LBS | TEMPERATURE: 97.2 F | DIASTOLIC BLOOD PRESSURE: 78 MMHG

## 2020-07-01 PROBLEM — E66.01 MORBID OBESITY DUE TO EXCESS CALORIES (HCC): Status: ACTIVE | Noted: 2020-07-01

## 2020-07-01 PROCEDURE — 99215 OFFICE O/P EST HI 40 MIN: CPT | Performed by: NURSE PRACTITIONER

## 2020-07-01 PROCEDURE — 3288F FALL RISK ASSESSMENT DOCD: CPT | Performed by: NURSE PRACTITIONER

## 2020-07-01 RX ORDER — CLONAZEPAM 1 MG/1
1 TABLET ORAL NIGHTLY PRN
Qty: 30 TABLET | Refills: 0 | Status: SHIPPED | OUTPATIENT
Start: 2020-07-01 | End: 2021-09-28 | Stop reason: SDUPTHER

## 2020-07-01 RX ORDER — AMITRIPTYLINE HYDROCHLORIDE 25 MG/1
25 TABLET, FILM COATED ORAL NIGHTLY
Qty: 90 TABLET | Refills: 3 | Status: SHIPPED | OUTPATIENT
Start: 2020-07-01 | End: 2021-09-28 | Stop reason: SDUPTHER

## 2020-07-01 RX ORDER — LISINOPRIL 20 MG/1
20 TABLET ORAL DAILY
Qty: 90 TABLET | Refills: 3 | Status: SHIPPED | OUTPATIENT
Start: 2020-07-01 | End: 2021-06-07

## 2020-07-01 RX ORDER — METOPROLOL TARTRATE 50 MG/1
50 TABLET, FILM COATED ORAL DAILY
Qty: 90 TABLET | Refills: 3 | Status: SHIPPED | OUTPATIENT
Start: 2020-07-01 | End: 2021-06-04

## 2020-07-01 RX ORDER — PANTOPRAZOLE SODIUM 20 MG/1
20 TABLET, DELAYED RELEASE ORAL
Qty: 90 TABLET | Refills: 3 | Status: SHIPPED | OUTPATIENT
Start: 2020-07-01 | End: 2021-09-28 | Stop reason: SDUPTHER

## 2020-07-01 RX ORDER — FUROSEMIDE 40 MG/1
TABLET ORAL
Qty: 90 TABLET | Refills: 1 | Status: SHIPPED | OUTPATIENT
Start: 2020-07-01 | End: 2020-12-28

## 2020-07-01 RX ORDER — ALBUTEROL SULFATE 90 UG/1
2 AEROSOL, METERED RESPIRATORY (INHALATION) 4 TIMES DAILY PRN
Qty: 1 INHALER | Refills: 5 | Status: SHIPPED | OUTPATIENT
Start: 2020-07-01 | End: 2021-09-28 | Stop reason: SDUPTHER

## 2020-07-01 RX ORDER — LEVOTHYROXINE SODIUM 112 UG/1
112 TABLET ORAL DAILY
Qty: 90 TABLET | Refills: 3 | Status: SHIPPED | OUTPATIENT
Start: 2020-07-01 | End: 2021-09-28 | Stop reason: SDUPTHER

## 2020-07-01 ASSESSMENT — ENCOUNTER SYMPTOMS
WHEEZING: 0
SHORTNESS OF BREATH: 1
CONSTIPATION: 0
VOMITING: 0
BLOOD IN STOOL: 0
APNEA: 0
ABDOMINAL PAIN: 0
EYE PAIN: 0
EYE REDNESS: 0
ABDOMINAL DISTENTION: 0
DIARRHEA: 0
CHEST TIGHTNESS: 0
NAUSEA: 0
RHINORRHEA: 0
BACK PAIN: 0
COUGH: 1
SINUS PRESSURE: 0

## 2020-07-01 NOTE — PROGRESS NOTES
7/1/20     Chief Complaint   Patient presents with    Leg Pain     bilateral, ran out of nerve pain meds a month ago     HPI     Patient is here for follow-up, she is overdue and out of medications. She complains her leg pain is been out of control. But she has been out of her amitriptyline which she takes for her fibromyalgia. She has been taking lots of aleve the past month since running out of medication. She has a history of CKD stage III. She has been noncompliant with cardiology follow up. She has a history of HTN, HLD, CHF -she reports she has been taking her blood pressure medication and Lasix intermittently. History of ablation for her afib and denies any palpitations. Pt reports increased intermittent cough and intermittent SOB over the past few months. Ongoing concern. History of asthma - she is not using any inhalers. She denies wheezing, increased swelling, URI symptoms, CP, palpitations, dizziness, headaches. Allergies   Allergen Reactions    Benadryl [Diphenhydramine]      Accelerated heart rate     Current Outpatient Medications   Medication Sig Dispense Refill    amitriptyline (ELAVIL) 25 MG tablet Take 1 tablet by mouth nightly 90 tablet 3    furosemide (LASIX) 40 MG tablet TAKE 1 TABLET BY MOUTH EVERY DAY 90 tablet 1    metoprolol tartrate (LOPRESSOR) 50 MG tablet Take 1 tablet by mouth daily 90 tablet 3    levothyroxine (SYNTHROID) 112 MCG tablet Take 1 tablet by mouth Daily 90 tablet 3    pantoprazole (PROTONIX) 20 MG tablet Take 1 tablet by mouth every morning (before breakfast) 90 tablet 3    clonazePAM (KLONOPIN) 1 MG tablet Take 1 tablet by mouth nightly as needed for Anxiety for up to 90 days.  30 tablet 0    lisinopril (PRINIVIL;ZESTRIL) 20 MG tablet Take 1 tablet by mouth daily 90 tablet 3    albuterol sulfate HFA (VENTOLIN HFA) 108 (90 Base) MCG/ACT inhaler Inhale 2 puffs into the lungs 4 times daily as needed for Wheezing 1 Inhaler 5     No current facility-administered medications for this visit. Review of Systems   Constitutional: Negative for appetite change, chills, fatigue and fever. HENT: Negative for congestion, ear pain, rhinorrhea and sinus pressure. Eyes: Negative for pain, redness and visual disturbance. Respiratory: Positive for cough and shortness of breath. Negative for apnea, chest tightness and wheezing. Cardiovascular: Negative for chest pain, palpitations and leg swelling. Gastrointestinal: Negative for abdominal distention, abdominal pain, blood in stool, constipation, diarrhea, nausea and vomiting. Endocrine: Negative for cold intolerance, heat intolerance, polydipsia, polyphagia and polyuria. Genitourinary: Negative for difficulty urinating, dysuria, enuresis, frequency, hematuria and urgency. Musculoskeletal: Positive for arthralgias and myalgias. Negative for back pain, gait problem, joint swelling and neck pain. Skin: Negative for rash and wound. Allergic/Immunologic: Negative for environmental allergies, food allergies and immunocompromised state. Neurological: Negative for dizziness, tremors, syncope, light-headedness, numbness and headaches. Hematological: Negative for adenopathy. Does not bruise/bleed easily. Psychiatric/Behavioral: Negative for agitation, behavioral problems and confusion. The patient is not nervous/anxious. Vitals:    07/01/20 1532   BP: 120/78   Pulse: 88   Temp: 97.2 °F (36.2 °C)   Weight: 257 lb 9.6 oz (116.8 kg)      Physical Exam  Vitals signs reviewed. Constitutional:       General: She is not in acute distress. Appearance: She is well-developed. She is not ill-appearing or diaphoretic. HENT:      Head: Normocephalic and atraumatic. Cardiovascular:      Rate and Rhythm: Normal rate and regular rhythm. Heart sounds: Normal heart sounds. No murmur. Pulmonary:      Effort: Pulmonary effort is normal. No respiratory distress.       Breath sounds: Wheezing (Scattered - end expiratory) present. No rhonchi or rales. Musculoskeletal:      Right lower leg: No edema. Left lower leg: No edema. Skin:     General: Skin is warm and dry. Neurological:      General: No focal deficit present. Mental Status: She is alert and oriented to person, place, and time. Psychiatric:         Mood and Affect: Mood and affect normal.         Behavior: Behavior normal.         Assessment/Plan     1. Fibromyalgia  Stable, restarting medication for control  Stop using NSAIDs  - amitriptyline (ELAVIL) 25 MG tablet; Take 1 tablet by mouth nightly  Dispense: 90 tablet; Refill: 3    2. Essential hypertension  Stable, controlled on current regimen. Encourage medication compliance  - furosemide (LASIX) 40 MG tablet; TAKE 1 TABLET BY MOUTH EVERY DAY  Dispense: 90 tablet; Refill: 1  - metoprolol tartrate (LOPRESSOR) 50 MG tablet; Take 1 tablet by mouth daily  Dispense: 90 tablet; Refill: 3  - lisinopril (PRINIVIL;ZESTRIL) 20 MG tablet; Take 1 tablet by mouth daily  Dispense: 90 tablet; Refill: 3  - Basic Metabolic Panel; Future    3. Rapid atrial fibrillation (HCC)  Stable, controlled on current regimen. History of an ablation  - metoprolol tartrate (LOPRESSOR) 50 MG tablet; Take 1 tablet by mouth daily  Dispense: 90 tablet; Refill: 3    4. Chronic combined systolic (congestive) and diastolic (congestive) heart failure (HCC)  Stable, controlled on current regimen. Reviewed diuretic compliance, continue beta-blocker  - metoprolol tartrate (LOPRESSOR) 50 MG tablet; Take 1 tablet by mouth daily  Dispense: 90 tablet; Refill: 3    5. Acquired hypothyroidism  Stable, checking blood work  - levothyroxine (SYNTHROID) 112 MCG tablet; Take 1 tablet by mouth Daily  Dispense: 90 tablet; Refill: 3  - TSH WITH REFLEX TO FT4; Future    6. Gastroesophageal reflux disease without esophagitis  Stable, controlled on current regimen. - pantoprazole (PROTONIX) 20 MG tablet;  Take 1 tablet by mouth every morning (before breakfast)  Dispense: 90 tablet; Refill: 3    7. Chronic anxiety  Stable, controlled on current regimen. - clonazePAM (KLONOPIN) 1 MG tablet; Take 1 tablet by mouth nightly as needed for Anxiety for up to 90 days. Dispense: 30 tablet; Refill: 0    8. Chronic kidney disease, stage 3 (moderate) (HCC)  Stable, likely worsening given increased NSAID use  We discussed in detail NSAIDs and renal disease  - Basic Metabolic Panel; Future    9. Severe single current episode of major depressive disorder, without psychotic features ([address]  Stable, controlled on current regimen. 10. Morbid obesity due to excess calories (HCC)  Stable, controlled on current regimen. 11. Cough  Stable, ongoing  Given few scattered end expiratory wheezes - use albuterol as needed  Checking x-ray given persistence  Reviewed compliance with cardiology and medications for CHF  - XR CHEST STANDARD (2 VW); Future  - albuterol sulfate HFA (VENTOLIN HFA) 108 (90 Base) MCG/ACT inhaler; Inhale 2 puffs into the lungs 4 times daily as needed for Wheezing  Dispense: 1 Inhaler; Refill: 5    Controlled Substance Monitoring:    Acute and Chronic Pain Monitoring:   RX Monitoring 7/1/2020   Attestation -   Periodic Controlled Substance Monitoring Possible medication side effects, risk of tolerance/dependence & alternative treatments discussed. ;No signs of potential drug abuse or diversion identified. Discussed medications with patient, who voiced understanding of their use and indications. All questions answered. Return in about 3 months (around 10/1/2020), or if symptoms worsen or fail to improve. Electronically signed by CARLIE Valdes CNP on 7/1/2020 at 3:55 PM     45 min spent with patient- >50 % continuity of care and/or counseling.

## 2020-07-08 ENCOUNTER — TELEPHONE (OUTPATIENT)
Dept: ADMINISTRATIVE | Age: 75
End: 2020-07-08

## 2020-07-09 ENCOUNTER — HOSPITAL ENCOUNTER (OUTPATIENT)
Dept: GENERAL RADIOLOGY | Age: 75
Discharge: HOME | End: 2020-07-09
Payer: MEDICARE

## 2020-07-09 ENCOUNTER — HOSPITAL ENCOUNTER (OUTPATIENT)
Age: 75
Discharge: HOME | End: 2020-07-09
Payer: MEDICARE

## 2020-07-09 PROCEDURE — 71046 X-RAY EXAM CHEST 2 VIEWS: CPT

## 2020-12-28 RX ORDER — FUROSEMIDE 40 MG/1
TABLET ORAL
Qty: 90 TABLET | Refills: 3 | Status: SHIPPED | OUTPATIENT
Start: 2020-12-28 | End: 2021-04-01 | Stop reason: SDUPTHER

## 2021-03-31 DIAGNOSIS — I10 ESSENTIAL HYPERTENSION: ICD-10-CM

## 2021-04-01 RX ORDER — FUROSEMIDE 40 MG/1
TABLET ORAL
Qty: 90 TABLET | Refills: 1 | Status: SHIPPED | OUTPATIENT
Start: 2021-04-01 | End: 2021-09-28 | Stop reason: SDUPTHER

## 2021-06-05 DIAGNOSIS — I10 ESSENTIAL HYPERTENSION: ICD-10-CM

## 2021-06-07 RX ORDER — LISINOPRIL 20 MG/1
TABLET ORAL
Qty: 30 TABLET | Refills: 0 | Status: SHIPPED | OUTPATIENT
Start: 2021-06-07 | End: 2021-09-28 | Stop reason: SDUPTHER

## 2021-09-28 ENCOUNTER — OFFICE VISIT (OUTPATIENT)
Dept: INTERNAL MEDICINE CLINIC | Age: 76
End: 2021-09-28
Payer: MEDICARE

## 2021-09-28 VITALS
WEIGHT: 250 LBS | HEIGHT: 67 IN | SYSTOLIC BLOOD PRESSURE: 138 MMHG | DIASTOLIC BLOOD PRESSURE: 76 MMHG | OXYGEN SATURATION: 97 % | HEART RATE: 80 BPM | BODY MASS INDEX: 39.24 KG/M2

## 2021-09-28 DIAGNOSIS — F32.2 SEVERE SINGLE CURRENT EPISODE OF MAJOR DEPRESSIVE DISORDER, WITHOUT PSYCHOTIC FEATURES (HCC): ICD-10-CM

## 2021-09-28 DIAGNOSIS — Z78.0 POSTMENOPAUSAL: ICD-10-CM

## 2021-09-28 DIAGNOSIS — R30.0 DYSURIA: ICD-10-CM

## 2021-09-28 DIAGNOSIS — Z00.00 ROUTINE GENERAL MEDICAL EXAMINATION AT A HEALTH CARE FACILITY: Primary | ICD-10-CM

## 2021-09-28 DIAGNOSIS — I10 ESSENTIAL HYPERTENSION: ICD-10-CM

## 2021-09-28 DIAGNOSIS — F41.9 CHRONIC ANXIETY: ICD-10-CM

## 2021-09-28 DIAGNOSIS — K21.9 GASTROESOPHAGEAL REFLUX DISEASE WITHOUT ESOPHAGITIS: ICD-10-CM

## 2021-09-28 DIAGNOSIS — R05.9 COUGH: ICD-10-CM

## 2021-09-28 DIAGNOSIS — I48.91 RAPID ATRIAL FIBRILLATION (HCC): ICD-10-CM

## 2021-09-28 DIAGNOSIS — E03.9 ACQUIRED HYPOTHYROIDISM: ICD-10-CM

## 2021-09-28 DIAGNOSIS — I50.42 CHRONIC COMBINED SYSTOLIC (CONGESTIVE) AND DIASTOLIC (CONGESTIVE) HEART FAILURE (HCC): ICD-10-CM

## 2021-09-28 DIAGNOSIS — M79.7 FIBROMYALGIA: ICD-10-CM

## 2021-09-28 PROCEDURE — G0438 PPPS, INITIAL VISIT: HCPCS | Performed by: NURSE PRACTITIONER

## 2021-09-28 RX ORDER — AMITRIPTYLINE HYDROCHLORIDE 25 MG/1
25 TABLET, FILM COATED ORAL NIGHTLY
Qty: 90 TABLET | Refills: 3 | Status: SHIPPED | OUTPATIENT
Start: 2021-09-28 | End: 2022-10-11 | Stop reason: SDUPTHER

## 2021-09-28 RX ORDER — CLONAZEPAM 1 MG/1
1 TABLET ORAL NIGHTLY PRN
Qty: 30 TABLET | Refills: 0 | Status: SHIPPED | OUTPATIENT
Start: 2021-09-28 | End: 2022-10-11 | Stop reason: SDUPTHER

## 2021-09-28 RX ORDER — ALBUTEROL SULFATE 90 UG/1
2 AEROSOL, METERED RESPIRATORY (INHALATION) 4 TIMES DAILY PRN
Qty: 3 EACH | Refills: 3 | Status: SHIPPED | OUTPATIENT
Start: 2021-09-28

## 2021-09-28 RX ORDER — FUROSEMIDE 40 MG/1
TABLET ORAL
Qty: 90 TABLET | Refills: 3 | Status: SHIPPED | OUTPATIENT
Start: 2021-09-28 | End: 2022-08-22

## 2021-09-28 RX ORDER — PANTOPRAZOLE SODIUM 20 MG/1
20 TABLET, DELAYED RELEASE ORAL
Qty: 90 TABLET | Refills: 3 | Status: SHIPPED | OUTPATIENT
Start: 2021-09-28 | End: 2022-09-06

## 2021-09-28 RX ORDER — LEVOTHYROXINE SODIUM 112 UG/1
112 TABLET ORAL DAILY
Qty: 90 TABLET | Refills: 3 | Status: SHIPPED | OUTPATIENT
Start: 2021-09-28 | End: 2022-09-06

## 2021-09-28 RX ORDER — LISINOPRIL 20 MG/1
20 TABLET ORAL DAILY
Qty: 90 TABLET | Refills: 3 | Status: ON HOLD | OUTPATIENT
Start: 2021-09-28 | End: 2022-07-09 | Stop reason: SDUPTHER

## 2021-09-28 RX ORDER — METOPROLOL TARTRATE 50 MG/1
50 TABLET, FILM COATED ORAL DAILY
Qty: 90 TABLET | Refills: 3 | Status: ON HOLD | OUTPATIENT
Start: 2021-09-28 | End: 2022-07-09 | Stop reason: HOSPADM

## 2021-09-28 ASSESSMENT — PATIENT HEALTH QUESTIONNAIRE - PHQ9
1. LITTLE INTEREST OR PLEASURE IN DOING THINGS: 0
SUM OF ALL RESPONSES TO PHQ QUESTIONS 1-9: 0
SUM OF ALL RESPONSES TO PHQ QUESTIONS 1-9: 0
SUM OF ALL RESPONSES TO PHQ9 QUESTIONS 1 & 2: 0
2. FEELING DOWN, DEPRESSED OR HOPELESS: 0
SUM OF ALL RESPONSES TO PHQ QUESTIONS 1-9: 0

## 2021-09-28 ASSESSMENT — LIFESTYLE VARIABLES: HOW OFTEN DO YOU HAVE A DRINK CONTAINING ALCOHOL: 0

## 2021-09-28 NOTE — PROGRESS NOTES
Medicare Annual Wellness Visit  Name: Mimi Monet Date: 2021   MRN: [id number] Sex: Female   Age: 67 y.o. Ethnicity: Non- / Non    : 1945 Race: White (non-)      Stephon Marinelli is here for Medicare AWV, Anxiety (med refill), and Hypertension    Screenings for behavioral, psychosocial and functional/safety risks, and cognitive dysfunction are all negative except as indicated below. These results, as well as other patient data from the [address] form, are documented in Flowsheets linked to this Encounter. Allergies   Allergen Reactions    Benadryl [Diphenhydramine]      Accelerated heart rate       Prior to Visit Medications    Medication Sig Taking? Authorizing Provider   sertraline (ZOLOFT) 50 MG tablet Take 50 mg by mouth daily Yes Historical Provider, MD   lisinopril (PRINIVIL;ZESTRIL) 20 MG tablet TAKE 1 TABLET DAILY Yes CARLIE Roche CNP   metoprolol tartrate (LOPRESSOR) 50 MG tablet Take 1 tablet by mouth daily Yes CARLIE Roche CNP   furosemide (LASIX) 40 MG tablet TAKE 1 TABLET DAILY Yes CARLIE Roche CNP   amitriptyline (ELAVIL) 25 MG tablet Take 1 tablet by mouth nightly Yes CARLIE Solorzano CNP   levothyroxine (SYNTHROID) 112 MCG tablet Take 1 tablet by mouth Daily Yes CARLIE Edmond CNP   pantoprazole (PROTONIX) 20 MG tablet Take 1 tablet by mouth every morning (before breakfast) Yes CARLIE Edmond CNP   albuterol sulfate HFA (VENTOLIN HFA) 108 (90 Base) MCG/ACT inhaler Inhale 2 puffs into the lungs 4 times daily as needed for Wheezing Yes CARLIE Edmond CNP   clonazePAM (KLONOPIN) 1 MG tablet Take 1 tablet by mouth nightly as needed for Anxiety for up to 90 days.   CARLIE Solorzano CNP       Past Medical History:   Diagnosis Date    Arthritis     Asthma     Atrial fibrillation ([facility] [address] 2011    Depression     Diverticulitis     DVT     post partum    GERD (gastroesophageal reflux disease)     Hx of blood clots     leg    Hypertension     Insomnia     Other disorders of kidney and ureter     hypothyroid    Pneumonia 11/7/2011    Shingles     Thyroid disease        Past Surgical History:   Procedure Laterality Date    CARDIAC SURGERY  2012    minimaze    CARDIOVERSION  09/2018    CHOLECYSTECTOMY, LAPAROSCOPIC  03/10/2017    with gram        Family History   Problem Relation Age of Onset    Lupus Sister     Arthritis Brother     High Blood Pressure Brother     Cancer Brother         prostate    Depression Brother     High Blood Pressure Brother     Diabetes Brother     Lupus Sister        CareTeam (Including outside providers/suppliers regularly involved in providing care):   Patient Care Team:  CARLIE Brock CNP as PCP - General (Family Nurse Practitioner)  CARLIE Brock CNP as PCP - [facility] Empaneled Provider  Ramon Noe MD as Consulting Physician (Cardiology)    Wt Readings from Last 3 Encounters:   09/28/21 250 lb (113.4 kg)   07/01/20 257 lb 9.6 oz (116.8 kg)   12/09/19 252 lb (114.3 kg)     Vitals:    09/28/21 1446   BP: 138/76   Pulse: 80   SpO2: 97%   Weight: 250 lb (113.4 kg)   Height: 5' 7\" (1.702 m)     Body mass index is 39.16 kg/m². Based upon direct observation of the patient, evaluation of cognition reveals recent and remote memory intact. Patient's complete Health Risk Assessment and screening values have been reviewed and are found in Flowsheets. The following problems were reviewed today and where indicated follow up appointments were made and/or referrals ordered.     Positive Risk Factor Screenings with Interventions:      Cognitive:  Clock Drawing Test (CDT) Score: Normal  Total Score Interpretation: Positive Mini-Cog  Cognitive Impairment Interventions:  · Monitor       General Health and ACP:  General  In general, how would you say your health is?: Fair  In the past 7 days, have you experienced any of the following?  New or Increased Pain, New or Increased Fatigue, Loneliness, Social Isolation, Stress or Anger?: None of These  Do you get the social and emotional support that you need?: Yes  Do you have a Living Will?: (!) No  Advance Directives     Power of  Living Will ACP-Advance Directive ACP-Power of     Not on File Filed on 09/13/11 Filed Not on File      General Health Risk Interventions:  · No Living Will: Patient declines ACP discussion/assistance    Health Habits/Nutrition:  Health Habits/Nutrition  Do you exercise for at least 20 minutes 2-3 times per week?: (!) No  Have you lost any weight without trying in the past 3 months?: No  Do you eat only one meal per day?: No  Have you seen the dentist within the past year?: Yes  Body mass index: (!) 39.15  Health Habits/Nutrition Interventions:  · Monitor    Hearing/Vision:  No exam data present  Hearing/Vision  Do you or your family notice any trouble with your hearing that hasn't been managed with hearing aids?: No  Do you have difficulty driving, watching TV, or doing any of your daily activities because of your eyesight?: No  Have you had an eye exam within the past year?: (!) No  Hearing/Vision Interventions:  · Vision concerns:  patient encouraged to make appointment with his/her eye specialist      Personalized Preventive Plan   Current Health Maintenance Status  Immunization History   Administered Date(s) Administered    COVID-19, [email or web address], PF, 30mcg/0.3mL 03/22/2021, 04/19/2021    Influenza, High Dose (Fluzone 65 yrs and older) 10/15/2015    Pneumococcal Polysaccharide ([id number]) 12/09/2019        Health Maintenance   Topic Date Due    DTaP/Tdap/Td vaccine (1 - Tdap) Never done    Shingles Vaccine (1 of 2) Never done    DEXA (modify frequency per FRAX score)  Never done   [employer] Visit (AWV)  Never done    TSH testing  07/01/2021    Potassium monitoring  07/01/2021    Creatinine monitoring  07/01/2021    Flu vaccine (1) 09/01/2021    Pneumococcal 65+ years Vaccine  Completed    COVID-19 Vaccine  Completed    Hepatitis C screen  Completed    Hepatitis A vaccine  Aged Out    Hepatitis B vaccine  Aged Out    Hib vaccine  Aged Out    Meningococcal (ACWY) vaccine  Aged Out     Recommendations for Op[place] Due: see orders and patient instructions/AVS.  . Recommended screening schedule for the next 5-10 years is provided to the patient in written form: see Patient Instructions/AVS.      Carl Buitrago was seen today for medicare awv, anxiety and hypertension. Diagnoses and all orders for this visit:    Routine general medical examination at a health care facility  -     TSH with Reflex; Future  -     COMPREHENSIVE METABOLIC PANEL; Future  -     CBC WITH AUTO DIFFERENTIAL; Future  -     Hemoglobin A1C; Future    Essential hypertension  -     lisinopril (PRINIVIL;ZESTRIL) 20 MG tablet; Take 1 tablet by mouth daily  -     metoprolol tartrate (LOPRESSOR) 50 MG tablet; Take 1 tablet by mouth daily  -     furosemide (LASIX) 40 MG tablet; TAKE 1 TABLET DAILY  -     COMPREHENSIVE METABOLIC PANEL; Future    Rapid atrial fibrillation (HCC)  -     metoprolol tartrate (LOPRESSOR) 50 MG tablet; Take 1 tablet by mouth daily    Chronic combined systolic (congestive) and diastolic (congestive) heart failure (HCC)  -     metoprolol tartrate (LOPRESSOR) 50 MG tablet; Take 1 tablet by mouth daily    Fibromyalgia  -     amitriptyline (ELAVIL) 25 MG tablet; Take 1 tablet by mouth nightly    Acquired hypothyroidism  -     levothyroxine (SYNTHROID) 112 MCG tablet; Take 1 tablet by mouth Daily  -     TSH with Reflex; Future    Gastroesophageal reflux disease without esophagitis  -     pantoprazole (PROTONIX) 20 MG tablet; Take 1 tablet by mouth every morning (before breakfast)    Cough  -     albuterol sulfate HFA (VENTOLIN HFA) 108 (90 Base) MCG/ACT inhaler;  Inhale 2 puffs into the lungs 4 times daily as needed for Wheezing    Chronic anxiety  -     sertraline (ZOLOFT) 50 MG tablet; Take 1 tablet by mouth daily  -     clonazePAM (KLONOPIN) 1 MG tablet; Take 1 tablet by mouth nightly as needed for Anxiety for up to 90 days. Postmenopausal  -     DEXA BONE DENSITY 2 SITES; Future    Dysuria  -     URINE RT REFLEX TO CULTURE; Future    Severe single current episode of major depressive disorder, without psychotic features (HCC)  -     sertraline (ZOLOFT) 50 MG tablet; Take 1 tablet by mouth daily             Burning with unination  Lower abdoinal pain  freq  5 days.         Thu Naqvi, APRN - CNP

## 2021-09-28 NOTE — PATIENT INSTRUCTIONS
Personalized Preventive Plan for Diane Cheema - 9/28/2021  Medicare offers a range of preventive health benefits. Some of the tests and screenings are paid in full while other may be subject to a deductible, co-insurance, and/or copay. Some of these benefits include a comprehensive review of your medical history including lifestyle, illnesses that may run in your family, and various assessments and screenings as appropriate. After reviewing your medical record and screening and assessments performed today your provider may have ordered immunizations, labs, imaging, and/or referrals for you. A list of these orders (if applicable) as well as your Preventive Care list are included within your After Visit Summary for your review. Other Preventive Recommendations:    · A preventive eye exam performed by an eye specialist is recommended every 1-2 years to screen for glaucoma; cataracts, macular degeneration, and other eye disorders. · A preventive dental visit is recommended every 6 months. · Try to get at least 150 minutes of exercise per week or 10,000 steps per day on a pedometer . · Order or download the FREE \"Exercise & Physical Activity: Your Everyday Guide\" from [employer]. Call 5-856.415.5625 or search [employer] online. · You need 1179-3868 mg of calcium and 7747-1503 IU of vitamin D per day. It is possible to meet your calcium requirement with diet alone, but a vitamin D supplement is usually necessary to meet this goal.  · When exposed to the sun, use a sunscreen that protects against both UVA and UVB radiation with an SPF of 30 or greater. Reapply every 2 to 3 hours or after sweating, drying off with a towel, or swimming. · Always wear a seat belt when traveling in a car. Always wear a helmet when riding a bicycle or motorcycle.
5

## 2021-09-30 ENCOUNTER — TELEPHONE (OUTPATIENT)
Dept: INTERNAL MEDICINE CLINIC | Age: 76
End: 2021-09-30

## 2021-09-30 NOTE — TELEPHONE ENCOUNTER
Patient called to request urine results. She states it feels like things are getting a little worse and she thought Kelly Turpin was sending something in for her. I advised some of her results were sent to [facility]. She states she doesn't use [facility]. I deactivated her account. Please call back.

## 2021-09-30 NOTE — TELEPHONE ENCOUNTER
Spoke with pt - advised as urine result states that no evidence of UTI   And to push fluids    Pt wanted copy of labs --printed for her

## 2021-09-30 NOTE — TELEPHONE ENCOUNTER
UA did not show significant evidence of bacterial infection. Recommend increasing fluids to flush out urinary system. If worsening, please call office.

## 2022-03-24 ENCOUNTER — OFFICE VISIT (OUTPATIENT)
Dept: INTERNAL MEDICINE CLINIC | Age: 77
End: 2022-03-24
Payer: MEDICARE

## 2022-03-24 VITALS
HEART RATE: 79 BPM | TEMPERATURE: 97.3 F | DIASTOLIC BLOOD PRESSURE: 80 MMHG | WEIGHT: 254.8 LBS | OXYGEN SATURATION: 99 % | SYSTOLIC BLOOD PRESSURE: 115 MMHG | HEIGHT: 67 IN | BODY MASS INDEX: 39.99 KG/M2

## 2022-03-24 DIAGNOSIS — R82.90 ABNORMAL URINE ODOR: ICD-10-CM

## 2022-03-24 DIAGNOSIS — R10.9 FLANK PAIN: ICD-10-CM

## 2022-03-24 DIAGNOSIS — R30.0 DYSURIA: Primary | ICD-10-CM

## 2022-03-24 LAB
BILIRUBIN, POC: NORMAL
BLOOD URINE, POC: NORMAL
CLARITY, POC: CLEAR
COLOR, POC: YELLOW
GLUCOSE URINE, POC: NORMAL
KETONES, POC: NORMAL
LEUKOCYTE EST, POC: NORMAL
NITRITE, POC: NORMAL
PH, POC: 7
PROTEIN, POC: NORMAL
SPECIFIC GRAVITY, POC: 1.01
UROBILINOGEN, POC: 0.2

## 2022-03-24 PROCEDURE — 81002 URINALYSIS NONAUTO W/O SCOPE: CPT | Performed by: NURSE PRACTITIONER

## 2022-03-24 PROCEDURE — 99213 OFFICE O/P EST LOW 20 MIN: CPT | Performed by: NURSE PRACTITIONER

## 2022-03-24 RX ORDER — NITROFURANTOIN 25; 75 MG/1; MG/1
100 CAPSULE ORAL 2 TIMES DAILY
Qty: 10 CAPSULE | Refills: 0 | Status: SHIPPED | OUTPATIENT
Start: 2022-03-24 | End: 2022-03-29

## 2022-03-24 ASSESSMENT — ENCOUNTER SYMPTOMS
BLOOD IN STOOL: 0
NAUSEA: 0
COUGH: 0
VOMITING: 0
ABDOMINAL PAIN: 0
DIARRHEA: 0
SHORTNESS OF BREATH: 0
CONSTIPATION: 0

## 2022-03-24 NOTE — PROGRESS NOTES
Acute Office Visit  3/24/2022    SUBJECTIVE:    Patient ID: Piotr Velasquez is a 65 y.o. female. Chief Complaint   Patient presents with    Dysuria     Flank pain to both sides with abnormal odor to urine. Symptoms started almost 2 weeks ago     HPI: The patient presents to the office for an acute visit. Pt reports dysuria, abnormal odor of her urine and bilateral flank pain. Symptoms for 2 weeks. Denies hematuria, nocturia, urinary frequency, urgency or hesitancy. Denies abdominal pain. Denies vaginal discharge or bleeding. Denies fever/chills. Staying hydrated per pt. Tried drinking cranberry juice without relief. Allergies   Allergen Reactions    Benadryl [Diphenhydramine]      Accelerated heart rate     Current Outpatient Medications   Medication Sig Dispense Refill    nitrofurantoin, macrocrystal-monohydrate, (MACROBID) 100 MG capsule Take 1 capsule by mouth 2 times daily for 5 days 10 capsule 0    sertraline (ZOLOFT) 50 MG tablet Take 1 tablet by mouth daily 90 tablet 3    lisinopril (PRINIVIL;ZESTRIL) 20 MG tablet Take 1 tablet by mouth daily 90 tablet 3    metoprolol tartrate (LOPRESSOR) 50 MG tablet Take 1 tablet by mouth daily 90 tablet 3    furosemide (LASIX) 40 MG tablet TAKE 1 TABLET DAILY 90 tablet 3    amitriptyline (ELAVIL) 25 MG tablet Take 1 tablet by mouth nightly 90 tablet 3    levothyroxine (SYNTHROID) 112 MCG tablet Take 1 tablet by mouth Daily 90 tablet 3    pantoprazole (PROTONIX) 20 MG tablet Take 1 tablet by mouth every morning (before breakfast) 90 tablet 3    albuterol sulfate HFA (VENTOLIN HFA) 108 (90 Base) MCG/ACT inhaler Inhale 2 puffs into the lungs 4 times daily as needed for Wheezing 3 each 3    clonazePAM (KLONOPIN) 1 MG tablet Take 1 tablet by mouth nightly as needed for Anxiety for up to 90 days. 30 tablet 0     No current facility-administered medications for this visit.       Review of Systems   Constitutional: Negative for appetite change, chills, fatigue and fever. Respiratory: Negative for cough and shortness of breath. Cardiovascular: Negative for chest pain. Gastrointestinal: Negative for abdominal pain, blood in stool, constipation, diarrhea, nausea and vomiting. Genitourinary: Positive for dysuria and flank pain. Negative for decreased urine volume, difficulty urinating, frequency, genital sores, hematuria, urgency, vaginal bleeding, vaginal discharge and vaginal pain. Abnormal urinary odor   Skin: Negative for pallor. OBJECTIVE:  /80 (Site: Right Upper Arm)   Pulse 79   Temp 97.3 °F (36.3 °C) (Temporal)   Ht 5' 7\" (1.702 m)   Wt 254 lb 12.8 oz (115.6 kg)   SpO2 99%   BMI 39.91 kg/m²    Physical Exam  Vitals reviewed. Constitutional:       General: She is not in acute distress. Appearance: She is well-developed. She is not diaphoretic. HENT:      Head: Normocephalic. Cardiovascular:      Rate and Rhythm: Normal rate and regular rhythm. Pulmonary:      Effort: Pulmonary effort is normal. No respiratory distress. Breath sounds: Normal breath sounds. No wheezing. Abdominal:      General: Bowel sounds are normal. There is no distension. Palpations: Abdomen is soft. There is no mass. Tenderness: There is no abdominal tenderness. There is no guarding or rebound. Genitourinary:     Comments: No CVA tenderness noted  Skin:     General: Skin is warm and dry. Neurological:      Mental Status: She is alert and oriented to person, place, and time. ASSESSMENT/PLAN:  Mi Robertson was seen today for dysuria. Diagnoses and all orders for this visit:    Dysuria/Abnormal urine odor/Flank pain  -     POCT Urinalysis no Micro  -     Culture, Urine  -     nitrofurantoin, macrocrystal-monohydrate, (MACROBID) 100 MG capsule; Take 1 capsule by mouth 2 times daily for 5 days - patient education handout provided and reviewed with the pt.   - Recommend patient increase water intake  - Pt will call if symptoms worsen or fail to improve  - Red flag warning signs reviewed with the pt and she will go to the ER if these occur. Return for as previously scheduled or sooner if needed. Pt informed to call if symptoms worsen or fail to improve. All questions answered. Patient states no further questions or concerns at this time.     Electronically signed by CARLIE Keane CNP 03/24/22

## 2022-03-24 NOTE — PATIENT INSTRUCTIONS
Take all antibiotic as prescribed  Increase water intake    Patient Education   nitrofurantoin  Pronunciation:  STACY DUENAS toin  Brand:  Macrobid, Macrodantin  What is the most important information I should know about nitrofurantoin? You should not take nitrofurantoin if you have severe kidney disease, urination problems, or a history of jaundice or liver problems caused by nitrofurantoin. Do not take nitrofurantoin during late pregnancy (from 38 weeks through delivery). What is nitrofurantoin? Nitrofurantoin is an antibiotic that is used to treat urinary tract infections caused by bacteria. Nitrofurantoin may also be used for purposes not listed in this medication guide. What should I discuss with my healthcare provider before taking nitrofurantoin? You should not take nitrofurantoin if you are allergic to it, or if you have:  · severe kidney disease;  · urination problems (little or no urination); or  · a history of jaundice or liver problems caused by taking nitrofurantoin. Do not take nitrofurantoin during late pregnancy (from 38 weeks through delivery). Tell your doctor if you have ever had:  · kidney disease;  · anemia;  · diabetes;  · an electrolyte imbalance or vitamin B deficiency;  · glucose-6-phosphate dehydrogenase (G6PD) deficiency; or  · any type of debilitating disease. You should not breastfeed a baby younger than 2 month old while you are taking nitrofurantoin. Nitrofurantoin should not be given to a child younger than 2 month old. How should I take nitrofurantoin? Follow all directions on your prescription label and read all medication guides or instruction sheets. Use the medicine exactly as directed. Take nitrofurantoin with food, even if you take it at bedtime. Shake the oral suspension (liquid) before you measure a dose. Use the dosing syringe provided, or use a medicine dose-measuring device (not a kitchen spoon).   You may need to keep taking nitrofurantoin for up to 7 days after lab tests show that the infection has cleared. Follow your doctor's instructions. Use this medicine for the full prescribed length of time, even if your symptoms quickly improve. Skipping doses can increase your risk of infection that is resistant to medication. Nitrofurantoin will not treat a viral infection such as the flu or a common cold. This medicine can affect the results of certain medical tests. Tell any doctor who treats you that you are using nitrofurantoin. If you use this medicine long-term, you may need frequent medical tests. Store at room temperature away from moisture, heat, and light. Do not freeze the liquid medicine, and keep the bottle tightly closed when not in use. Throw away any nitrofurantoin liquid that has not been used within 30 days. What happens if I miss a dose? Take the medicine as soon as you can, but skip the missed dose if it is almost time for your next dose. Do not take two doses at one time. What happens if I overdose? Seek emergency medical attention or call the Poison Help line at 1-869.355.6618. Overdose can cause vomiting. What should I avoid while taking nitrofurantoin? Antibiotic medicines can cause diarrhea, which may be a sign of a new infection. If you have diarrhea that is watery or bloody, call your doctor before using anti-diarrhea medicine. Avoid taking an antacid that contains magnesium trisilicate, which could make it harder for your body to absorb nitrofurantoin. What are the possible side effects of nitrofurantoin? Get emergency medical help if you have signs of an allergic reaction (hives, difficult breathing, swelling in your face or throat) or a severe skin reaction (fever, sore throat, burning eyes, skin pain, red or purple skin rash with blistering and peeling).   Call your doctor at once if you have:  · severe stomach pain, diarrhea that is watery or bloody (even if it occurs months after your last dose);  · vision problems;  · fever, chills, cough, chest pain, trouble breathing;  · numbness, tingling, or burning pain in your hands or feet;  · severe pain behind your eyes;  · pale skin, weakness;  · joint pain or swelling with fever, swollen glands, and muscle aches;  · pain, redness, or swelling in your lower jaw;  · increased pressure inside the skull --severe headaches, ringing in your ears, dizziness, nausea, vision problems, pain behind your eyes; or  · signs of liver or pancreas problems --upper stomach pain (that may spread to your back), nausea or vomiting, dark urine, yellowing of the skin or eyes. Side effects may be more likely in older adults. Common side effects may include:  · headache, dizziness, drowsiness, weakness;  · gas, indigestion, loss of appetite;  · nausea, vomiting;  · muscle or joint pain;  · rash, itching; or  · temporary hair loss. This is not a complete list of side effects and others may occur. Call your doctor for medical advice about side effects. You may report side effects to FDA at 2-940-FDA-6602. What other drugs will affect nitrofurantoin? Other drugs may affect nitrofurantoin, including prescription and over-the-counter medicines, vitamins, and herbal products. Tell your doctor about all your current medicines and any medicine you start or stop using. Where can I get more information? Your pharmacist can provide more information about nitrofurantoin. Remember, keep this and all other medicines out of the reach of children, never share your medicines with others, and use this medication only for the indication prescribed. Every effort has been made to ensure that the information provided by [address] is accurate, up-to-date, and complete, but no guarantee is made to that effect. Drug information contained herein may be time sensitive.  [employer] information has been compiled for use by healthcare practitioners and consumers in the [place] and therefore [employer] does not warrant that uses outside of the [place] are appropriate, unless specifically indicated otherwise. [employer]'s drug information does not endorse drugs, diagnose patients or recommend therapy. [facility] drug information is an informational resource designed to assist licensed healthcare practitioners in caring for their patients and/or to serve consumers viewing this service as a supplement to, and not a substitute for, the expertise, skill, knowledge and judgment of healthcare practitioners. The absence of a warning for a given drug or drug combination in no way should be construed to indicate that the drug or drug combination is safe, effective or appropriate for any given patient. [facility] does not assume any responsibility for any aspect of healthcare administered with the aid of information [facility] provides. The information contained herein is not intended to cover all possible uses, directions, precautions, warnings, drug interactions, allergic reactions, or adverse effects. If you have questions about the drugs you are taking, check with your doctor, nurse or pharmacist.  Copyright 8662-5570 [address]: 9.02. Revision date: 2/24/2020. Care instructions adapted under license by [facility] ([facility]). If you have questions about a medical condition or this instruction, always ask your healthcare professional. [place] [address] any warranty or liability for your use of this information.

## 2022-03-26 LAB
ORGANISM: ABNORMAL
URINE CULTURE, ROUTINE: ABNORMAL

## 2022-03-29 RX ORDER — SULFAMETHOXAZOLE AND TRIMETHOPRIM 800; 160 MG/1; MG/1
1 TABLET ORAL 2 TIMES DAILY
Qty: 6 TABLET | Refills: 0 | Status: SHIPPED | OUTPATIENT
Start: 2022-03-29 | End: 2022-04-01

## 2022-06-27 ENCOUNTER — ANESTHESIA (OUTPATIENT)
Dept: OPERATING ROOM | Age: 77
DRG: 853 | End: 2022-06-27
Payer: MEDICARE

## 2022-06-27 ENCOUNTER — ANESTHESIA EVENT (OUTPATIENT)
Dept: OPERATING ROOM | Age: 77
DRG: 853 | End: 2022-06-27
Payer: MEDICARE

## 2022-06-27 ENCOUNTER — APPOINTMENT (OUTPATIENT)
Dept: CT IMAGING | Age: 77
DRG: 853 | End: 2022-06-27
Payer: MEDICARE

## 2022-06-27 ENCOUNTER — HOSPITAL ENCOUNTER (INPATIENT)
Age: 77
LOS: 12 days | Discharge: HOME HEALTH | DRG: 853 | End: 2022-07-09
Attending: EMERGENCY MEDICINE | Admitting: INTERNAL MEDICINE
Payer: MEDICARE

## 2022-06-27 DIAGNOSIS — I10 ESSENTIAL HYPERTENSION: ICD-10-CM

## 2022-06-27 DIAGNOSIS — A41.9 SEPTICEMIA (HCC): ICD-10-CM

## 2022-06-27 DIAGNOSIS — K56.2 CECAL VOLVULUS (HCC): Primary | ICD-10-CM

## 2022-06-27 DIAGNOSIS — K56.2 VOLVULUS (HCC): ICD-10-CM

## 2022-06-27 LAB
A/G RATIO: 1.4 (ref 1.1–2.2)
ALBUMIN SERPL-MCNC: 4.6 G/DL (ref 3.4–5)
ALP BLD-CCNC: 81 U/L (ref 40–129)
ALT SERPL-CCNC: 38 U/L (ref 10–40)
ANION GAP SERPL CALCULATED.3IONS-SCNC: 13 MMOL/L (ref 3–16)
AST SERPL-CCNC: 33 U/L (ref 15–37)
BASOPHILS ABSOLUTE: 0.1 K/UL (ref 0–0.2)
BASOPHILS RELATIVE PERCENT: 0.4 %
BILIRUB SERPL-MCNC: 0.5 MG/DL (ref 0–1)
BUN BLDV-MCNC: 15 MG/DL (ref 7–20)
CALCIUM SERPL-MCNC: 10.6 MG/DL (ref 8.3–10.6)
CHLORIDE BLD-SCNC: 99 MMOL/L (ref 99–110)
CO2: 24 MMOL/L (ref 21–32)
CREAT SERPL-MCNC: 1.3 MG/DL (ref 0.6–1.2)
EOSINOPHILS ABSOLUTE: 0.1 K/UL (ref 0–0.6)
EOSINOPHILS RELATIVE PERCENT: 0.4 %
GFR AFRICAN AMERICAN: 48
GFR NON-AFRICAN AMERICAN: 40
GLUCOSE BLD-MCNC: 180 MG/DL (ref 70–99)
HCT VFR BLD CALC: 48.9 % (ref 36–48)
HEMOGLOBIN: 15.7 G/DL (ref 12–16)
LACTIC ACID, SEPSIS: 2 MMOL/L (ref 0.4–1.9)
LACTIC ACID, SEPSIS: 2.8 MMOL/L (ref 0.4–1.9)
LIPASE: 107 U/L (ref 13–60)
LYMPHOCYTES ABSOLUTE: 1.4 K/UL (ref 1–5.1)
LYMPHOCYTES RELATIVE PERCENT: 9.7 %
MCH RBC QN AUTO: 28.5 PG (ref 26–34)
MCHC RBC AUTO-ENTMCNC: 32.1 G/DL (ref 31–36)
MCV RBC AUTO: 88.6 FL (ref 80–100)
MONOCYTES ABSOLUTE: 0.8 K/UL (ref 0–1.3)
MONOCYTES RELATIVE PERCENT: 5.5 %
NEUTROPHILS ABSOLUTE: 12.4 K/UL (ref 1.7–7.7)
NEUTROPHILS RELATIVE PERCENT: 84 %
PDW BLD-RTO: 14 % (ref 12.4–15.4)
PLATELET # BLD: 292 K/UL (ref 135–450)
PMV BLD AUTO: 8.5 FL (ref 5–10.5)
POTASSIUM SERPL-SCNC: 4.9 MMOL/L (ref 3.5–5.1)
PRO-BNP: 389 PG/ML (ref 0–449)
RBC # BLD: 5.52 M/UL (ref 4–5.2)
SODIUM BLD-SCNC: 136 MMOL/L (ref 136–145)
TOTAL PROTEIN: 7.8 G/DL (ref 6.4–8.2)
TROPONIN: <0.01 NG/ML
WBC # BLD: 14.7 K/UL (ref 4–11)

## 2022-06-27 PROCEDURE — 1200000000 HC SEMI PRIVATE

## 2022-06-27 PROCEDURE — 6360000002 HC RX W HCPCS: Performed by: PHYSICIAN ASSISTANT

## 2022-06-27 PROCEDURE — 74176 CT ABD & PELVIS W/O CONTRAST: CPT

## 2022-06-27 PROCEDURE — 2709999900 HC NON-CHARGEABLE SUPPLY: Performed by: SURGERY

## 2022-06-27 PROCEDURE — 84484 ASSAY OF TROPONIN QUANT: CPT

## 2022-06-27 PROCEDURE — 99222 1ST HOSP IP/OBS MODERATE 55: CPT | Performed by: SURGERY

## 2022-06-27 PROCEDURE — 7100000001 HC PACU RECOVERY - ADDTL 15 MIN: Performed by: SURGERY

## 2022-06-27 PROCEDURE — 83880 ASSAY OF NATRIURETIC PEPTIDE: CPT

## 2022-06-27 PROCEDURE — 83605 ASSAY OF LACTIC ACID: CPT

## 2022-06-27 PROCEDURE — 3600000014 HC SURGERY LEVEL 4 ADDTL 15MIN: Performed by: SURGERY

## 2022-06-27 PROCEDURE — 2500000003 HC RX 250 WO HCPCS: Performed by: SURGERY

## 2022-06-27 PROCEDURE — 3700000000 HC ANESTHESIA ATTENDED CARE: Performed by: SURGERY

## 2022-06-27 PROCEDURE — 87040 BLOOD CULTURE FOR BACTERIA: CPT

## 2022-06-27 PROCEDURE — 6360000002 HC RX W HCPCS: Performed by: EMERGENCY MEDICINE

## 2022-06-27 PROCEDURE — 2720000010 HC SURG SUPPLY STERILE: Performed by: SURGERY

## 2022-06-27 PROCEDURE — 2500000003 HC RX 250 WO HCPCS: Performed by: ANESTHESIOLOGY

## 2022-06-27 PROCEDURE — 93005 ELECTROCARDIOGRAM TRACING: CPT | Performed by: PHYSICIAN ASSISTANT

## 2022-06-27 PROCEDURE — 83690 ASSAY OF LIPASE: CPT

## 2022-06-27 PROCEDURE — 88307 TISSUE EXAM BY PATHOLOGIST: CPT

## 2022-06-27 PROCEDURE — 85025 COMPLETE CBC W/AUTO DIFF WBC: CPT

## 2022-06-27 PROCEDURE — 80053 COMPREHEN METABOLIC PANEL: CPT

## 2022-06-27 PROCEDURE — 96374 THER/PROPH/DIAG INJ IV PUSH: CPT

## 2022-06-27 PROCEDURE — 96375 TX/PRO/DX INJ NEW DRUG ADDON: CPT

## 2022-06-27 PROCEDURE — 3600000004 HC SURGERY LEVEL 4 BASE: Performed by: SURGERY

## 2022-06-27 PROCEDURE — 2100000000 HC CCU R&B

## 2022-06-27 PROCEDURE — 36415 COLL VENOUS BLD VENIPUNCTURE: CPT

## 2022-06-27 PROCEDURE — 3700000001 HC ADD 15 MINUTES (ANESTHESIA): Performed by: SURGERY

## 2022-06-27 PROCEDURE — 6360000002 HC RX W HCPCS: Performed by: ANESTHESIOLOGY

## 2022-06-27 PROCEDURE — 7100000000 HC PACU RECOVERY - FIRST 15 MIN: Performed by: SURGERY

## 2022-06-27 PROCEDURE — 99285 EMERGENCY DEPT VISIT HI MDM: CPT

## 2022-06-27 PROCEDURE — 2580000003 HC RX 258: Performed by: EMERGENCY MEDICINE

## 2022-06-27 RX ORDER — BUPIVACAINE HYDROCHLORIDE 2.5 MG/ML
INJECTION, SOLUTION EPIDURAL; INFILTRATION; INTRACAUDAL
Status: COMPLETED | OUTPATIENT
Start: 2022-06-27 | End: 2022-06-27

## 2022-06-27 RX ORDER — MORPHINE SULFATE 4 MG/ML
4 INJECTION, SOLUTION INTRAMUSCULAR; INTRAVENOUS EVERY 30 MIN PRN
Status: DISCONTINUED | OUTPATIENT
Start: 2022-06-27 | End: 2022-06-28 | Stop reason: HOSPADM

## 2022-06-27 RX ORDER — 0.9 % SODIUM CHLORIDE 0.9 %
1000 INTRAVENOUS SOLUTION INTRAVENOUS ONCE
Status: DISCONTINUED | OUTPATIENT
Start: 2022-06-27 | End: 2022-06-27

## 2022-06-27 RX ORDER — ONDANSETRON 2 MG/ML
4 INJECTION INTRAMUSCULAR; INTRAVENOUS EVERY 6 HOURS PRN
Status: DISCONTINUED | OUTPATIENT
Start: 2022-06-27 | End: 2022-06-28 | Stop reason: SDUPTHER

## 2022-06-27 RX ORDER — SODIUM CHLORIDE 9 MG/ML
30 INJECTION, SOLUTION INTRAVENOUS ONCE
Status: COMPLETED | OUTPATIENT
Start: 2022-06-27 | End: 2022-06-27

## 2022-06-27 RX ADMIN — SODIUM CHLORIDE 1779 ML: 9 INJECTION, SOLUTION INTRAVENOUS at 22:49

## 2022-06-27 RX ADMIN — FENTANYL CITRATE 25 MCG: 50 INJECTION, SOLUTION INTRAMUSCULAR; INTRAVENOUS at 23:55

## 2022-06-27 RX ADMIN — MORPHINE SULFATE 4 MG: 4 INJECTION INTRAVENOUS at 22:11

## 2022-06-27 RX ADMIN — SUCCINYLCHOLINE CHLORIDE 140 MG: 20 INJECTION, SOLUTION INTRAMUSCULAR; INTRAVENOUS at 23:58

## 2022-06-27 RX ADMIN — SODIUM CHLORIDE, POTASSIUM CHLORIDE, SODIUM LACTATE AND CALCIUM CHLORIDE: 600; 310; 30; 20 INJECTION, SOLUTION INTRAVENOUS at 23:50

## 2022-06-27 RX ADMIN — PIPERACILLIN AND TAZOBACTAM 3375 MG: 3; .375 INJECTION, POWDER, LYOPHILIZED, FOR SOLUTION INTRAVENOUS at 23:02

## 2022-06-27 RX ADMIN — ONDANSETRON 4 MG: 2 INJECTION INTRAMUSCULAR; INTRAVENOUS at 22:11

## 2022-06-27 RX ADMIN — PROPOFOL 120 MG: 10 INJECTION, EMULSION INTRAVENOUS at 23:56

## 2022-06-27 RX ADMIN — LIDOCAINE HYDROCHLORIDE 80 MG: 20 INJECTION, SOLUTION EPIDURAL; INFILTRATION; INTRACAUDAL; PERINEURAL at 23:55

## 2022-06-27 RX ADMIN — PHENYLEPHRINE HYDROCHLORIDE 50 MCG: 10 INJECTION INTRAVENOUS at 23:56

## 2022-06-27 ASSESSMENT — ENCOUNTER SYMPTOMS
SHORTNESS OF BREATH: 0
RHINORRHEA: 0
ABDOMINAL PAIN: 1
VOMITING: 1
NAUSEA: 1
COUGH: 0
DIARRHEA: 0

## 2022-06-27 ASSESSMENT — PAIN SCALES - GENERAL: PAINLEVEL_OUTOF10: 9

## 2022-06-27 ASSESSMENT — PAIN - FUNCTIONAL ASSESSMENT: PAIN_FUNCTIONAL_ASSESSMENT: 0-10

## 2022-06-28 LAB
ANION GAP SERPL CALCULATED.3IONS-SCNC: 9 MMOL/L (ref 3–16)
BASOPHILS ABSOLUTE: 0.1 K/UL (ref 0–0.2)
BASOPHILS RELATIVE PERCENT: 0.4 %
BUN BLDV-MCNC: 15 MG/DL (ref 7–20)
CALCIUM SERPL-MCNC: 9 MG/DL (ref 8.3–10.6)
CHLORIDE BLD-SCNC: 103 MMOL/L (ref 99–110)
CO2: 25 MMOL/L (ref 21–32)
CREAT SERPL-MCNC: 1 MG/DL (ref 0.6–1.2)
EKG ATRIAL RATE: 74 BPM
EKG DIAGNOSIS: NORMAL
EKG P AXIS: 84 DEGREES
EKG P-R INTERVAL: 146 MS
EKG Q-T INTERVAL: 388 MS
EKG QRS DURATION: 80 MS
EKG QTC CALCULATION (BAZETT): 430 MS
EKG R AXIS: -22 DEGREES
EKG T AXIS: 31 DEGREES
EKG VENTRICULAR RATE: 74 BPM
EOSINOPHILS ABSOLUTE: 0 K/UL (ref 0–0.6)
EOSINOPHILS RELATIVE PERCENT: 0 %
GFR AFRICAN AMERICAN: >60
GFR NON-AFRICAN AMERICAN: 54
GLUCOSE BLD-MCNC: 198 MG/DL (ref 70–99)
HCT VFR BLD CALC: 39.4 % (ref 36–48)
HEMOGLOBIN: 12.9 G/DL (ref 12–16)
LYMPHOCYTES ABSOLUTE: 0.3 K/UL (ref 1–5.1)
LYMPHOCYTES RELATIVE PERCENT: 2.4 %
MCH RBC QN AUTO: 29.2 PG (ref 26–34)
MCHC RBC AUTO-ENTMCNC: 32.8 G/DL (ref 31–36)
MCV RBC AUTO: 88.9 FL (ref 80–100)
MONOCYTES ABSOLUTE: 1 K/UL (ref 0–1.3)
MONOCYTES RELATIVE PERCENT: 7.8 %
NEUTROPHILS ABSOLUTE: 11.4 K/UL (ref 1.7–7.7)
NEUTROPHILS RELATIVE PERCENT: 89.4 %
PDW BLD-RTO: 14 % (ref 12.4–15.4)
PLATELET # BLD: 195 K/UL (ref 135–450)
PMV BLD AUTO: 8.8 FL (ref 5–10.5)
POTASSIUM REFLEX MAGNESIUM: 5.5 MMOL/L (ref 3.5–5.1)
POTASSIUM SERPL-SCNC: 5.2 MMOL/L (ref 3.5–5.1)
RBC # BLD: 4.44 M/UL (ref 4–5.2)
SODIUM BLD-SCNC: 137 MMOL/L (ref 136–145)
WBC # BLD: 12.8 K/UL (ref 4–11)

## 2022-06-28 PROCEDURE — 6360000002 HC RX W HCPCS: Performed by: ANESTHESIOLOGY

## 2022-06-28 PROCEDURE — 2580000003 HC RX 258: Performed by: NURSE PRACTITIONER

## 2022-06-28 PROCEDURE — 85025 COMPLETE CBC W/AUTO DIFF WBC: CPT

## 2022-06-28 PROCEDURE — 6360000002 HC RX W HCPCS: Performed by: INTERNAL MEDICINE

## 2022-06-28 PROCEDURE — C9290 INJ, BUPIVACAINE LIPOSOME: HCPCS | Performed by: SURGERY

## 2022-06-28 PROCEDURE — 6360000002 HC RX W HCPCS: Performed by: SURGERY

## 2022-06-28 PROCEDURE — C9113 INJ PANTOPRAZOLE SODIUM, VIA: HCPCS | Performed by: INTERNAL MEDICINE

## 2022-06-28 PROCEDURE — 93010 ELECTROCARDIOGRAM REPORT: CPT | Performed by: INTERNAL MEDICINE

## 2022-06-28 PROCEDURE — 80048 BASIC METABOLIC PNL TOTAL CA: CPT

## 2022-06-28 PROCEDURE — 2580000003 HC RX 258: Performed by: SURGERY

## 2022-06-28 PROCEDURE — 2580000003 HC RX 258: Performed by: ANESTHESIOLOGY

## 2022-06-28 PROCEDURE — 2500000003 HC RX 250 WO HCPCS: Performed by: ANESTHESIOLOGY

## 2022-06-28 PROCEDURE — 2500000003 HC RX 250 WO HCPCS: Performed by: SURGERY

## 2022-06-28 PROCEDURE — 6360000002 HC RX W HCPCS

## 2022-06-28 PROCEDURE — 2580000003 HC RX 258: Performed by: INTERNAL MEDICINE

## 2022-06-28 PROCEDURE — 44160 REMOVAL OF COLON: CPT | Performed by: SURGERY

## 2022-06-28 PROCEDURE — P9045 ALBUMIN (HUMAN), 5%, 250 ML: HCPCS | Performed by: ANESTHESIOLOGY

## 2022-06-28 PROCEDURE — APPSS15 APP SPLIT SHARED TIME 0-15 MINUTES: Performed by: NURSE PRACTITIONER

## 2022-06-28 PROCEDURE — APPNB30 APP NON BILLABLE TIME 0-30 MINS: Performed by: NURSE PRACTITIONER

## 2022-06-28 PROCEDURE — 1200000000 HC SEMI PRIVATE

## 2022-06-28 PROCEDURE — 84132 ASSAY OF SERUM POTASSIUM: CPT

## 2022-06-28 PROCEDURE — A4217 STERILE WATER/SALINE, 500 ML: HCPCS | Performed by: SURGERY

## 2022-06-28 PROCEDURE — 0DTF0ZZ RESECTION OF RIGHT LARGE INTESTINE, OPEN APPROACH: ICD-10-PCS | Performed by: SURGERY

## 2022-06-28 RX ORDER — DEXAMETHASONE SODIUM PHOSPHATE 4 MG/ML
INJECTION, SOLUTION INTRA-ARTICULAR; INTRALESIONAL; INTRAMUSCULAR; INTRAVENOUS; SOFT TISSUE PRN
Status: DISCONTINUED | OUTPATIENT
Start: 2022-06-28 | End: 2022-06-28 | Stop reason: SDUPTHER

## 2022-06-28 RX ORDER — POLYETHYLENE GLYCOL 3350 17 G/17G
17 POWDER, FOR SOLUTION ORAL DAILY PRN
Status: DISCONTINUED | OUTPATIENT
Start: 2022-06-28 | End: 2022-07-09 | Stop reason: HOSPADM

## 2022-06-28 RX ORDER — SUCCINYLCHOLINE CHLORIDE 20 MG/ML
INJECTION INTRAMUSCULAR; INTRAVENOUS PRN
Status: DISCONTINUED | OUTPATIENT
Start: 2022-06-28 | End: 2022-06-28 | Stop reason: SDUPTHER

## 2022-06-28 RX ORDER — OXYCODONE HYDROCHLORIDE 5 MG/1
5 TABLET ORAL EVERY 6 HOURS PRN
Status: DISCONTINUED | OUTPATIENT
Start: 2022-06-28 | End: 2022-07-06

## 2022-06-28 RX ORDER — SODIUM CHLORIDE 9 MG/ML
INJECTION, SOLUTION INTRAVENOUS CONTINUOUS
Status: DISCONTINUED | OUTPATIENT
Start: 2022-06-28 | End: 2022-06-28

## 2022-06-28 RX ORDER — DEXTROSE AND SODIUM CHLORIDE 5; .45 G/100ML; G/100ML
INJECTION, SOLUTION INTRAVENOUS CONTINUOUS
Status: DISCONTINUED | OUTPATIENT
Start: 2022-06-28 | End: 2022-06-30

## 2022-06-28 RX ORDER — ACETAMINOPHEN 650 MG/1
650 SUPPOSITORY RECTAL EVERY 6 HOURS PRN
Status: DISCONTINUED | OUTPATIENT
Start: 2022-06-28 | End: 2022-07-06

## 2022-06-28 RX ORDER — LIDOCAINE HYDROCHLORIDE 20 MG/ML
INJECTION, SOLUTION EPIDURAL; INFILTRATION; INTRACAUDAL; PERINEURAL PRN
Status: DISCONTINUED | OUTPATIENT
Start: 2022-06-28 | End: 2022-06-28 | Stop reason: SDUPTHER

## 2022-06-28 RX ORDER — LABETALOL HYDROCHLORIDE 5 MG/ML
5 INJECTION, SOLUTION INTRAVENOUS
Status: DISCONTINUED | OUTPATIENT
Start: 2022-06-28 | End: 2022-06-28 | Stop reason: HOSPADM

## 2022-06-28 RX ORDER — SODIUM CHLORIDE 0.9 % (FLUSH) 0.9 %
5-40 SYRINGE (ML) INJECTION EVERY 12 HOURS SCHEDULED
Status: DISCONTINUED | OUTPATIENT
Start: 2022-06-28 | End: 2022-07-09 | Stop reason: HOSPADM

## 2022-06-28 RX ORDER — MAGNESIUM HYDROXIDE 1200 MG/15ML
LIQUID ORAL CONTINUOUS PRN
Status: COMPLETED | OUTPATIENT
Start: 2022-06-28 | End: 2022-06-28

## 2022-06-28 RX ORDER — ONDANSETRON 2 MG/ML
4 INJECTION INTRAMUSCULAR; INTRAVENOUS
Status: DISCONTINUED | OUTPATIENT
Start: 2022-06-28 | End: 2022-06-28 | Stop reason: HOSPADM

## 2022-06-28 RX ORDER — HYDROMORPHONE HCL 110MG/55ML
0.5 PATIENT CONTROLLED ANALGESIA SYRINGE INTRAVENOUS EVERY 5 MIN PRN
Status: DISCONTINUED | OUTPATIENT
Start: 2022-06-28 | End: 2022-06-28 | Stop reason: HOSPADM

## 2022-06-28 RX ORDER — METOPROLOL TARTRATE 5 MG/5ML
2.5 INJECTION INTRAVENOUS EVERY 6 HOURS
Status: DISCONTINUED | OUTPATIENT
Start: 2022-06-28 | End: 2022-06-30

## 2022-06-28 RX ORDER — ONDANSETRON 2 MG/ML
4 INJECTION INTRAMUSCULAR; INTRAVENOUS EVERY 4 HOURS PRN
Status: DISCONTINUED | OUTPATIENT
Start: 2022-06-28 | End: 2022-06-28 | Stop reason: SDUPTHER

## 2022-06-28 RX ORDER — ROCURONIUM BROMIDE 10 MG/ML
INJECTION, SOLUTION INTRAVENOUS PRN
Status: DISCONTINUED | OUTPATIENT
Start: 2022-06-28 | End: 2022-06-28 | Stop reason: SDUPTHER

## 2022-06-28 RX ORDER — KETOROLAC TROMETHAMINE 30 MG/ML
15 INJECTION, SOLUTION INTRAMUSCULAR; INTRAVENOUS EVERY 6 HOURS PRN
Status: DISPENSED | OUTPATIENT
Start: 2022-06-28 | End: 2022-06-30

## 2022-06-28 RX ORDER — SODIUM CHLORIDE 0.9 % (FLUSH) 0.9 %
5-40 SYRINGE (ML) INJECTION EVERY 12 HOURS SCHEDULED
Status: DISCONTINUED | OUTPATIENT
Start: 2022-06-28 | End: 2022-06-28 | Stop reason: SDUPTHER

## 2022-06-28 RX ORDER — ONDANSETRON 2 MG/ML
INJECTION INTRAMUSCULAR; INTRAVENOUS PRN
Status: DISCONTINUED | OUTPATIENT
Start: 2022-06-28 | End: 2022-06-28 | Stop reason: SDUPTHER

## 2022-06-28 RX ORDER — ONDANSETRON 2 MG/ML
4 INJECTION INTRAMUSCULAR; INTRAVENOUS EVERY 6 HOURS PRN
Status: DISCONTINUED | OUTPATIENT
Start: 2022-06-28 | End: 2022-07-09 | Stop reason: HOSPADM

## 2022-06-28 RX ORDER — SODIUM CHLORIDE 9 MG/ML
INJECTION, SOLUTION INTRAVENOUS PRN
Status: DISCONTINUED | OUTPATIENT
Start: 2022-06-28 | End: 2022-06-28 | Stop reason: HOSPADM

## 2022-06-28 RX ORDER — SODIUM CHLORIDE 0.9 % (FLUSH) 0.9 %
5-40 SYRINGE (ML) INJECTION EVERY 12 HOURS SCHEDULED
Status: DISCONTINUED | OUTPATIENT
Start: 2022-06-28 | End: 2022-06-28 | Stop reason: HOSPADM

## 2022-06-28 RX ORDER — HYDROMORPHONE HCL 110MG/55ML
0.25 PATIENT CONTROLLED ANALGESIA SYRINGE INTRAVENOUS EVERY 5 MIN PRN
Status: DISCONTINUED | OUTPATIENT
Start: 2022-06-28 | End: 2022-06-28 | Stop reason: HOSPADM

## 2022-06-28 RX ORDER — SODIUM CHLORIDE 0.9 % (FLUSH) 0.9 %
5-40 SYRINGE (ML) INJECTION PRN
Status: DISCONTINUED | OUTPATIENT
Start: 2022-06-28 | End: 2022-07-09 | Stop reason: HOSPADM

## 2022-06-28 RX ORDER — SODIUM CHLORIDE 9 MG/ML
INJECTION, SOLUTION INTRAVENOUS PRN
Status: DISCONTINUED | OUTPATIENT
Start: 2022-06-28 | End: 2022-07-09 | Stop reason: HOSPADM

## 2022-06-28 RX ORDER — PROPOFOL 10 MG/ML
INJECTION, EMULSION INTRAVENOUS PRN
Status: DISCONTINUED | OUTPATIENT
Start: 2022-06-27 | End: 2022-06-28 | Stop reason: SDUPTHER

## 2022-06-28 RX ORDER — SODIUM CHLORIDE, SODIUM LACTATE, POTASSIUM CHLORIDE, CALCIUM CHLORIDE 600; 310; 30; 20 MG/100ML; MG/100ML; MG/100ML; MG/100ML
INJECTION, SOLUTION INTRAVENOUS CONTINUOUS PRN
Status: DISCONTINUED | OUTPATIENT
Start: 2022-06-28 | End: 2022-06-28 | Stop reason: SDUPTHER

## 2022-06-28 RX ORDER — ACETAMINOPHEN 500 MG
1000 TABLET ORAL EVERY 8 HOURS SCHEDULED
Status: DISPENSED | OUTPATIENT
Start: 2022-06-28 | End: 2022-07-01

## 2022-06-28 RX ORDER — HYDROMORPHONE HCL 110MG/55ML
PATIENT CONTROLLED ANALGESIA SYRINGE INTRAVENOUS
Status: COMPLETED
Start: 2022-06-28 | End: 2022-06-28

## 2022-06-28 RX ORDER — SODIUM CHLORIDE 9 MG/ML
INJECTION, SOLUTION INTRAVENOUS PRN
Status: DISCONTINUED | OUTPATIENT
Start: 2022-06-28 | End: 2022-06-28 | Stop reason: SDUPTHER

## 2022-06-28 RX ORDER — ENOXAPARIN SODIUM 100 MG/ML
40 INJECTION SUBCUTANEOUS NIGHTLY
Status: DISCONTINUED | OUTPATIENT
Start: 2022-06-29 | End: 2022-07-09 | Stop reason: HOSPADM

## 2022-06-28 RX ORDER — FENTANYL CITRATE 50 UG/ML
INJECTION, SOLUTION INTRAMUSCULAR; INTRAVENOUS PRN
Status: DISCONTINUED | OUTPATIENT
Start: 2022-06-28 | End: 2022-06-28 | Stop reason: SDUPTHER

## 2022-06-28 RX ORDER — SODIUM CHLORIDE 0.9 % (FLUSH) 0.9 %
5-40 SYRINGE (ML) INJECTION PRN
Status: DISCONTINUED | OUTPATIENT
Start: 2022-06-28 | End: 2022-06-28 | Stop reason: SDUPTHER

## 2022-06-28 RX ORDER — OXYCODONE HYDROCHLORIDE 5 MG/1
5 TABLET ORAL
Status: DISCONTINUED | OUTPATIENT
Start: 2022-06-28 | End: 2022-06-28 | Stop reason: HOSPADM

## 2022-06-28 RX ORDER — ONDANSETRON 4 MG/1
4 TABLET, ORALLY DISINTEGRATING ORAL EVERY 8 HOURS PRN
Status: DISCONTINUED | OUTPATIENT
Start: 2022-06-28 | End: 2022-07-09 | Stop reason: HOSPADM

## 2022-06-28 RX ORDER — DEXTROSE, SODIUM CHLORIDE, AND POTASSIUM CHLORIDE 5; .45; .15 G/100ML; G/100ML; G/100ML
INJECTION INTRAVENOUS CONTINUOUS
Status: DISCONTINUED | OUTPATIENT
Start: 2022-06-28 | End: 2022-06-28

## 2022-06-28 RX ORDER — ALBUMIN, HUMAN INJ 5% 5 %
SOLUTION INTRAVENOUS PRN
Status: DISCONTINUED | OUTPATIENT
Start: 2022-06-28 | End: 2022-06-28 | Stop reason: SDUPTHER

## 2022-06-28 RX ORDER — MORPHINE SULFATE 4 MG/ML
4 INJECTION, SOLUTION INTRAMUSCULAR; INTRAVENOUS
Status: DISCONTINUED | OUTPATIENT
Start: 2022-06-28 | End: 2022-07-05

## 2022-06-28 RX ORDER — MORPHINE SULFATE 2 MG/ML
2 INJECTION, SOLUTION INTRAMUSCULAR; INTRAVENOUS
Status: DISCONTINUED | OUTPATIENT
Start: 2022-06-28 | End: 2022-07-05

## 2022-06-28 RX ORDER — ACETAMINOPHEN 325 MG/1
650 TABLET ORAL EVERY 6 HOURS PRN
Status: DISCONTINUED | OUTPATIENT
Start: 2022-06-28 | End: 2022-07-06

## 2022-06-28 RX ORDER — METOCLOPRAMIDE HYDROCHLORIDE 5 MG/ML
10 INJECTION INTRAMUSCULAR; INTRAVENOUS EVERY 6 HOURS
Status: COMPLETED | OUTPATIENT
Start: 2022-06-28 | End: 2022-06-29

## 2022-06-28 RX ORDER — PANTOPRAZOLE SODIUM 40 MG/10ML
40 INJECTION, POWDER, LYOPHILIZED, FOR SOLUTION INTRAVENOUS DAILY
Status: DISCONTINUED | OUTPATIENT
Start: 2022-06-28 | End: 2022-07-09 | Stop reason: HOSPADM

## 2022-06-28 RX ORDER — SODIUM CHLORIDE 0.9 % (FLUSH) 0.9 %
5-40 SYRINGE (ML) INJECTION PRN
Status: DISCONTINUED | OUTPATIENT
Start: 2022-06-28 | End: 2022-06-28 | Stop reason: HOSPADM

## 2022-06-28 RX ADMIN — HYDROMORPHONE HYDROCHLORIDE 0.5 MG: 2 INJECTION, SOLUTION INTRAMUSCULAR; INTRAVENOUS; SUBCUTANEOUS at 01:49

## 2022-06-28 RX ADMIN — POTASSIUM CHLORIDE, DEXTROSE MONOHYDRATE AND SODIUM CHLORIDE: 150; 5; 450 INJECTION, SOLUTION INTRAVENOUS at 02:45

## 2022-06-28 RX ADMIN — PIPERACILLIN AND TAZOBACTAM 3375 MG: 3; .375 INJECTION, POWDER, LYOPHILIZED, FOR SOLUTION INTRAVENOUS at 21:39

## 2022-06-28 RX ADMIN — MORPHINE SULFATE 4 MG: 4 INJECTION INTRAVENOUS at 02:37

## 2022-06-28 RX ADMIN — METOCLOPRAMIDE 10 MG: 5 INJECTION, SOLUTION INTRAMUSCULAR; INTRAVENOUS at 21:40

## 2022-06-28 RX ADMIN — PIPERACILLIN AND TAZOBACTAM 3375 MG: 3; .375 INJECTION, POWDER, LYOPHILIZED, FOR SOLUTION INTRAVENOUS at 12:41

## 2022-06-28 RX ADMIN — ROCURONIUM BROMIDE 40 MG: 10 INJECTION, SOLUTION INTRAVENOUS at 00:06

## 2022-06-28 RX ADMIN — MORPHINE SULFATE 2 MG: 2 INJECTION, SOLUTION INTRAMUSCULAR; INTRAVENOUS at 08:15

## 2022-06-28 RX ADMIN — KETOROLAC TROMETHAMINE 15 MG: 30 INJECTION, SOLUTION INTRAMUSCULAR; INTRAVENOUS at 10:27

## 2022-06-28 RX ADMIN — METOCLOPRAMIDE 10 MG: 5 INJECTION, SOLUTION INTRAMUSCULAR; INTRAVENOUS at 02:37

## 2022-06-28 RX ADMIN — METOCLOPRAMIDE 10 MG: 5 INJECTION, SOLUTION INTRAMUSCULAR; INTRAVENOUS at 16:07

## 2022-06-28 RX ADMIN — KETOROLAC TROMETHAMINE 15 MG: 30 INJECTION, SOLUTION INTRAMUSCULAR; INTRAVENOUS at 21:40

## 2022-06-28 RX ADMIN — HYDROMORPHONE HYDROCHLORIDE 0.5 MG: 2 INJECTION, SOLUTION INTRAMUSCULAR; INTRAVENOUS; SUBCUTANEOUS at 01:53

## 2022-06-28 RX ADMIN — PHENYLEPHRINE HYDROCHLORIDE 100 MCG: 10 INJECTION INTRAVENOUS at 00:15

## 2022-06-28 RX ADMIN — Medication 10 ML: at 08:17

## 2022-06-28 RX ADMIN — ALBUMIN (HUMAN) 12.5 G: 12.5 INJECTION, SOLUTION INTRAVENOUS at 00:17

## 2022-06-28 RX ADMIN — METOCLOPRAMIDE 10 MG: 5 INJECTION, SOLUTION INTRAMUSCULAR; INTRAVENOUS at 08:15

## 2022-06-28 RX ADMIN — SUGAMMADEX 200 MG: 100 INJECTION, SOLUTION INTRAVENOUS at 01:10

## 2022-06-28 RX ADMIN — PIPERACILLIN AND TAZOBACTAM 3375 MG: 3; .375 INJECTION, POWDER, LYOPHILIZED, FOR SOLUTION INTRAVENOUS at 06:41

## 2022-06-28 RX ADMIN — DEXTROSE AND SODIUM CHLORIDE: 5; 450 INJECTION, SOLUTION INTRAVENOUS at 21:24

## 2022-06-28 RX ADMIN — KETOROLAC TROMETHAMINE 15 MG: 30 INJECTION, SOLUTION INTRAMUSCULAR; INTRAVENOUS at 16:07

## 2022-06-28 RX ADMIN — FENTANYL CITRATE 25 MCG: 50 INJECTION, SOLUTION INTRAMUSCULAR; INTRAVENOUS at 00:13

## 2022-06-28 RX ADMIN — ONDANSETRON 4 MG: 2 INJECTION INTRAMUSCULAR; INTRAVENOUS at 00:24

## 2022-06-28 RX ADMIN — DEXTROSE AND SODIUM CHLORIDE: 5; 450 INJECTION, SOLUTION INTRAVENOUS at 10:23

## 2022-06-28 RX ADMIN — DEXAMETHASONE SODIUM PHOSPHATE 4 MG: 4 INJECTION, SOLUTION INTRAMUSCULAR; INTRAVENOUS at 00:24

## 2022-06-28 RX ADMIN — PANTOPRAZOLE SODIUM 40 MG: 40 INJECTION, POWDER, LYOPHILIZED, FOR SOLUTION INTRAVENOUS at 08:16

## 2022-06-28 RX ADMIN — SODIUM CHLORIDE: 9 INJECTION, SOLUTION INTRAVENOUS at 02:16

## 2022-06-28 RX ADMIN — SODIUM CHLORIDE, POTASSIUM CHLORIDE, SODIUM LACTATE AND CALCIUM CHLORIDE: 600; 310; 30; 20 INJECTION, SOLUTION INTRAVENOUS at 00:05

## 2022-06-28 RX ADMIN — KETOROLAC TROMETHAMINE 15 MG: 30 INJECTION, SOLUTION INTRAMUSCULAR; INTRAVENOUS at 02:56

## 2022-06-28 RX ADMIN — FENTANYL CITRATE 50 MCG: 50 INJECTION, SOLUTION INTRAMUSCULAR; INTRAVENOUS at 00:25

## 2022-06-28 ASSESSMENT — PAIN SCALES - GENERAL
PAINLEVEL_OUTOF10: 5
PAINLEVEL_OUTOF10: 0
PAINLEVEL_OUTOF10: 5
PAINLEVEL_OUTOF10: 6
PAINLEVEL_OUTOF10: 0
PAINLEVEL_OUTOF10: 0
PAINLEVEL_OUTOF10: 7
PAINLEVEL_OUTOF10: 5
PAINLEVEL_OUTOF10: 5
PAINLEVEL_OUTOF10: 7
PAINLEVEL_OUTOF10: 0
PAINLEVEL_OUTOF10: 7

## 2022-06-28 ASSESSMENT — PAIN DESCRIPTION - DESCRIPTORS
DESCRIPTORS: ACHING;DULL;NAGGING
DESCRIPTORS: ACHING;DULL;NAGGING
DESCRIPTORS: ACHING;NAGGING;DULL

## 2022-06-28 ASSESSMENT — PAIN DESCRIPTION - ORIENTATION
ORIENTATION: MID
ORIENTATION: MID
ORIENTATION: ANTERIOR;MID
ORIENTATION: MID
ORIENTATION: MID

## 2022-06-28 ASSESSMENT — PAIN DESCRIPTION - LOCATION
LOCATION: ABDOMEN

## 2022-06-28 ASSESSMENT — PAIN DESCRIPTION - PAIN TYPE
TYPE: SURGICAL PAIN
TYPE: SURGICAL PAIN

## 2022-06-28 ASSESSMENT — PAIN DESCRIPTION - ONSET
ONSET: ON-GOING
ONSET: ON-GOING

## 2022-06-28 ASSESSMENT — PAIN DESCRIPTION - FREQUENCY
FREQUENCY: CONTINUOUS
FREQUENCY: CONTINUOUS

## 2022-06-28 NOTE — ED PROVIDER NOTES
[facility]        Pt Name: Saran Green  MRN: [id number]  [place] 1945  Date of evaluation: 6/27/2022  Provider: Arabella Navarro PA-C  PCP: CARLIE Wilder CNP  Note Started: 9:35 PM EDT        I have seen and evaluated this patient with my supervising physician Janel Eduardo MD.    [address] [place] [address]       Chief Complaint   Patient presents with    Flank Pain     right and left flank pain since this am with emesis        HISTORY OF PRESENT ILLNESS   (Location, Timing/Onset, Context/Setting, Quality, Duration, Modifying Factors, Severity, Associated Signs and Symptoms)  Note limiting factors. Chief Complaint: Abdominal pain, flank pain    Saran Green is a 65 y.o. female who presents to the emergency department today for evaluation for abdominal pain, and flank pain. The patient states that when she woke up this morning, states that she is generally not feeling well. The patient states that she has had pain to her epigastric abdomen with radiation throughout her abdomen, and to both flanks. The patient states that she is nauseous and has had several episodes of emesis. Patient denies any bilious emesis, hematemesis or coffee-ground emesis. Patient has not had any fever or chills. She denies any cough or congestion. She denies any chest pain or shortness of breath. She states that she had diarrhea yesterday but has not had any bowel movements today. She states that she is passing minimal gas. Patient denies any dysuria or hematuria. Patient is currently rating her pain as a 10/10, her pain is sharp, constant and she otherwise denies any known alleviating or aggravating factors. The patient has a past surgical history of a cholecystectomy. No other past surgical history of the abdomen. Nursing Notes were all reviewed and agreed with or any disagreements were addressed in the HPI.     REVIEW OF SYSTEMS (2-9 systems for level 4, 10 or more for level 5)     Review of Systems   Constitutional: Negative for activity change, appetite change, chills and fever. HENT: Negative for congestion and rhinorrhea. Respiratory: Negative for cough and shortness of breath. Cardiovascular: Negative for chest pain. Gastrointestinal: Positive for abdominal pain, nausea and vomiting. Negative for diarrhea. Genitourinary: Positive for flank pain. Negative for difficulty urinating, dysuria and hematuria. Positives and Pertinent negatives as per HPI. Except as noted above in the ROS, all other systems were reviewed and negative. PAST MEDICAL HISTORY     Past Medical History:   Diagnosis Date    Arthritis     Asthma     Atrial fibrillation ([facility] [address] 8/9/2011    Depression     Diverticulitis     DVT     post partum    GERD (gastroesophageal reflux disease)     Hx of blood clots     leg    Hypertension     Insomnia     Other disorders of kidney and ureter     hypothyroid    Pneumonia 11/7/2011    Shingles     Thyroid disease          SURGICAL HISTORY     Past Surgical History:   Procedure Laterality Date    CARDIAC SURGERY  2012    minimaze    CARDIOVERSION  09/2018    CHOLECYSTECTOMY, LAPAROSCOPIC  03/10/2017    with gram          CURRENTMEDICATIONS       Previous Medications    ALBUTEROL SULFATE HFA (VENTOLIN HFA) 108 (90 BASE) MCG/ACT INHALER    Inhale 2 puffs into the lungs 4 times daily as needed for Wheezing    AMITRIPTYLINE (ELAVIL) 25 MG TABLET    Take 1 tablet by mouth nightly    CLONAZEPAM (KLONOPIN) 1 MG TABLET    Take 1 tablet by mouth nightly as needed for Anxiety for up to 90 days.     FUROSEMIDE (LASIX) 40 MG TABLET    TAKE 1 TABLET DAILY    LEVOTHYROXINE (SYNTHROID) 112 MCG TABLET    Take 1 tablet by mouth Daily    LISINOPRIL (PRINIVIL;ZESTRIL) 20 MG TABLET    Take 1 tablet by mouth daily    METOPROLOL TARTRATE (LOPRESSOR) 50 MG TABLET    Take 1 tablet by mouth daily    PANTOPRAZOLE (PROTONIX) 20 MG TABLET    Take 1 tablet by mouth every morning (before breakfast)    SERTRALINE (ZOLOFT) 50 MG TABLET    Take 1 tablet by mouth daily         ALLERGIES     Benadryl [diphenhydramine]    FAMILYHISTORY       Family History   Problem Relation Age of Onset    Lupus Sister     Arthritis Brother     High Blood Pressure Brother     Cancer Brother         prostate    Depression Brother     High Blood Pressure Brother     Diabetes Brother     Lupus Sister           SOCIAL HISTORY       Social History     Tobacco Use    Smoking status: Never Smoker    Smokeless tobacco: Never Used    Tobacco comment: exposed to secondhand smoke   Substance Use Topics    Alcohol use: Yes     Comment: rare    Drug use: No       SCREENINGS    Jonas Coma Scale  Eye Opening: Spontaneous  Best Verbal Response: Oriented  Best Motor Response: Obeys commands  Jonas Coma Scale Score: 15        PHYSICAL EXAM    (up to 7 for level 4, 8 or more for level 5)     ED Triage Vitals [06/27/22 2013]   BP Temp Temp src Heart Rate Resp SpO2 Height Weight   126/83 97.7 °F (36.5 °C) -- 93 14 96 % 5' 6\" (1.676 m) 252 lb (114.3 kg)       Physical Exam  Vitals and nursing note reviewed. Constitutional:       Appearance: She is well-developed. She is not diaphoretic. Comments: Appears to be uncomfortable although in no acute distress   HENT:      Head: Normocephalic and atraumatic. Right Ear: External ear normal.      Left Ear: External ear normal.      Nose: Nose normal.   Eyes:      General:         Right eye: No discharge. Left eye: No discharge. Neck:      Trachea: No tracheal deviation. Cardiovascular:      Rate and Rhythm: Normal rate and regular rhythm. Pulmonary:      Effort: Pulmonary effort is normal. No respiratory distress. Breath sounds: Normal breath sounds. No wheezing or rales. Abdominal:      General: Bowel sounds are normal. There is no distension. Palpations: Abdomen is soft. Tenderness: There is abdominal tenderness. There is guarding. There is no rebound. Comments: Generalized abdominal tenderness. No rebound, but with some guarding. Tenderness noted over the right flank   Musculoskeletal:         General: Normal range of motion. Cervical back: Normal range of motion and neck supple. Skin:     General: Skin is warm and dry. Neurological:      Mental Status: She is alert and oriented to person, place, and time. Psychiatric:         Behavior: Behavior normal.         DIAGNOSTIC RESULTS   LABS:    Labs Reviewed   CBC WITH AUTO DIFFERENTIAL - Abnormal; Notable for the following components:       Result Value    WBC 14.7 (*)     RBC 5.52 (*)     Hematocrit 48.9 (*)     Neutrophils Absolute 12.4 (*)     All other components within normal limits   COMPREHENSIVE METABOLIC PANEL - Abnormal; Notable for the following components:    Glucose 180 (*)     CREATININE 1.3 (*)     GFR Non- 40 (*)     GFR  48 (*)     All other components within normal limits   LIPASE - Abnormal; Notable for the following components:    Lipase 107.0 (*)     All other components within normal limits   LACTATE, SEPSIS - Abnormal; Notable for the following components:    Lactic Acid, Sepsis 2.8 (*)     All other components within normal limits   CULTURE, BLOOD 1   CULTURE, BLOOD 2   TROPONIN   BRAIN NATRIURETIC PEPTIDE   URINALYSIS WITH REFLEX TO CULTURE   LACTATE, SEPSIS       When ordered only abnormal lab results are displayed. All other labs were within normal range or not returned as of this dictation. EKG: When ordered, EKG's are interpreted by the Emergency Department Physician in the absence of a cardiologist.  Please see their note for interpretation of EKG.     RADIOLOGY:   Non-plain film images such as CT, Ultrasound and MRI are read by the radiologist. Plain radiographic images are visualized and preliminarily interpreted by the ED Provider with the below findings:        Interpretation per the Radiologist below, if available at the time of this note:    CT ABDOMEN PELVIS WO CONTRAST Additional Contrast? None   Final Result   1. 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 Associated mesenteric edema and trace abdominopelvic ascites. No pneumatosis   or gross perforation. Findings were discussed with Marlin Costello at 9:58 pm   on 6/27/2022.      2.  Incidental 1.8 cm indeterminate left renal lesion for which nonemergent   follow-up with contrast-enhanced CT or MRI renal mass protocol is recommended. 3.  Morphologic findings suggestive of chronic liver disease. 4.  Additional chronic and benign findings, as above. No results found. PROCEDURES   Unless otherwise noted below, none     Procedures    CRITICAL CARE TIME   Critical Care  There was a high probability of life-threatening clinical deterioration in the patient's condition requiring my urgent intervention. Total critical care time with the patient was 45 minutes excluding separately reportable procedures.   Critical care required due to patients severe sepsis, requiring fluids, broad-spectrum antibiotics, cecal volvulus, requiring general surgery consultation, patient went to [facility] admitted to hospitalist      CONSULTS:  [address] and DIFFERENTIAL DIAGNOSIS/MDM:   Vitals:    Vitals:    06/27/22 2013   BP: 126/83   Pulse: 93   Resp: 14   Temp: 97.7 °F (36.5 °C)   SpO2: 96%   Weight: 252 lb (114.3 kg)   Height: 5' 6\" (1.676 m)       Patient was given the following medications:  Medications   morphine injection 4 mg (4 mg IntraVENous Given 6/27/22 2211)   ondansetron (ZOFRAN) injection 4 mg (4 mg IntraVENous Given 6/27/22 2211)   0.9 % sodium chloride infusion (has no administration in time range)   piperacillin-tazobactam (ZOSYN) 3,375 mg in dextrose 5 % 50 mL IVPB (mini-bag) (has no administration in time range) Is this patient to be included in the SEP-1 Core Measure due to severe sepsis or septic shock? Yes   SEP-1 CORE MEASURE DATA      Sepsis Criteria   Severe Sepsis Criteria   Septic Shock Criteria     Must be confirmed or suspected to move forward with diagnosis of sepsis. Must meet 2:    [] Temperature > 100.9 F (38.3 C)        or < 96.8 F (36 C)  [] HR > 90  [] RR > 20  [] WBC > 12 or < 4 or 10% bands      AND:      [] Infection Confirmed or        Suspected. Must meet 1:    [] Lactate > 2       or   [] Signs of Organ Dysfunction:    - SBP < 90 or MAP < 65  - Altered mental status  - Creatinine > 2 or increased from      baseline  - Urine Output < 0.5 ml/kg/hr  - Bilirubin > 2  - INR > 1.5 (not anticoagulated)  - Platelets < 325,619  - Acute Respiratory Failure as     evidenced by new need for NIPPV     or mechanical ventilation      [] No criteria met for Severe Sepsis. Must meet 1:    [] Lactate > 4        or   [] SBP < 90 or MAP < 65 for at        least two readings in the first        hour after fluid bolus        administration      [] Vasopressors initiated (if hypotension persists after fluid resuscitation)        [] No criteria met for Septic Shock. Patient Vitals for the past 6 hrs:   BP Temp Pulse Resp SpO2 Height Weight Percent Weight Change   06/27/22 2013 126/83 97.7 °F (36.5 °C) 93 14 96 % 5' 6\" (1.676 m) 252 lb (114.3 kg) 0      Recent Labs     06/27/22 2057   WBC 14.7*   CREATININE 1.3*   BILITOT 0.5            Time Severe Sepsis Identified: 2215    Fluid Resuscitation Rational: at least 30mL/kg based on ideal body weight due to obesity defined as BMI >30 (patient's BMI is Body mass index is 40.67 kg/m². and IBW is Ideal body weight: 59.3 kg (130 lb 11.7 oz)Adjusted ideal body weight: 81.3 kg (179 lb 3.8 oz))    Repeat lactate level: ordered and pending at this time    Reassessment Exam:   Not applicable. Patient does not have septic shock.     Briefly, this is a 78-year-old female who presents to the emergency department today for abdominal pain. The patient states that when she woke up this morning she was not feeling well. She reports generalized abdominal pain with radiation to both flank, associated nausea and vomiting. On examination, she appears to be uncomfortable although is in no acute distress. She does have generalized abdominal tenderness with some guarding, and she does have tenderness noted about the right flank. EKG will be documented by my attending, please see his note for further details. CBC shows leukocytosis of 14.7 there is no evidence of anemia. CMP does show mild dehydration with creatinine 1.3. Lipase is elevated at 107 however she has no transaminitis. Troponin is negative. Lactic acid is 2.8. CT obtained which shows a cecal volvulus with a distended cecum measuring up to 10 cm. There is associated mesenteric edema and trace abdominal pelvic ascites. There is no perforation. Will cover with Zosyn. Discussed with general surgery, plan to take to the OR tonight. Patient will otherwise be admitted to the hospitalist, patient is updated, agreeable with plan, stable for admission    FINAL IMPRESSION      1. Cecal volvulus ([address]    2. Septicemia [facility])          DISPOSITION/PLAN   DISPOSITION Decision To Admit 06/27/2022 10:07:52 PM      PATIENT REFERRED TO:  No follow-up provider specified.     DISCHARGE MEDICATIONS:  New Prescriptions    No medications on file       DISCONTINUED MEDICATIONS:  Discontinued Medications    No medications on file              (Please note that portions of this note were completed with a voice recognition program.  Efforts were made to edit the dictations but occasionally words are mis-transcribed.)    Meli Smiley PA-C (electronically signed)            Meli Smiley PA-C  06/27/22 [id number]

## 2022-06-28 NOTE — CONSULTS
[facility] GENERAL AND LAPAROSCOPIC SURGERY                       PATIENT NAME: Deisi Murphy     ADMISSION DATE: 6/27/2022  8:10 PM      TODAY'S DATE: 6/27/2022    Reason for Consult:  Abdominal pain    Requesting Physician:  RUSSELL Tellez    HISTORY OF PRESENT ILLNESS:              The patient is a 65 y.o. female who presents with abd pain, diffuse, sharp, severe throughout abd and flank regions. Has had for a day. Has had N/V. Nonbloody. Has had a prior liza, no prior intestinal surgery. More with pressure, and moving. Past Medical History:        Diagnosis Date    Arthritis     Asthma     Atrial fibrillation ([facility] [address] 8/9/2011    Depression     Diverticulitis     DVT     post partum    GERD (gastroesophageal reflux disease)     Hx of blood clots     leg    Hypertension     Insomnia     Other disorders of kidney and ureter     hypothyroid    Pneumonia 11/7/2011    Shingles     Thyroid disease        Past Surgical History:        Procedure Laterality Date    CARDIAC SURGERY  2012    minimaze    CARDIOVERSION  09/2018    CHOLECYSTECTOMY, LAPAROSCOPIC  03/10/2017    with gram        Current Medications:   Current Facility-Administered Medications: morphine injection 4 mg, 4 mg, IntraVENous, Q30 Min PRN  ondansetron (ZOFRAN) injection 4 mg, 4 mg, IntraVENous, Q6H PRN  piperacillin-tazobactam (ZOSYN) 3,375 mg in dextrose 5 % 50 mL IVPB (mini-bag), 3,375 mg, IntraVENous, Once  Prior to Admission medications    Medication Sig Start Date End Date Taking?  Authorizing Provider   sertraline (ZOLOFT) 50 MG tablet Take 1 tablet by mouth daily 9/28/21   CARLIE Reynaga CNP   lisinopril (PRINIVIL;ZESTRIL) 20 MG tablet Take 1 tablet by mouth daily 9/28/21   CARLIE Reynaga CNP   metoprolol tartrate (LOPRESSOR) 50 MG tablet Take 1 tablet by mouth daily 9/28/21   CARLIE Reynaga CNP   furosemide (LASIX) 40 MG tablet TAKE 1 TABLET DAILY 9/28/21   CARLIE Reynaga CNP amitriptyline (ELAVIL) 25 MG tablet Take 1 tablet by mouth nightly 9/28/21   CARLIE Reynaga CNP   levothyroxine (SYNTHROID) 112 MCG tablet Take 1 tablet by mouth Daily 9/28/21   CARLIE Reynaga CNP   pantoprazole (PROTONIX) 20 MG tablet Take 1 tablet by mouth every morning (before breakfast) 9/28/21   CARLIE Reynaga CNP   albuterol sulfate HFA (VENTOLIN HFA) 108 (90 Base) MCG/ACT inhaler Inhale 2 puffs into the lungs 4 times daily as needed for Wheezing 9/28/21   CARLIE Reynaga CNP   clonazePAM (KLONOPIN) 1 MG tablet Take 1 tablet by mouth nightly as needed for Anxiety for up to 90 days. 9/28/21 12/27/21  CARLIE Reynaga CNP        Allergies:  Benadryl [diphenhydramine]    Social History:    reports that she has never smoked. She has never used smokeless tobacco. She reports current alcohol use. She reports that she does not use drugs.     Family History:        Problem Relation Age of Onset    Lupus Sister     Arthritis Brother     High Blood Pressure Brother     Cancer Brother         prostate    Depression Brother     High Blood Pressure Brother     Diabetes Brother     Lupus Sister        REVIEW OF SYSTEMS:  CONSTITUTIONAL:  positive for  fatigue and malaise  HEENT:  negative  RESPIRATORY:  negative  CARDIOVASCULAR:  negative  GASTROINTESTINAL:  negative except for nausea, vomiting, abdominal pain and abdominal distention  GENITOURINARY:  negative  HEMATOLOGIC/LYMPHATIC:  negative  NEUROLOGICAL:  negative  SKIN: negative    PHYSICAL EXAM:  VITALS:  /83   Pulse 93   Temp 97.7 °F (36.5 °C)   Resp 14   Ht 5' 6\" (1.676 m)   Wt 252 lb (114.3 kg)   SpO2 96%   BMI 40.67 kg/m²   24HR INTAKE/OUTPUT:  No intake or output data in the 24 hours ending 06/27/22 2311  DRAIN/TUBE OUTPUT:     CONSTITUTIONAL:  alert, mild distress and morbidly obese  EYES:  sclera clear  ENT:  normocepalic, without obvious abnormality  NECK:  supple, symmetrical, trachea midline and no carotid bruits  LUNGS:  clear to auscultation  CARDIOVASCULAR:  regular rate and rhythm   ABDOMEN:  scars noted are healed, hypoactive bowel sounds, firm, distended, tenderness noted diffusely, voluntary guarding absent, no masses palpated, no hepatosplenomegally and hernia absent  MUSCULOSKELETAL:  0+ pitting edema lower extremities  NEUROLOGIC:  Mental Status Exam:  Level of Alertness:   awake  Orientation:   person, place, time  SKIN:  no bruising or bleeding, normal skin color, texture, turgor and no redness, warmth, or swelling    DATA:    CBC:   Recent Labs     06/27/22 2057   WBC 14.7*   HGB 15.7   HCT 48.9*        BMP:    Recent Labs     06/27/22 2057      K 4.9   CL 99   CO2 24   BUN 15   CREATININE 1.3*   GLUCOSE 180*     Hepatic:   Recent Labs     06/27/22 2057   AST 33   ALT 38   BILITOT 0.5   ALKPHOS 81     Mag:    No results for input(s): MG in the last 72 hours. Phos:   No results for input(s): PHOS in the last 72 hours. INR: No results for input(s): INR in the last 72 hours. LIPASE:   Recent Labs     06/27/22 2057   LIPASE 107.0*      Lactate 2.8    AMYLASE: No results for input(s): AMYLASE in the last 72 hours. Radiology Review:      CT ABDOMEN PELVIS WO CONTRAST Additional Contrast? None    Result Date: 6/27/2022  EXAMINATION: CT OF THE ABDOMEN AND PELVIS WITHOUT CONTRAST 6/27/2022 8:43 pm TECHNIQUE: CT of the abdomen and pelvis was performed without the administration of intravenous contrast. Multiplanar reformatted images are provided for review. Automated exposure control, iterative reconstruction, and/or weight based adjustment of the mA/kV was utilized to reduce the radiation dose to as low as reasonably achievable. COMPARISON: None.  HISTORY: ORDERING SYSTEM PROVIDED HISTORY: abd pain n/v TECHNOLOGIST PROVIDED HISTORY: Reason for exam:->abd pain n/v Additional Contrast?->None Decision Support Exception - unselect if not a suspected or confirmed emergency medical condition->Emergency Medical Condition (MA) Reason for Exam: abd pain n/v FINDINGS: Lower Chest: Scarring in the anterior right middle lobe and lingula. No acute findings. Small hiatal hernia. Organs: Evaluation of the abdominal and pelvic viscera is limited in the absence of contrast.  Subtle lobulated liver contour and widening of the fissural spaces suggestive of chronic liver disease. Cholecystectomy. The pancreas, spleen, adrenals and kidneys reveal no acute findings. There is an indeterminate 1.8 cm lesion arising from the posterosuperior pole the left kidney. GI/Bowel: Findings compatible cecal volvulus, with the dilated cecum located in the left upper quadrant measuring up to 10 cm in greatest dimension. Associated twisting of the mesentery and transition point on axial image 155. No small bowel distension. Mesenteric edema is noted. No pneumatosis or gross perforation identified. Distal colonic diverticulosis. Pelvis: No acute findings. Peritoneum/Retroperitoneum: Trace abdominopelvic ascites. No free air. No lymphadenopathy. Bones/Soft Tissues: No acute osseous abnormality identified. Multilevel degenerative disc disease and facet arthropathy with degenerative grade 1 anterolisthesis of L4.     1.  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 Associated mesenteric edema and trace abdominopelvic ascites. No pneumatosis or gross perforation. Findings were discussed with Lemuel Brittle at 9:58 pm on 6/27/2022. 2.  Incidental 1.8 cm indeterminate left renal lesion for which nonemergent follow-up with contrast-enhanced CT or MRI renal mass protocol is recommended. 3.  Morphologic findings suggestive of chronic liver disease. 4.  Additional chronic and benign findings, as above.        IMPRESSION/RECOMMENDATIONS:    Cecal Volvulus    Plan for ex lap, detorsion, possible resection  Antibiotics and fluids ordered  Hemal Quiroz of problem, treatment, R/B/A reviewed with pt,

## 2022-06-28 NOTE — PROGRESS NOTES
[facility]    6/28/2022 2:28 PM        Name: Rosamaria Calix . Admitted: 6/27/2022  Primary Care Provider: CARLIE Barber CNP (Tel: [phone])                        Subjective:  . No acute events overnight. Resting well. Pain control. Diet ok.    Labs reviewed      Reviewed interval ancillary notes    Current Medications  piperacillin-tazobactam (ZOSYN) 3,375 mg in dextrose 5 % 50 mL IVPB (mini-bag), Q8H  metoprolol (LOPRESSOR) injection 2.5 mg, Q6H  pantoprazole (PROTONIX) injection 40 mg, Daily  sodium chloride flush 0.9 % injection 5-40 mL, 2 times per day  sodium chloride flush 0.9 % injection 5-40 mL, PRN  0.9 % sodium chloride infusion, PRN  ondansetron (ZOFRAN-ODT) disintegrating tablet 4 mg, Q8H PRN   Or  ondansetron (ZOFRAN) injection 4 mg, Q6H PRN  polyethylene glycol (GLYCOLAX) packet 17 g, Daily PRN  acetaminophen (TYLENOL) tablet 650 mg, Q6H PRN   Or  acetaminophen (TYLENOL) suppository 650 mg, Q6H PRN  [START ON 6/29/2022] enoxaparin (LOVENOX) injection 40 mg, Nightly  acetaminophen (TYLENOL) tablet 1,000 mg, 3 times per day  oxyCODONE (ROXICODONE) immediate release tablet 5 mg, Q6H PRN  morphine (PF) injection 2 mg, Q2H PRN   Or  morphine injection 4 mg, Q2H PRN  metoclopramide (REGLAN) injection 10 mg, Q6H  ketorolac (TORADOL) injection 15 mg, Q6H PRN  dextrose 5 % and 0.45 % sodium chloride infusion, Continuous        Objective:  BP (!) 95/38   Pulse 75   Temp 97.1 °F (36.2 °C) (Temporal)   Resp 13   Ht 5' 6\" (1.676 m)   Wt 252 lb 10.4 oz (114.6 kg)   SpO2 96%   BMI 40.78 kg/m²     Intake/Output Summary (Last 24 hours) at 6/28/2022 1428  Last data filed at 6/28/2022 1051  Gross per 24 hour   Intake 2047.39 ml   Output 300 ml   Net 1747.39 ml      Wt Readings from Last 3 Encounters:   06/28/22 252 lb 10.4 oz (114.6 kg) 03/24/22 254 lb 12.8 oz (115.6 kg)   09/28/21 250 lb (113.4 kg)       General appearance:  Appears comfortable  Eyes: Sclera clear. Pupils equal.  ENT: Moist oral mucosa. Trachea midline, no adenopathy. Cardiovascular: Regular rhythm, normal S1, S2. No murmur. No edema in lower extremities  Respiratory: Not using accessory muscles. Good inspiratory effort. Clear to auscultation bilaterally, no wheeze or crackles. GI: Abdomen soft, no tenderness, not distended, normal bowel sounds  Musculoskeletal: No cyanosis in digits, neck supple  Neurology: CN 2-12 grossly intact. No speech or motor deficits  Psych: Normal affect. Alert and oriented in time, place and person  Skin: Warm, dry, normal turgor    Labs and Tests:  CBC:   Recent Labs     06/27/22 2057 06/28/22  0600   WBC 14.7* 12.8*   HGB 15.7 12.9    195     BMP:    Recent Labs     06/27/22 2057 06/28/22  0600 06/28/22  1240    137  --    K 4.9 5.5* 5.2*   CL 99 103  --    CO2 24 25  --    BUN 15 15  --    CREATININE 1.3* 1.0  --    GLUCOSE 180* 198*  --      Hepatic:   Recent Labs     06/27/22 2057   AST 33   ALT 38   BILITOT 0.5   ALKPHOS 81       Discussed care with patient             Problem List  Principal Problem:    Cecal volvulus ([address]  Resolved Problems:    * No resolved hospital problems. *       Assessment & Plan:   1.  Cecal volvulus  -Patient is status post emergent surgery  -We will continue postop care per general surgery    Hypertension  -Monitor BP for now      Acute kidney injury resolved    Hyperkalemia K is 5.2 we will monitor closely      Diet: Diet NPO Exceptions are: Ice Chips, Sips of Water with Meds, Popsicles  Code:Full Code  DVT PPX lovenox       Kp Alvarado MD   6/28/2022 2:28 PM

## 2022-06-28 NOTE — CARE COORDINATION
Discharge Planning Assessment  Discharge Planning Assessment  RN/SW discharge planner met with patient/ (and family member) to discuss reason for admission, current living situation, and potential needs at the time of discharge    Demographics/Insurance verified Yes    Current type of dwelling:single story home with one step to enter     Patient from ECF/SW confirmed with:n/a     Living arrangements:pt states lives alone but has friends and family in the area     Level of function/Support:Independent, pt still drives and drove self here to hospital     PCP:Bernardo jarvis     Last Visit to PCP:    DME:none     Active with any community resources/agencies/skilled home care:none     Medication compliance issues:Independent     Financial issues that could impact healthcare:none         Tentative discharge plan:probable home. Pt has NG at this time and GINGER dressing to surgical site     Discussed and provided facilities of choice if transition to a skilled nursing facility is required at the time of discharge therapy pending at this time     Discussed with patient and/or family that on the day of discharge home tentative time of discharge will be between 10 AM and noon. Transportation at the time of discharge:pt states has her car here but if cannot drive can have someone pick her up.     Kiraa Kee RN, BSN  [phone]

## 2022-06-28 NOTE — ANESTHESIA PRE PROCEDURE
Department of Anesthesiology  Preprocedure Note       Name:  Ambika Kowalski   Age:  65 y.o.  :  1945                                          MRN:  [id number]         Date:  2022      Surgeon: Sam Ingram):  Jerline Ahumada, MD    Procedure: Procedure(s):  EXPLORATORY LAPAROTOMY    Medications prior to admission:   Prior to Admission medications    Medication Sig Start Date End Date Taking? Authorizing Provider   sertraline (ZOLOFT) 50 MG tablet Take 1 tablet by mouth daily 21   Hardeep Guerrero, APRN - CNP   lisinopril (PRINIVIL;ZESTRIL) 20 MG tablet Take 1 tablet by mouth daily 21   [facility] Cesar, CARLIE Carvajal CNP   metoprolol tartrate (LOPRESSOR) 50 MG tablet Take 1 tablet by mouth daily 21   Hollie Cesar, APRN - CNP   furosemide (LASIX) 40 MG tablet TAKE 1 TABLET DAILY 21   [facility] Cesar, CARLIE Carvajal CNP   amitriptyline (ELAVIL) 25 MG tablet Take 1 tablet by mouth nightly 21   Hollie Cesar, CARLIE Carvajal CNP   levothyroxine (SYNTHROID) 112 MCG tablet Take 1 tablet by mouth Daily 21   [facility] CARLIE Carvajal CNP   pantoprazole (PROTONIX) 20 MG tablet Take 1 tablet by mouth every morning (before breakfast) 21   CARLIE Bravo CNP   albuterol sulfate HFA (VENTOLIN HFA) 108 (90 Base) MCG/ACT inhaler Inhale 2 puffs into the lungs 4 times daily as needed for Wheezing 21   CARLIE Bravo CNP   clonazePAM (KLONOPIN) 1 MG tablet Take 1 tablet by mouth nightly as needed for Anxiety for up to 90 days.  21  CARLIE Bravo CNP       Current medications:    Current Facility-Administered Medications   Medication Dose Route Frequency Provider Last Rate Last Admin    morphine injection 4 mg  4 mg IntraVENous Q30 Min PRN Milas Fairly, PA-C   4 mg at 22    ondansetron (ZOFRAN) injection 4 mg  4 mg IntraVENous Q6H PRN Milas Fairly, PA-C   4 mg at 22     Current Outpatient Medications   Medication Sig Dispense Refill    sertraline (ZOLOFT) 50 MG tablet Take 1 tablet by mouth daily 90 tablet 3    lisinopril (PRINIVIL;ZESTRIL) 20 MG tablet Take 1 tablet by mouth daily 90 tablet 3    metoprolol tartrate (LOPRESSOR) 50 MG tablet Take 1 tablet by mouth daily 90 tablet 3    furosemide (LASIX) 40 MG tablet TAKE 1 TABLET DAILY 90 tablet 3    amitriptyline (ELAVIL) 25 MG tablet Take 1 tablet by mouth nightly 90 tablet 3    levothyroxine (SYNTHROID) 112 MCG tablet Take 1 tablet by mouth Daily 90 tablet 3    pantoprazole (PROTONIX) 20 MG tablet Take 1 tablet by mouth every morning (before breakfast) 90 tablet 3    albuterol sulfate HFA (VENTOLIN HFA) 108 (90 Base) MCG/ACT inhaler Inhale 2 puffs into the lungs 4 times daily as needed for Wheezing 3 each 3    clonazePAM (KLONOPIN) 1 MG tablet Take 1 tablet by mouth nightly as needed for Anxiety for up to 90 days. 30 tablet 0       Allergies: Allergies   Allergen Reactions    Benadryl [Diphenhydramine]      Accelerated heart rate       Problem List:    Patient Active Problem List   Diagnosis Code    Acquired hypothyroidism E03.9    GERD (gastroesophageal reflux disease) K21.9    Diverticulosis K57.90    Depression F32. A    Atrial fibrillation (HCC) I48.91    Knee osteoarthritis M17.10    Cholecystitis K81.9    Acute calculous cholecystitis K80.00    Episodic cluster headache, not intractable G44.019    SOB (shortness of breath) R06.02    Obesity (BMI 30-39. 9) E66.9    Dilated cardiomyopathy (HCC) I42.0    Herpes zoster without complication P59.5    Benign essential HTN I10    Severe single current episode of major depressive disorder, without psychotic features ([facility] F32.2    Chronic anxiety F41.9    Chronic congestive heart failure (HCC) I50.9    Chronic combined systolic (congestive) and diastolic (congestive) heart failure (HCC) I50.42    PAF (paroxysmal atrial fibrillation) (HCC) I48.0    Atypical atrial flutter (HCC) I48.4    Typical atrial flutter ([facility] I48.3    Morbid obesity due to excess calories (HCC) E66.01    Cecal volvulus ([facility]) K56.2       Past Medical History:        Diagnosis Date    Arthritis     Asthma     Atrial fibrillation ([facility] 8/9/2011    Depression     Diverticulitis     DVT     post partum    GERD (gastroesophageal reflux disease)     Hx of blood clots     leg    Hypertension     Insomnia     Other disorders of kidney and ureter     hypothyroid    Pneumonia 11/7/2011    Shingles     Thyroid disease        Past Surgical History:        Procedure Laterality Date    CARDIAC SURGERY  2012    minimaze    CARDIOVERSION  09/2018    CHOLECYSTECTOMY, LAPAROSCOPIC  03/10/2017    with gram        Social History:    Social History     Tobacco Use    Smoking status: Never Smoker    Smokeless tobacco: Never Used    Tobacco comment: exposed to secondhand smoke   Substance Use Topics    Alcohol use: Yes     Comment: rare                                Counseling given: Not Answered  Comment: exposed to secondhand smoke      Vital Signs (Current):   Vitals:    06/27/22 2013 06/27/22 2246   BP: 126/83 132/75   Pulse: 93 69   Resp: 14 15   Temp: 97.7 °F (36.5 °C)    SpO2: 96% 97%   Weight: 252 lb (114.3 kg)    Height: 5' 6\" (1.676 m)                                               BP Readings from Last 3 Encounters:   06/27/22 132/75   03/24/22 115/80   09/28/21 138/76       NPO Status:                                                                                 BMI:   Wt Readings from Last 3 Encounters:   06/27/22 252 lb (114.3 kg)   03/24/22 254 lb 12.8 oz (115.6 kg)   09/28/21 250 lb (113.4 kg)     Body mass index is 40.67 kg/m².     CBC:   Lab Results   Component Value Date    WBC 14.7 06/27/2022    RBC 5.52 06/27/2022    HGB 15.7 06/27/2022    HCT 48.9 06/27/2022    MCV 88.6 06/27/2022    RDW 14.0 06/27/2022     06/27/2022       CMP:   Lab Results   Component Value Date     06/27/2022    K 4.9 06/27/2022    K 4.2 03/28/2018    CL 99 06/27/2022    CO2 24 06/27/2022    BUN 15 06/27/2022    CREATININE 1.3 06/27/2022    GFRAA 48 06/27/2022    GFRAA 64 11/09/2011    AGRATIO 1.4 06/27/2022    LABGLOM 40 06/27/2022    GLUCOSE 180 06/27/2022    PROT 7.8 06/27/2022    PROT 7.0 11/07/2011    CALCIUM 10.6 06/27/2022    BILITOT 0.5 06/27/2022    ALKPHOS 81 06/27/2022    AST 33 06/27/2022    ALT 38 06/27/2022       POC Tests: No results for input(s): POCGLU, POCNA, POCK, POCCL, POCBUN, POCHEMO, POCHCT in the last 72 hours. Coags:   Lab Results   Component Value Date    PROTIME 11.8 02/18/2019    INR 1.04 02/18/2019    APTT 37.2 03/26/2018       HCG (If Applicable): No results found for: PREGTESTUR, PREGSERUM, HCG, HCGQUANT     ABGs: No results found for: PHART, PO2ART, APQ7IDX, DIA5CCC, BEART, Z8FAUKFP     Type & Screen (If Applicable):  No results found for: LABABO, LABRH    Drug/Infectious Status (If Applicable):  No results found for: HIV, HEPCAB    COVID-19 Screening (If Applicable): No results found for: COVID19        Anesthesia Evaluation  Patient summary reviewed and Nursing notes reviewed no history of anesthetic complications:   Airway: Mallampati: II  TM distance: >3 FB   Neck ROM: full  Mouth opening: > = 3 FB   Dental:    (+) upper dentures and lower dentures      Pulmonary:   (+) pneumonia:  asthma (no recent exacerbations):     (-) rhonchi                           Cardiovascular:    (+) hypertension:, dysrhythmias: atrial fibrillation, CHF:,           Rate: normal                    Neuro/Psych:   (+) headaches:, psychiatric history:            GI/Hepatic/Renal:   (+) GERD:,          ROS comment: Cecal volvulous. Endo/Other:    (+) hypothyroidism: arthritis:., .                 Abdominal:   (+) obese,           Vascular: Other Findings:           Anesthesia Plan      general     ASA 3 - emergent       Induction: intravenous and rapid sequence.     MIPS: Postoperative opioids intended and Prophylactic antiemetics administered. Anesthetic plan and risks discussed with patient. Use of blood products discussed with patient whom consented to blood products.                      Sherri Alvarado MD   6/27/2022

## 2022-06-28 NOTE — H&P
Hospital Medicine History & Physical      PCP: Nova Araiza, APRN - CNP    Date of Admission: 6/27/2022    Date of Service: Pt seen/examined on 6/27/2022 and Admitted to Inpatient with expected LOS greater than two midnights due to medical therapy. Chief Complaint: Abdominal pain      History Of Present Illness:    74-year-old female with history of cholecystectomy presents with abdominal pain and flank pain. Abdominal pain is epigastric, radiates to both flanks, associate with nausea and several episodes vomiting. Denies hematemesis. Reported her pain is 10 out of intensity. In the emergency room afebrile, heart rate 70s, CT imaging showed cecal volvulus with distended cecum measuring up to 10 cm with associated mesenteric edema and trace abdominal pelvic ascites. General surgery is consulted and emergently taken to the OR. Internal medicine was consulted for admission      Past Medical History:          Diagnosis Date    Arthritis     Asthma     Atrial fibrillation ([facility] [address] 8/9/2011    Depression     Diverticulitis     DVT     post partum    GERD (gastroesophageal reflux disease)     Hx of blood clots     leg    Hypertension     Insomnia     Other disorders of kidney and ureter     hypothyroid    Pneumonia 11/7/2011    Shingles     Thyroid disease        Past Surgical History:          Procedure Laterality Date    CARDIAC SURGERY  2012    minimaze    CARDIOVERSION  09/2018    CHOLECYSTECTOMY, LAPAROSCOPIC  03/10/2017    with gram     HEMICOLECTOMY N/A 6/27/2022    EXPLORATORY LAPAROTOMY, RIGHT HEMICOLECTOMY performed by Shalonda Espitia MD at [address] [place] [address]       Medications Prior to Admission:      Prior to Admission medications    Medication Sig Start Date End Date Taking?  Authorizing Provider   sertraline (ZOLOFT) 50 MG tablet Take 1 tablet by mouth daily  Patient taking differently: Take 25 mg by mouth daily  9/28/21   Nova Araiza, APRN - SVITLANA   lisinopril (PRINIVIL;ZESTRIL) 20 MG tablet Take 1 tablet by mouth daily  Patient taking differently: Take 10 mg by mouth daily  9/28/21   CARLIE Booth CNP   metoprolol tartrate (LOPRESSOR) 50 MG tablet Take 1 tablet by mouth daily  Patient taking differently: Take 25 mg by mouth daily  9/28/21   CARLIE Booth CNP   furosemide (LASIX) 40 MG tablet TAKE 1 TABLET DAILY 9/28/21   CARLIE Booth CNP   amitriptyline (ELAVIL) 25 MG tablet Take 1 tablet by mouth nightly 9/28/21   CARLIE Booth CNP   levothyroxine (SYNTHROID) 112 MCG tablet Take 1 tablet by mouth Daily 9/28/21   CARLIE Booth CNP   pantoprazole (PROTONIX) 20 MG tablet Take 1 tablet by mouth every morning (before breakfast)  Patient not taking: Reported on 6/28/2022 9/28/21   CARLIE Booth CNP   albuterol sulfate HFA (VENTOLIN HFA) 108 (90 Base) MCG/ACT inhaler Inhale 2 puffs into the lungs 4 times daily as needed for Wheezing 9/28/21   CARLIE Booth CNP   clonazePAM (KLONOPIN) 1 MG tablet Take 1 tablet by mouth nightly as needed for Anxiety for up to 90 days. 9/28/21 12/27/21  CARLIE Booth CNP       Allergies:  Benadryl [diphenhydramine]    Social History:      The patient currently lives at home    TOBACCO:   reports that she has never smoked. She has never used smokeless tobacco.  ETOH:   reports current alcohol use. Family History:      Reviewed chart        Problem Relation Age of Onset    Lupus Sister     Arthritis Brother     High Blood Pressure Brother     Cancer Brother         prostate    Depression Brother     High Blood Pressure Brother     Diabetes Brother     Lupus Sister        REVIEW OF SYSTEMS:   Pertinent positives as noted in the HPI. All other systems reviewed and negative.     PHYSICAL EXAM PERFORMED:    BP (!) 102/51   Pulse 77   Temp 96.8 °F (36 °C) (Temporal)   Resp 12   Ht 5' 6\" (1.676 m)   Wt 252 lb (114.3 kg)   SpO2 93%   BMI 40.67 kg/m² General appearance:  No apparent distress, appears stated age and cooperative. HEENT:  Normal cephalic, atraumatic without obvious deformity. Pupils equal, round, and reactive to light. Extra ocular muscles intact. Conjunctivae/corneas clear. Neck: Supple, with full range of motion. No jugular venous distention. Trachea midline. Respiratory:  Normal respiratory effort. Clear to auscultation, bilaterally without Rales/Wheezes/Rhonchi. Cardiovascular:  Regular rate and rhythm with normal S1/S2 without murmurs, rubs or gallops. Abdomen: Surgical site clean dry intact, wound VAC in place  Musculoskeletal:  No clubbing, cyanosis or edema bilaterally. Full range of motion without deformity. Skin: Skin color, texture, turgor normal.  No rashes or lesions. Neurologic:  Neurovascularly intact without any focal sensory/motor deficits. Cranial nerves: II-XII intact, grossly non-focal.  Psychiatric:  Alert and oriented, thought content appropriate, normal insight  Capillary Refill: Brisk,< 3 seconds   Peripheral Pulses: +2 palpable, equal bilaterally       Labs:     Recent Labs     06/27/22 2057   WBC 14.7*   HGB 15.7   HCT 48.9*        Recent Labs     06/27/22 2057      K 4.9   CL 99   CO2 24   BUN 15   CREATININE 1.3*   CALCIUM 10.6     Recent Labs     06/27/22 2057   AST 33   ALT 38   BILITOT 0.5   ALKPHOS 81     No results for input(s): INR in the last 72 hours.   Recent Labs     06/27/22 2057   TROPONINI <0.01       Urinalysis:      Lab Results   Component Value Date    NITRU Negative 09/28/2021    WBCUA 5 09/28/2021    BACTERIA RARE 03/27/2018    RBCUA 1 09/28/2021    BLOODU Trace 03/24/2022    BLOODU TRACE 09/28/2021    SPECGRAV 1.015 03/24/2022    SPECGRAV 1.007 09/28/2021    GLUCOSEU Neg 03/24/2022    GLUCOSEU Negative 09/28/2021    GLUCOSEU NEGATIVE 11/07/2011       Radiology:     CXR: I have reviewed the CXR with the following interpretation: n/a  EKG:  I have reviewed the EKG with the following interpretation: n/a    CT ABDOMEN PELVIS WO CONTRAST Additional Contrast? None   Final Result   1. 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 Associated mesenteric edema and trace abdominopelvic ascites. No pneumatosis   or gross perforation. Findings were discussed with Atiya Weiss at 9:58 pm   on 6/27/2022.      2.  Incidental 1.8 cm indeterminate left renal lesion for which nonemergent   follow-up with contrast-enhanced CT or MRI renal mass protocol is recommended. 3.  Morphologic findings suggestive of chronic liver disease. 4.  Additional chronic and benign findings, as above. ASSESSMENT/PLAN:    Active Hospital Problems    Diagnosis Date Noted    Cecal volvulus ([facility] [address] [K56.2] 06/27/2022     Priority: Medium     Cecal volvulus, continue IV fluids, continue Zosyn, general surgery following, emergent surgical intervention planned  Continue IV fluids  Pain control with morphine  Antiemetics  Hypertension, home blood pressure medications will be avoided including Lasix. Metoprolol 2.5 mg every 6 hours with hold parameters have been started  GERD, continue PPI      DVT Prophylaxis: Lovenox  Diet: Diet NPO Exceptions are: Ice Chips, Sips of Water with Meds, Popsicles  Code Status: Full Code    PT/OT Eval Status: While inpatient, order if needed    [facility] as inpatient. I anticipate hospitalization spanning more than two midnights for investigation and treatment of the above medically necessary diagnoses. Sandra Maharaj MD    Thank you CARLIE Thomas CNP for the opportunity to be involved in this patient's care. If you have any questions or concerns please feel free to contact me at [phone].

## 2022-06-28 NOTE — PROGRESS NOTES
Patient to PACU from OR. Awakens to voice. VSS. Abd soft, surgical dressing (GINGER) intact, small amount of drainage. Robertson in place. Will monitor.

## 2022-06-28 NOTE — PROGRESS NOTES
Patient admitted to CVU from PACU and attached to CVU monitors. Report received from PACU RN. Hemodynamics stable and will continue to monitor. Abdominal incision with GINGER dressing in place. Scant amount of drainage noted. Robertson in place. Patient oriented to room and call light within reach.  Will continue to monitor

## 2022-06-28 NOTE — ANESTHESIA POSTPROCEDURE EVALUATION
Department of Anesthesiology  Postprocedure Note    Patient: Marly Sterling  MRN: [id number]  YOB: 1945  Date of evaluation: 6/28/2022      Procedure Summary     Date: 06/27/22 Room / Location: [address]    Anesthesia Start: 2350 Anesthesia Stop: 06/28/22 0136    Procedure: EXPLORATORY LAPAROTOMY, RIGHT HEMICOLECTOMY (N/A Abdomen) Diagnosis:       Volvulus ([address]      (Volvulus ([address] [K56.2])    Surgeons: Piyush Rodney MD Responsible Provider: Peggy Hopkins MD    Anesthesia Type: general ASA Status: 3 - Emergent          Anesthesia Type: No value filed.     Darshan Phase I: Darshan Score: 8    Darshan Phase II:        Anesthesia Post Evaluation    Patient location during evaluation: PACU  Patient participation: complete - patient participated  Level of consciousness: awake  Airway patency: patent  Nausea & Vomiting: no vomiting  Complications: no  Cardiovascular status: hemodynamically stable  Respiratory status: acceptable  Hydration status: euvolemic  Multimodal analgesia pain management approach

## 2022-06-28 NOTE — OP NOTE
[facility]                     [address] [facility], [address] [facility], [place]                                OPERATIVE REPORT    PATIENT NAME: Christina Jarvis                   :        1945  MED REC NO:   [id number]                          ROOM:       2910  ACCOUNT NO:   [de-identified]                           ADMIT DATE: 2022  PROVIDER:     yAanna Mckenna MD    DATE OF PROCEDURE:  2022    PREOPERATIVE DIAGNOSIS:  Cecal volvulus. POSTOPERATIVE DIAGNOSIS:  Cecal volvulus. PROCEDURE:  Exploratory laparotomy, detorsion of volvulus, right  hemicolectomy with primary ileocolonic anastomosis. SURGEON:  Ayanna Mckenna MD    ANESTHESIA:  General plus local.    ESTIMATED BLOOD LOSS:  Minimal.    COMPLICATIONS:  None. INDICATIONS:  The patient presents acutely to the emergency room with  severe abdominal pain, nausea, vomiting, and workup reveals cecal  volvulus. The patient is resuscitating, given fluids, antibiotics, pain  medicine, and brought to the operating room for exploration. FINDINGS:  In terms of findings at surgery, the patient had the cecum  torsed and migrated up into the left upper quadrant. It was massively  distended. The small bowel tethered to its proximal and the right colon  appeared ischemic; however, there was no perforation present. Viability  of the bowel did return with correction of the volvulus and  straightening out the mesentery. The area of the cecum and right colon  was quite patulous, however, and with poor tone and possible ischemia. I went ahead and performed a right hemicolectomy for the patient. OPERATIVE PROCEDURE:  The patient was brought to the operating room,  placed on the operative table in supine position. Compression boots  were placed. General anesthesia was administered. The abdomen was  prepped and draped sterilely and time-out was done. Vertical midline  incision was made.   Dissection was carried down through the skin,  subcutaneous tissue, fascia, and peritoneum intra-abdominally. The  bowel was noted to be volvulized as above and with gentle progressive  retraction, we were able to untorse the bowel, elevate the bowel, and  externalize the cecum, right colon, and the distal small bowel from the  abdominal cavity. We then ran the small bowel retrograde and with  detorsing the bowel made sure that the entire small bowel was free and  clear of continued twisting of the mesentery. We did lay it out  properly. Following this, we were able to then mobilize the transverse  colon, hepatic flexure, and the right colon using LigaSure bringing this  up into field of view and at this point in time, the bowel was entirely  straightened out in its proper position. The cecum was quite patulous  and the right colon viability had returned to the most part; however,  concerned with progressive dysfunction or ischemia in the bowel  progressing, and with this resection was performed. The distal ileum  was staple transected with the [place] 60-mm stapler with the gold load. The proximal right side of the transverse colon was staple transected  after clearing the omentum off of the bowel in this region with the  [place] 60-mm stapler with the gold load. Mesentery was then taken down  with the LigaSure and oversewn in few places with 3-0 silk sutures  hemostatically. Surgical team changed gloves after resection was  completed. I then performed a side-to-side functional end-to-end  anastomosis with the [place] 60-mm stapler with the gold load. The  anastomosis was widely patent, had excellent perfusion and was under no  tension. The common opening was brought together and closed with the  final firing of the [place] 60-mm stapler with the gold load. Surgical  team then again changed gloves, removed all used instruments. The  anastomosis was oversewn with interrupted 3-0 silk Lembert sutures  throughout. We then changed gloves again a final time and placed the  bowel intra-abdominally in its proper position. Nasogastric tube was  inserted and ensured in the stomach in proper position. The omentum  which was mobilized was placed overlying the small bowel and the  anastomosis at this point, the wound was then closed. Running loop  0-PDS suture was used superiorly and inferiorly tying the sutures in the  mid wound. Local anesthetic with Marcaine and Exparel was used  throughout the wound region as well. Skin was closed with jarad. GINGER wound dressing was then placed. The patient was taken to recovery  room in stable condition postop.         Hernan Machuca MD    D: 06/28/2022 1:45:24       T: 06/28/2022 1:48:59     [email or web address]  Job#: [id number]     Doc#: [id number]    CC:  SHAREE De La Cruz NP

## 2022-06-28 NOTE — PROGRESS NOTES
[facility]        Progress Note    Patient Name: Corey Mota  MRN: [id number]  [place]: 1945  Date of Evaluation: 2022    Subjective:  No acute events overnight  Pain controlled  No nausea or vomiting, NGT in place  No flatus or BM  Resting in bed at this time    Post-Operative Day #1      Vital Signs:  Patient Vitals for the past 24 hrs:   BP Temp Temp src Pulse Resp SpO2 Height Weight   22 1200 (!) 95/38 97.1 °F (36.2 °C) Temporal 75 13 96 % -- --   22 1050 118/61 -- -- 63 19 90 % -- --   22 0900 (!) 98/52 -- -- 73 15 93 % -- --   22 0800 (!) 106/47 97 °F (36.1 °C) Temporal 68 18 96 % -- --   22 0600 (!) 104/50 -- -- 67 11 100 % -- --   22 0500 (!) 109/57 -- -- 75 11 94 % -- --   22 0400 (!) 102/51 -- -- 77 12 93 % -- --   22 0300 120/72 -- -- 72 16 93 % -- --   22 0215 128/67 96.8 °F (36 °C) Temporal 65 14 95 % -- 252 lb 10.4 oz (114.6 kg)   22 0212 137/66 -- -- 68 12 -- -- --   22 0150 (!) 118/57 97 °F (36.1 °C) Temporal 72 16 97 % -- --   22 0145 117/61 -- -- 82 21 97 % -- --   22 0140 125/69 -- -- 73 11 100 % -- --   22 0132 131/66 96.8 °F (36 °C) Temporal 71 18 99 % -- --   22 2246 132/75 -- -- 69 15 97 % -- --   22 126/83 97.7 °F (36.5 °C) -- 93 14 96 % 5' 6\" (1.676 m) 252 lb (114.3 kg)      TEMPERATURE HISTORY 24H: Temp (24hrs), Av.1 °F (36.2 °C), Min:96.8 °F (36 °C), Max:97.7 °F (36.5 °C)    BLOOD PRESSURE HISTORY: Systolic (61VND), IUS:154 , Min:95 , IZM:389    Diastolic (99GNA), AHD:60, Min:38, Max:83      Intake/Output:  I/O last 3 completed shifts:   In: 1986.9 [I.V.:1734.9; IV Piggyback:252]  Out: 195 [SRMEJ:830]  I/O this shift:  In: 60.5 [I.V.:12.5; IV Piggyback:48]  Out: -   Drain/tube Output:       Physical Exam:  General: awake, alert, oriented to  person, place, time  Lungs: unlabored respirations  Abdomen: soft, mildly distended, incisional tenderness only, bowel sounds present   Drain: NGT with bilious output  Skin/Wound: GINGER dressing in place with small amount of bloody drainage, seal/suction intact    Labs:  CBC:    Recent Labs     06/27/22 2057 06/28/22  0600   WBC 14.7* 12.8*   HGB 15.7 12.9   HCT 48.9* 39.4    195     BMP:    Recent Labs     06/27/22 2057 06/28/22  0600 06/28/22  1240    137  --    K 4.9 5.5* 5.2*   CL 99 103  --    CO2 24 25  --    BUN 15 15  --    CREATININE 1.3* 1.0  --    GLUCOSE 180* 198*  --      Hepatic:    Recent Labs     06/27/22 2057   AST 33   ALT 38   BILITOT 0.5   ALKPHOS 81     Amylase:    Lab Results   Component Value Date    AMYLASE 38 03/20/2017     Lipase:    Lab Results   Component Value Date    LIPASE 107.0 06/27/2022    LIPASE 58.0 03/20/2017    LIPASE 16.0 03/12/2017      Mag:    Lab Results   Component Value Date    MG 2.20 02/18/2019    MG 2.00 03/29/2018     Phos:   No results found for: PHOS   Coags:   Lab Results   Component Value Date    PROTIME 11.8 02/18/2019    INR 1.04 02/18/2019    APTT 37.2 03/26/2018       Cultures:  Anaerobic culture  No results found for: LABANAE  Fungus stain  No results found for requested labs within last 30 days. Gram stain  No results found for requested labs within last 30 days. Organism  Lab Results   Component Value Date/Time    ORG Klebsiella pneumoniae (A) 03/24/2022 04:07 PM     Surgical culture  No results found for: CXSURG  Blood culture 1  No results found for requested labs within last 30 days. Blood culture 2  No results found for requested labs within last 30 days. Fecal occult  No results found for requested labs within last 30 days. GI bacterial pathogens by PCR  No results found for requested labs within last 30 days. C. difficile  No results found for requested labs within last 30 days.      Urine culture  Lab Results   Component Value Date    LABURIN >100,000 CFU/ml 03/24/2022       Pathology:  Pathology results pending     Imaging:  I have personally reviewed the following films:    CT ABDOMEN PELVIS WO CONTRAST Additional Contrast? None    Result Date: 6/27/2022  EXAMINATION: CT OF THE ABDOMEN AND PELVIS WITHOUT CONTRAST 6/27/2022 8:43 pm TECHNIQUE: CT of the abdomen and pelvis was performed without the administration of intravenous contrast. Multiplanar reformatted images are provided for review. Automated exposure control, iterative reconstruction, and/or weight based adjustment of the mA/kV was utilized to reduce the radiation dose to as low as reasonably achievable. COMPARISON: None. HISTORY: ORDERING SYSTEM PROVIDED HISTORY: abd pain n/v TECHNOLOGIST PROVIDED HISTORY: Reason for exam:->abd pain n/v Additional Contrast?->None Decision Support Exception - unselect if not a suspected or confirmed emergency medical condition->Emergency Medical Condition (MA) Reason for Exam: abd pain n/v FINDINGS: Lower Chest: Scarring in the anterior right middle lobe and lingula. No acute findings. Small hiatal hernia. Organs: Evaluation of the abdominal and pelvic viscera is limited in the absence of contrast.  Subtle lobulated liver contour and widening of the fissural spaces suggestive of chronic liver disease. Cholecystectomy. The pancreas, spleen, adrenals and kidneys reveal no acute findings. There is an indeterminate 1.8 cm lesion arising from the posterosuperior pole the left kidney. GI/Bowel: Findings compatible cecal volvulus, with the dilated cecum located in the left upper quadrant measuring up to 10 cm in greatest dimension. Associated twisting of the mesentery and transition point on axial image 155. No small bowel distension. Mesenteric edema is noted. No pneumatosis or gross perforation identified. Distal colonic diverticulosis. Pelvis: No acute findings. Peritoneum/Retroperitoneum: Trace abdominopelvic ascites. No free air. No lymphadenopathy.  Bones/Soft Tissues: No acute osseous abnormality identified. Multilevel degenerative disc disease and facet arthropathy with degenerative grade 1 anterolisthesis of L4.     1.  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 Associated mesenteric edema and trace abdominopelvic ascites. No pneumatosis or gross perforation. Findings were discussed with Jessee Carroll at 9:58 pm on 6/27/2022. 2.  Incidental 1.8 cm indeterminate left renal lesion for which nonemergent follow-up with contrast-enhanced CT or MRI renal mass protocol is recommended. 3.  Morphologic findings suggestive of chronic liver disease. 4.  Additional chronic and benign findings, as above. Scheduled Meds:   piperacillin-tazobactam  3,375 mg IntraVENous Q8H    metoprolol  2.5 mg IntraVENous Q6H    pantoprazole  40 mg IntraVENous Daily    sodium chloride flush  5-40 mL IntraVENous 2 times per day    [START ON 6/29/2022] enoxaparin  40 mg SubCUTAneous Nightly    acetaminophen  1,000 mg Oral 3 times per day    metoclopramide  10 mg IntraVENous Q6H     Continuous Infusions:   sodium chloride      dextrose 5 % and 0.45 % NaCl 100 mL/hr at 06/28/22 1051     PRN Meds:.sodium chloride flush, sodium chloride, ondansetron **OR** ondansetron, polyethylene glycol, acetaminophen **OR** acetaminophen, oxyCODONE, morphine **OR** morphine, ketorolac      Assessment:  65 y.o. female admitted with   1. Cecal volvulus ([facility] [address]    2. Septicemia ([facility] [address]    3. Volvulus ([facility] [address]        OR Date 6/27/2022, exploratory laparotomy, detorsion of volvulus, right hemicolectomy with primary ileocolonic anastomosis for cecal volvulus  Hypertension  Acute kidney injury      Plan:  1. Pain controlled, no nausea or vomiting with NGT, vitals stable, labs improved; continued supportive care, keep GINGER dressing in place  2. NPO with NGT decompression--anticipate will be able to remove in the next day or two; monitor bowel function  3.  IV hydration--remove KCL from IVF, monitor hyperkalemia; monitor and correct electrolytes  4. Antibiotics  5. Activity as tolerated, ambulate TID--PT/OT evaluation and treatment  6. Pulmonary toilet, incentive spirometry  7. PRN analgesics and antiemetics--minimizing narcotics as tolerated, continue scheduled Tylenol  8. DVT prophylaxis with Lovenox and SCD's  9. Management of medical comorbid etiologies per consulting services    EDUCATION:  Educated patient on plan of care and disease process--all questions answered. Plans discussed with patient and nursing. Reviewed and discussed with Dr. Regine Granados.       Signed:  CARLIE Batres - CNP  6/28/2022 2:49 PM     Surgery Staff:     Pt seen and examined with NP  See full note above  Alert and in good spirits this am  Keep TIGIST and sahra today  Surgery reviewed, all Q answered      Hermelinda Hdez MD

## 2022-06-28 NOTE — BRIEF OP NOTE
Brief Postoperative Note      Patient: Snow Spear  YOB: 1945  MRN: [id number]    Date of Procedure: 6/27/2022    Pre-Op Diagnosis: Volvulus ([address] [K56.2]    Post-Op Diagnosis: Same       Procedure(s):  EXPLORATORY LAPAROTOMY, RIGHT HEMICOLECTOMY    Surgeon(s):  Olga Pina MD    Assistant:  Surgical Assistant: Sita Clancy    Anesthesia: General    Estimated Blood Loss (mL): Minimal    Complications: None    Specimens:   ID Type Source Tests Collected by Time Destination   A : A) RIGHT HEMICOLECTOMY Tissue Tissue SURGICAL PATHOLOGY Olga Pina MD 6/28/2022 4201        Implants:  * No implants in log *      Drains:   Urinary Catheter 2 Way; [place] (Active)       Findings: Cecal Volvulus. Bowel ischemic but not perforated. Resection completed.     Electronically signed by Olga Pina MD on 6/28/2022 at 1:21 AM

## 2022-06-28 NOTE — ED PROVIDER NOTES
I independently saw performed a substantive portion of the visit (history, physical, and MDM) on [place]. All diagnostic, treatment, and disposition decisions were made by myself in conjunction with the advanced practice provider. I have participated in the medical decision making and directed the treatment plan and disposition of the patient. For further details of [facility] emergency department encounter, please see the advanced practice provider's documentation. Dianelys Penn MD, am the primary physician provider of record. CHIEF COMPLAINT  Chief Complaint   Patient presents with    Flank Pain     right and left flank pain since this am with emesis        Briefly, [place] Life is a 65 y.o. female  who presents to the ED complaining of generalized abdominal pain, sometimes more right-sided upper but sometimes more left lower quadrant. She has had nausea with vomiting today as well. She had a loose stool yesterday but no bowel movements today. No fevers. Has a history of cholecystectomy in the past.  The pain colicky nature of it is somewhat similar to her cholecystitis previously. No CP or SOB. FOCUSED PHYSICAL EXAMINATION  /83   Pulse 93   Temp 97.7 °F (36.5 °C)   Resp 14   Ht 5' 6\" (1.676 m)   Wt 252 lb (114.3 kg)   SpO2 96%   BMI 40.67 kg/m²    Focused physical examination notable for no acute distress, well-appearing, well-nourished, normal speech and mentation without obvious facial droop, no obvious rash. No obvious cranial nerve deficits on my initial exam.  Mild abdominal distention with moderate diffuse tenderness, no guarding or rebound or peritonitis on my exam, hypoactive bowel sounds noted. Dry mucous membranes. Regular rate and rhythm clear to auscultation bilaterally.     MDM:  Diagnostic considerations included kidney stone, pyelonephritis, UTI, appendicitis, bowel obstruction, diverticulitis, hernia, gastritis/gastroenteritis, pancreatitis, cholecystitis, hepatitis, constipation, IBS, IBD    ED course was notable for concern for mild lactate elevation with cecal volvulus and a leukocytosis. Lactate and white count could be related to potential bowel ischemia versus severe sepsis so we will treat accordingly with fluid resuscitation, antibiotics and blood cultures. Patient was kept n.p.o. in the ED. Surgery was consulted and will plan for OR intervention tonight. Is this patient to be included in the SEP-1 Core Measure? Yes   SEP-1 CORE MEASURE DATA      Sepsis Criteria   Severe Sepsis Criteria   Septic Shock Criteria     Must be confirmed or suspected to move forward with diagnosis of sepsis. Must meet 2:    [] Temperature > 100.9 F (38.3 C)        or < 96.8 F (36 C)  [x] HR > 90  [] RR > 20  [x] WBC > 12 or < 4 or 10% bands      AND:      [] Infection Confirmed or        Suspected. Must meet 1:    [x] Lactate > 2       or   [] Signs of Organ Dysfunction:    - SBP < 90 or MAP < 65  - Altered mental status  - Creatinine > 2 or increased from      baseline  - Urine Output < 0.5 ml/kg/hr  - Bilirubin > 2  - INR > 1.5 (not anticoagulated)  - Platelets < 748,150  - Acute Respiratory Failure as     evidenced by new need for NIPPV     or mechanical ventilation      [] No criteria met for Severe Sepsis. Must meet 1:    [] Lactate > 4        or   [] SBP < 90 or MAP < 65 for at        least two readings in the first        hour after fluid bolus        administration      [] Vasopressors initiated (if hypotension persists after fluid resuscitation)        [x] No criteria met for Septic Shock.    Patient Vitals for the past 6 hrs:   BP Temp Pulse Resp SpO2 Height Weight Percent Weight Change   06/27/22 2013 126/83 97.7 °F (36.5 °C) 93 14 96 % 5' 6\" (1.676 m) 252 lb (114.3 kg) 0      Recent Labs     06/27/22 2057   WBC 14.7*   CREATININE 1.3*   BILITOT 0.5            Time Severe Sepsis Identified: 10:20pm    Fluid Resuscitation Rational: at least 30mL/kg based on ideal body weight due to obesity defined as BMI >30 (patient's BMI is Body mass index is 40.67 kg/m². and IBW is Ideal body weight: 59.3 kg (130 lb 11.7 oz)Adjusted ideal body weight: 81.3 kg (179 lb 3.8 oz))    Repeat lactate level: ordered and pending at this time    Reassessment Exam:   Not applicable. Patient does not have septic shock. During the patient's ED course, the patient was given:  Medications   morphine injection 4 mg (4 mg IntraVENous Given 6/27/22 2211)   ondansetron (ZOFRAN) injection 4 mg (4 mg IntraVENous Given 6/27/22 2211)   piperacillin-tazobactam (ZOSYN) 3,375 mg in dextrose 5 % 50 mL IVPB (mini-bag) (has no administration in time range)   0.9 % sodium chloride infusion (1,779 mLs IntraVENous New Bag 6/27/22 2249)        CLINICAL IMPRESSION  1. Cecal volvulus ([address]    2. Septicemia ([address]        DISPOSITION  Jethro Willingham was admitted in fair condition. The plan is to admit to the hospital at this time under the hospitalist service. Hospitalist accepted the patient and will take over the patient's care. The total critical care time I independently spent while evaluating and treating this patient was 40 minutes. This excludes time spent doing separately billable procedures. This includes time at the bedside, data interpretation, medication management, obtaining critical history from collateral sources if the patient is unable to provide it directly, and physician consultation. Specifics of interventions taken and potentially life-threatening diagnostic considerations are listed above in the medical decision making. If this was a shared visit with an MOIRA, the time in this attestation is non-concurrent critical care time out of the total shared critical care time provided by the MOIRA and myself. This chart was created using Dragon dictation software.   Efforts were made by me to ensure accuracy, however some errors may be present due to limitations of this technology.             Jodi Jefferson MD  06/27/22 6868

## 2022-06-29 LAB
ANION GAP SERPL CALCULATED.3IONS-SCNC: 10 MMOL/L (ref 3–16)
BASOPHILS ABSOLUTE: 0 K/UL (ref 0–0.2)
BASOPHILS RELATIVE PERCENT: 0.3 %
BUN BLDV-MCNC: 16 MG/DL (ref 7–20)
CALCIUM SERPL-MCNC: 7.9 MG/DL (ref 8.3–10.6)
CHLORIDE BLD-SCNC: 104 MMOL/L (ref 99–110)
CO2: 23 MMOL/L (ref 21–32)
CREAT SERPL-MCNC: 1.2 MG/DL (ref 0.6–1.2)
EOSINOPHILS ABSOLUTE: 0.2 K/UL (ref 0–0.6)
EOSINOPHILS RELATIVE PERCENT: 1.5 %
GFR AFRICAN AMERICAN: 53
GFR NON-AFRICAN AMERICAN: 43
GLUCOSE BLD-MCNC: 139 MG/DL (ref 70–99)
HCT VFR BLD CALC: 38.1 % (ref 36–48)
HEMOGLOBIN: 12.3 G/DL (ref 12–16)
LYMPHOCYTES ABSOLUTE: 0.7 K/UL (ref 1–5.1)
LYMPHOCYTES RELATIVE PERCENT: 7.2 %
MCH RBC QN AUTO: 28.6 PG (ref 26–34)
MCHC RBC AUTO-ENTMCNC: 32.4 G/DL (ref 31–36)
MCV RBC AUTO: 88.5 FL (ref 80–100)
MONOCYTES ABSOLUTE: 1 K/UL (ref 0–1.3)
MONOCYTES RELATIVE PERCENT: 9.2 %
NEUTROPHILS ABSOLUTE: 8.5 K/UL (ref 1.7–7.7)
NEUTROPHILS RELATIVE PERCENT: 81.8 %
PDW BLD-RTO: 14.2 % (ref 12.4–15.4)
PLATELET # BLD: 147 K/UL (ref 135–450)
PMV BLD AUTO: 8.3 FL (ref 5–10.5)
POTASSIUM SERPL-SCNC: 4.6 MMOL/L (ref 3.5–5.1)
RBC # BLD: 4.31 M/UL (ref 4–5.2)
SODIUM BLD-SCNC: 137 MMOL/L (ref 136–145)
WBC # BLD: 10.3 K/UL (ref 4–11)

## 2022-06-29 PROCEDURE — 85025 COMPLETE CBC W/AUTO DIFF WBC: CPT

## 2022-06-29 PROCEDURE — 2580000003 HC RX 258: Performed by: SURGERY

## 2022-06-29 PROCEDURE — 97535 SELF CARE MNGMENT TRAINING: CPT

## 2022-06-29 PROCEDURE — 83735 ASSAY OF MAGNESIUM: CPT

## 2022-06-29 PROCEDURE — 2580000003 HC RX 258: Performed by: INTERNAL MEDICINE

## 2022-06-29 PROCEDURE — 97166 OT EVAL MOD COMPLEX 45 MIN: CPT

## 2022-06-29 PROCEDURE — 2580000003 HC RX 258: Performed by: NURSE PRACTITIONER

## 2022-06-29 PROCEDURE — 1200000000 HC SEMI PRIVATE

## 2022-06-29 PROCEDURE — 6370000000 HC RX 637 (ALT 250 FOR IP): Performed by: SURGERY

## 2022-06-29 PROCEDURE — 99024 POSTOP FOLLOW-UP VISIT: CPT | Performed by: SURGERY

## 2022-06-29 PROCEDURE — APPNB30 APP NON BILLABLE TIME 0-30 MINS: Performed by: NURSE PRACTITIONER

## 2022-06-29 PROCEDURE — APPSS15 APP SPLIT SHARED TIME 0-15 MINUTES: Performed by: NURSE PRACTITIONER

## 2022-06-29 PROCEDURE — 97161 PT EVAL LOW COMPLEX 20 MIN: CPT

## 2022-06-29 PROCEDURE — 2500000003 HC RX 250 WO HCPCS: Performed by: INTERNAL MEDICINE

## 2022-06-29 PROCEDURE — 97530 THERAPEUTIC ACTIVITIES: CPT

## 2022-06-29 PROCEDURE — 6360000002 HC RX W HCPCS: Performed by: INTERNAL MEDICINE

## 2022-06-29 PROCEDURE — 80048 BASIC METABOLIC PNL TOTAL CA: CPT

## 2022-06-29 PROCEDURE — 94150 VITAL CAPACITY TEST: CPT

## 2022-06-29 PROCEDURE — 6360000002 HC RX W HCPCS: Performed by: SURGERY

## 2022-06-29 PROCEDURE — C9113 INJ PANTOPRAZOLE SODIUM, VIA: HCPCS | Performed by: INTERNAL MEDICINE

## 2022-06-29 PROCEDURE — 36415 COLL VENOUS BLD VENIPUNCTURE: CPT

## 2022-06-29 RX ORDER — DIAZEPAM 5 MG/ML
2.5 INJECTION, SOLUTION INTRAMUSCULAR; INTRAVENOUS EVERY 8 HOURS PRN
Status: DISCONTINUED | OUTPATIENT
Start: 2022-06-29 | End: 2022-07-09 | Stop reason: HOSPADM

## 2022-06-29 RX ORDER — MAGNESIUM SULFATE IN WATER 40 MG/ML
2000 INJECTION, SOLUTION INTRAVENOUS PRN
Status: DISCONTINUED | OUTPATIENT
Start: 2022-06-29 | End: 2022-07-09 | Stop reason: HOSPADM

## 2022-06-29 RX ADMIN — MORPHINE SULFATE 4 MG: 4 INJECTION INTRAVENOUS at 17:03

## 2022-06-29 RX ADMIN — PIPERACILLIN AND TAZOBACTAM 3375 MG: 3; .375 INJECTION, POWDER, LYOPHILIZED, FOR SOLUTION INTRAVENOUS at 12:38

## 2022-06-29 RX ADMIN — PIPERACILLIN AND TAZOBACTAM 3375 MG: 3; .375 INJECTION, POWDER, LYOPHILIZED, FOR SOLUTION INTRAVENOUS at 05:51

## 2022-06-29 RX ADMIN — ACETAMINOPHEN 1000 MG: 500 TABLET ORAL at 21:11

## 2022-06-29 RX ADMIN — OXYCODONE 5 MG: 5 TABLET ORAL at 21:17

## 2022-06-29 RX ADMIN — METOCLOPRAMIDE 10 MG: 5 INJECTION, SOLUTION INTRAMUSCULAR; INTRAVENOUS at 10:23

## 2022-06-29 RX ADMIN — DEXTROSE AND SODIUM CHLORIDE: 5; 450 INJECTION, SOLUTION INTRAVENOUS at 06:06

## 2022-06-29 RX ADMIN — METOCLOPRAMIDE 10 MG: 5 INJECTION, SOLUTION INTRAMUSCULAR; INTRAVENOUS at 21:11

## 2022-06-29 RX ADMIN — ENOXAPARIN SODIUM 40 MG: 100 INJECTION SUBCUTANEOUS at 21:11

## 2022-06-29 RX ADMIN — MORPHINE SULFATE 4 MG: 4 INJECTION INTRAVENOUS at 12:41

## 2022-06-29 RX ADMIN — METOCLOPRAMIDE 10 MG: 5 INJECTION, SOLUTION INTRAMUSCULAR; INTRAVENOUS at 05:56

## 2022-06-29 RX ADMIN — PANTOPRAZOLE SODIUM 40 MG: 40 INJECTION, POWDER, LYOPHILIZED, FOR SOLUTION INTRAVENOUS at 10:24

## 2022-06-29 RX ADMIN — OXYCODONE 5 MG: 5 TABLET ORAL at 14:06

## 2022-06-29 RX ADMIN — DEXTROSE AND SODIUM CHLORIDE: 5; 450 INJECTION, SOLUTION INTRAVENOUS at 17:12

## 2022-06-29 RX ADMIN — METOCLOPRAMIDE 10 MG: 5 INJECTION, SOLUTION INTRAMUSCULAR; INTRAVENOUS at 17:03

## 2022-06-29 RX ADMIN — Medication 10 ML: at 10:24

## 2022-06-29 RX ADMIN — KETOROLAC TROMETHAMINE 15 MG: 30 INJECTION, SOLUTION INTRAMUSCULAR; INTRAVENOUS at 06:03

## 2022-06-29 RX ADMIN — METOPROLOL TARTRATE 2.5 MG: 5 INJECTION INTRAVENOUS at 21:11

## 2022-06-29 RX ADMIN — PIPERACILLIN AND TAZOBACTAM 3375 MG: 3; .375 INJECTION, POWDER, LYOPHILIZED, FOR SOLUTION INTRAVENOUS at 21:17

## 2022-06-29 ASSESSMENT — PAIN DESCRIPTION - LOCATION
LOCATION: ABDOMEN

## 2022-06-29 ASSESSMENT — PAIN SCALES - GENERAL
PAINLEVEL_OUTOF10: 6
PAINLEVEL_OUTOF10: 6
PAINLEVEL_OUTOF10: 4
PAINLEVEL_OUTOF10: 4
PAINLEVEL_OUTOF10: 6
PAINLEVEL_OUTOF10: 0
PAINLEVEL_OUTOF10: 2
PAINLEVEL_OUTOF10: 6

## 2022-06-29 ASSESSMENT — PAIN DESCRIPTION - ONSET
ONSET: ON-GOING

## 2022-06-29 ASSESSMENT — PAIN DESCRIPTION - PAIN TYPE
TYPE: SURGICAL PAIN
TYPE: SURGICAL PAIN

## 2022-06-29 ASSESSMENT — PAIN DESCRIPTION - DESCRIPTORS
DESCRIPTORS: ACHING

## 2022-06-29 ASSESSMENT — PAIN DESCRIPTION - ORIENTATION
ORIENTATION: MID

## 2022-06-29 ASSESSMENT — PAIN - FUNCTIONAL ASSESSMENT
PAIN_FUNCTIONAL_ASSESSMENT: PREVENTS OR INTERFERES WITH ALL ACTIVE AND SOME PASSIVE ACTIVITIES
PAIN_FUNCTIONAL_ASSESSMENT: PREVENTS OR INTERFERES WITH ALL ACTIVE AND SOME PASSIVE ACTIVITIES

## 2022-06-29 ASSESSMENT — PAIN DESCRIPTION - FREQUENCY
FREQUENCY: CONTINUOUS

## 2022-06-29 NOTE — PROGRESS NOTES
Physician Progress Note      Angélica Farris  CSN #:                  [id number]  :                       1945  ADMIT DATE:       2022 8:10 PM  DISCH DATE:  RESPONDING  PROVIDER #:        Adell Claude SCHMIDT APRN - CNP          QUERY TEXT:    Pt admitted with Cecal volvulus. Noted documentation of Septicemia on  ED   note by ordered ED physician. If possible, please document in progress notes   and discharge summary:      The medical record reflects the following:  Risk Factors: Cecal volvulus  Clinical Indicators: Pt admitted with Cecal volvulus. Noted documentation of   Septicemia on  ED note. WBC 14.7, RR 21, Lac Sepsis 2.8  Treatment: IV fluids, IV Zosyn  Options provided:  -- Septicemia confirmed present on admission  -- Septicemia ruled out  -- Other - I will add my own diagnosis  -- Disagree - Not applicable / Not valid  -- Disagree - Clinically unable to determine / Unknown  -- Refer to Clinical Documentation Reviewer    PROVIDER RESPONSE TEXT:    The diagnosis of Septicemia was confirmed as present on admission.     Query created by: Merly Robles on 2022 10:36 AM      Electronically signed by:  Danilo Carvajal CNP 2022 12:38 PM

## 2022-06-29 NOTE — PROGRESS NOTES
Bedside report received from day shift RN. Patient resting comfortably in bed, watching television. No signs of discomfort or distress. Bed is in lowest position, wheels locked, 2/2 side rails up. Bedside table and call light within reach. White board updated. Will continue to monitor patient. Adela Coon RN    9:01 PM  Shift assessment complete. (See findings in flowsheet). Med pass complete. (See MAR). VSS. Patient with no complaints at this time. Message sent to provider. Patient has been having very short runs of SVT. VSS and she is asymptomatic. Orders placed by the provider to check, and replaced mag PRN. Adela Coon RN    11:56 PM  I called the lab to see why blood had not been collected yet.  said that the test \"should have been added to blood that was drawn ealier, but it wasn't done\". She is going to do that now and result it ASAP. Adela Coon RN    1:34 AM  Mag order that was added, still has not been resulted. Spoke to  again, and she tells me they cannot see the order. Lab test reordered STAT. I asked tech to please send someone ASAP to draw this blood and have it resulted. Adela Coon RN    3:30 AM  Patient resting quietly in bed. No apparent needs at this time. Adela Coon RN    5:31 AM  Patient up to bathroom, ambulating in room. C/o abdominal pain. PRN oxy given for pain. Adela Coon RN    5:49 AM  Message sent to provider \"atient admitted 6/27 for abd pain, found to have cecal volvulous. To OR 6/27 for emergent hemicholectomy. Has been doing well. Vitals have been stable entire shift. Morning dose of metoprolol held as SBP was soft. Michael Cabrera has noted patient to be tachy, up to 200's very briefly, mostly 120-150's. STAT EKG shows afib RVR . (She does have a history of afib) While I was assessing patient, she admitted to \"pretty severe pain\", had previously been telling me that she didnt need pain meds. I gave her dose of oxy.  Do you want me to go ahead and give that dose of metoprolol? Anything else you want to add? \" Sowmya Duggan RN    6:17 AM  Paged again, waiting for response. Sowmya Duggan RN    6:22 AM  Spoke to MD. Papa Pichardo to give dose of IV metoprolol. Sowmya Duggan RN    6:29 AM  BP set to cycle q15m to monitor closely. Patient remains asymptomatic. Sowmya Duggan RN    7:11 AM  Report given to [facility].  Sowmya Duggan RN

## 2022-06-29 NOTE — PROGRESS NOTES
[facility] PROGRESS NOTE    6/29/2022 1:33 PM        Name: Niki Delacruz . Admitted: 6/27/2022  Primary Care Provider: CARLIE Hernandez CNP (Tel: [phone])                        Subjective:  . No acute events overnight. Resting well. Pain control. Diet ok. Labs reviewed  Denies any chest pain sob.      Reviewed interval ancillary notes    Current Medications  piperacillin-tazobactam (ZOSYN) 3,375 mg in dextrose 5 % 50 mL IVPB (mini-bag), Q8H  metoprolol (LOPRESSOR) injection 2.5 mg, Q6H  pantoprazole (PROTONIX) injection 40 mg, Daily  sodium chloride flush 0.9 % injection 5-40 mL, 2 times per day  sodium chloride flush 0.9 % injection 5-40 mL, PRN  0.9 % sodium chloride infusion, PRN  ondansetron (ZOFRAN-ODT) disintegrating tablet 4 mg, Q8H PRN   Or  ondansetron (ZOFRAN) injection 4 mg, Q6H PRN  polyethylene glycol (GLYCOLAX) packet 17 g, Daily PRN  acetaminophen (TYLENOL) tablet 650 mg, Q6H PRN   Or  acetaminophen (TYLENOL) suppository 650 mg, Q6H PRN  enoxaparin (LOVENOX) injection 40 mg, Nightly  acetaminophen (TYLENOL) tablet 1,000 mg, 3 times per day  oxyCODONE (ROXICODONE) immediate release tablet 5 mg, Q6H PRN  morphine (PF) injection 2 mg, Q2H PRN   Or  morphine injection 4 mg, Q2H PRN  metoclopramide (REGLAN) injection 10 mg, Q6H  ketorolac (TORADOL) injection 15 mg, Q6H PRN  dextrose 5 % and 0.45 % sodium chloride infusion, Continuous        Objective:  BP (!) 111/57   Pulse 73   Temp 98.1 °F (36.7 °C) (Oral)   Resp 18   Ht 5' 6\" (1.676 m)   Wt 271 lb (122.9 kg)   SpO2 94%   BMI 43.74 kg/m²     Intake/Output Summary (Last 24 hours) at 6/29/2022 1333  Last data filed at 6/29/2022 1024  Gross per 24 hour   Intake 1284.02 ml   Output 1740 ml   Net -455.98 ml      Wt Readings from Last 3 Encounters:   06/29/22 271 lb (122.9 kg)   03/24/22 254 lb 12.8 oz (115.6 kg)   09/28/21 250 lb (113.4 kg)       General appearance:  Appears comfortable  Eyes: Sclera clear. Pupils equal.  ENT: Moist oral mucosa. Trachea midline, no adenopathy. Cardiovascular: Regular rhythm, normal S1, S2. No murmur. No edema in lower extremities  Respiratory: Not using accessory muscles. Good inspiratory effort. Clear to auscultation bilaterally, no wheeze or crackles. GI: Abdomen soft, no tenderness, not distended, normal bowel sounds  Musculoskeletal: No cyanosis in digits, neck supple  Neurology: CN 2-12 grossly intact. No speech or motor deficits  Psych: Normal affect. Alert and oriented in time, place and person  Skin: Warm, dry, normal turgor    Labs and Tests:  CBC:   Recent Labs     06/27/22 2057 06/28/22  0600 06/29/22  0950   WBC 14.7* 12.8* 10.3   HGB 15.7 12.9 12.3    195 147     BMP:    Recent Labs     06/27/22 2057 06/28/22  0600 06/28/22  1240 06/29/22  0950    137  --  137   K 4.9 5.5* 5.2* 4.6   CL 99 103  --  104   CO2 24 25  --  23   BUN 15 15  --  16   CREATININE 1.3* 1.0  --  1.2   GLUCOSE 180* 198*  --  139*     Hepatic:   Recent Labs     06/27/22 2057   AST 33   ALT 38   BILITOT 0.5   ALKPHOS 81       Discussed care with patient             Problem List  Principal Problem:    Cecal volvulus ([address]  Active Problems:    Acquired hypothyroidism    GERD (gastroesophageal reflux disease)    Atrial fibrillation (HCC)    Chronic combined systolic (congestive) and diastolic (congestive) heart failure (HCC)  Resolved Problems:    * No resolved hospital problems. *       Assessment & Plan:   1. Cecal volvulus  -Patient is status post surgery continue management per general surgery  Diet recommendation. Hypertension  -Blood pressure stable. Still holding BP medications able to tolerate p.o. well    GERD PPI    History of A. fib rate controlled    Chronic CHF diastolic  Seems to be compensated.   We will monitor for now holding off on Lasix will resume slowly        Diet: Diet NPO Exceptions are: Ice Chips, Sips of Water with Meds, Popsicles  Code:Full Code  DVT PPX lovenox       Irais Fine MD   6/29/2022 1:33 PM

## 2022-06-29 NOTE — PLAN OF CARE
Pred Forte    3x/day until 6/19  2x/day until 6/22  1x/day until 6/25    No drops on 6/26   Problem: Chronic Conditions and Co-morbidities  Goal: Patient's chronic conditions and co-morbidity symptoms are monitored and maintained or improved  Outcome: Progressing     Problem: Discharge Planning  Goal: Discharge to home or other facility with appropriate resources  Outcome: Progressing     Problem: Pain  Goal: Verbalizes/displays adequate comfort level or baseline comfort level  Outcome: Progressing     Problem: Safety - Adult  Goal: Free from fall injury  Outcome: Progressing     Problem: ABCDS Injury Assessment  Goal: Absence of physical injury  Outcome: Progressing

## 2022-06-29 NOTE — PROGRESS NOTES
Pt transferred from ICU. BP soft, evening BP med held. Robertson in place. Per ICU RN, pt output is minimal (30ml/hr), but MD is aware. NG present, Right nare at 65cm, bilious output. GINGER dressing in place with old bloody drainage. Denies nausea. Requested PRN for pain. The care plan and education has been reviewed and mutually agreed upon with the patient. Patient remains free from falls. All fall precautions in place. Yellow bracelet on patient. SAFE sign on door. Bed and chair alarms being used. Bed in lowest position. Will monitor.

## 2022-06-29 NOTE — PROGRESS NOTES
Patient worked up therapy today and got up to chair. This nurse spoke to Dr Nayeli Lee patient wanted to wait for bocanegra removal until tomorrow. Nurse educated on increase risk for infection, importance of getting up and use of bedside commode. Patient requested nurse ask for delay of removal. Dr. Nayeli Lee insisted on taking out due to increase risk of infection. Patient advised, nurse removed approx 1630. Patient was in a lot of pain when returning to bed, see MAR. Heat pad applied for spasms. Patient responded well to PRN meds and heat pad. SCDs on. Will continue to monitor.    Alicia Miguel RN

## 2022-06-29 NOTE — PLAN OF CARE
Problem: Chronic Conditions and Co-morbidities  Goal: Patient's chronic conditions and co-morbidity symptoms are monitored and maintained or improved  6/29/2022 1158 by Allegra De La Cruz RN  Outcome: Progressing  Flowsheets (Taken 6/29/2022 1158)  Care Plan - Patient's Chronic Conditions and Co-Morbidity Symptoms are Monitored and Maintained or Improved: Monitor and assess patient's chronic conditions and comorbid symptoms for stability, deterioration, or improvement  6/29/2022 0436 by Bora Chandra RN  Outcome: Progressing     Problem: Discharge Planning  Goal: Discharge to home or other facility with appropriate resources  6/29/2022 1158 by Allegra De La Cruz RN  Outcome: Progressing  Flowsheets (Taken 6/29/2022 1158)  Discharge to home or other facility with appropriate resources: Identify barriers to discharge with patient and caregiver  6/29/2022 0436 by Bora Chandra RN  Outcome: Progressing     Problem: Pain  Goal: Verbalizes/displays adequate comfort level or baseline comfort level  6/29/2022 1158 by Allegra De La Cruz RN  Outcome: Progressing  Flowsheets (Taken 6/29/2022 1158)  Verbalizes/displays adequate comfort level or baseline comfort level: Encourage patient to monitor pain and request assistance  6/29/2022 0436 by Bora Chandra RN  Outcome: Progressing     Problem: Safety - Adult  Goal: Free from fall injury  6/29/2022 1158 by Allegra De La Cruz RN  Outcome: Progressing  Flowsheets (Taken 6/29/2022 1158)  Free From Fall Injury: Instruct family/caregiver on patient safety  6/29/2022 0436 by Bora Chandra RN  Outcome: Progressing     Problem: ABCDS Injury Assessment  Goal: Absence of physical injury  6/29/2022 1158 by Allegra De La Cruz RN  Outcome: Progressing  Flowsheets (Taken 6/29/2022 1158)  Absence of Physical Injury: Implement safety measures based on patient assessment  6/29/2022 0436 by Bora Chandra RN  Outcome: Liz Daniel RN

## 2022-06-29 NOTE — PROGRESS NOTES
[facility]        Progress Note    Patient Name: Amparo Grigsby  MRN: [id number]  [place]: 1945  Date of Evaluation: 2022    Subjective:  No acute events overnight  Pain controlled with Toradol, reports intermittent \"muscle spasms\"  No nausea or vomiting, NGT in place--low output  No flatus or BM  Resting in bed at this time    Post-Operative Day #2      Vital Signs:  Patient Vitals for the past 24 hrs:   BP Temp Temp src Pulse Resp SpO2 Weight   22 1230 (!) 111/57 98.1 °F (36.7 °C) Oral 73 18 94 % --   22 0830 106/66 97.9 °F (36.6 °C) Oral 71 18 95 % --   22 0408 (!) 96/58 97.1 °F (36.2 °C) Skin 70 16 97 % 271 lb (122.9 kg)   22 0045 (!) 96/59 98 °F (36.7 °C) Axillary 68 16 95 % --   22 2135 (!) 98/53 -- -- 69 18 95 % --   22 2105 (!) 92/52 -- -- 72 20 97 % --   22 2035 112/64 98.3 °F (36.8 °C) Oral 67 18 97 % --   22 1600 104/62 (!) 96.6 °F (35.9 °C) Temporal 74 23 97 % --   22 1400 (!) 95/43 -- -- 69 13 96 % --      TEMPERATURE HISTORY 24H: Temp (24hrs), Av.7 °F (36.5 °C), Min:96.6 °F (35.9 °C), Max:98.3 °F (36.8 °C)    BLOOD PRESSURE HISTORY: Systolic (37SWW), GS , Min:92 , UBV:200    Diastolic (18AYJ), MDN:16, Min:38, Max:72      Intake/Output:  I/O last 3 completed shifts: In: 3291.4 [I.V.:2971.3; IV Piggyback:320.1]  Out:  [Urine:1640; Emesis/NG output:400]  I/O this shift:   In: 40 [I.V.:40]  Out: -   Drain/tube Output:       Physical Exam:  General: awake, alert, oriented to person, place, time  Lungs: unlabored respirations  Abdomen: soft, mildly distended, incisional tenderness only, bowel sounds present   Drain: NGT with bilious output--removed  Skin/Wound: GINGER dressing in place with small amount of bloody drainage, seal/suction intact    Labs:  CBC:    Recent Labs     227 22  0600 22  0950   WBC 14.7* 12.8* 10.3   HGB 15.7 12.9 12.3   HCT 48.9* 39.4 38.1  195 147     BMP:    Recent Labs     06/27/22 2057 06/28/22  0600 06/28/22  1240 06/29/22  0950    137  --  137   K 4.9 5.5* 5.2* 4.6   CL 99 103  --  104   CO2 24 25  --  23   BUN 15 15  --  16   CREATININE 1.3* 1.0  --  1.2   GLUCOSE 180* 198*  --  139*     Hepatic:    Recent Labs     06/27/22 2057   AST 33   ALT 38   BILITOT 0.5   ALKPHOS 81     Amylase:    Lab Results   Component Value Date    AMYLASE 38 03/20/2017     Lipase:    Lab Results   Component Value Date    LIPASE 107.0 06/27/2022    LIPASE 58.0 03/20/2017    LIPASE 16.0 03/12/2017      Mag:    Lab Results   Component Value Date    MG 2.20 02/18/2019    MG 2.00 03/29/2018     Phos:   No results found for: PHOS   Coags:   Lab Results   Component Value Date    PROTIME 11.8 02/18/2019    INR 1.04 02/18/2019    APTT 37.2 03/26/2018       Cultures:  Anaerobic culture  No results found for: LABANAE  Fungus stain  No results found for requested labs within last 30 days. Gram stain  No results found for requested labs within last 30 days. Organism  Lab Results   Component Value Date/Time    ORG Klebsiella pneumoniae (A) 03/24/2022 04:07 PM     Surgical culture  No results found for: CXSURG  Blood culture 1  Results in Past 30 Days  Result Component Current Result Ref Range Previous Result Ref Range   Blood Culture, Routine No Growth to date. Any change in status will be called. (6/27/2022)  Not in Time Range      Blood culture 2  Results in Past 30 Days  Result Component Current Result Ref Range Previous Result Ref Range   Culture, Blood 2 No Growth to date. Any change in status will be called. (6/27/2022)  Not in Time Range      Fecal occult  No results found for requested labs within last 30 days. GI bacterial pathogens by PCR  No results found for requested labs within last 30 days. C. difficile  No results found for requested labs within last 30 days.      Urine culture  Lab Results   Component Value Date    LABURIN >100,000 CFU/ml 03/24/2022       Pathology:  OR 6/27/2022--FINAL DIAGNOSIS:     Colon and terminal ileum, right hemicolectomy:   -Congestion of colonic wall consistent with history of volvulus.   -Histologically unremarkable appendix.   -Negative for malignancy. Imaging:  I have personally reviewed the following films:    CT ABDOMEN PELVIS WO CONTRAST Additional Contrast? None    Result Date: 6/27/2022  EXAMINATION: CT OF THE ABDOMEN AND PELVIS WITHOUT CONTRAST 6/27/2022 8:43 pm TECHNIQUE: CT of the abdomen and pelvis was performed without the administration of intravenous contrast. Multiplanar reformatted images are provided for review. Automated exposure control, iterative reconstruction, and/or weight based adjustment of the mA/kV was utilized to reduce the radiation dose to as low as reasonably achievable. COMPARISON: None. HISTORY: ORDERING SYSTEM PROVIDED HISTORY: abd pain n/v TECHNOLOGIST PROVIDED HISTORY: Reason for exam:->abd pain n/v Additional Contrast?->None Decision Support Exception - unselect if not a suspected or confirmed emergency medical condition->Emergency Medical Condition (MA) Reason for Exam: abd pain n/v FINDINGS: Lower Chest: Scarring in the anterior right middle lobe and lingula. No acute findings. Small hiatal hernia. Organs: Evaluation of the abdominal and pelvic viscera is limited in the absence of contrast.  Subtle lobulated liver contour and widening of the fissural spaces suggestive of chronic liver disease. Cholecystectomy. The pancreas, spleen, adrenals and kidneys reveal no acute findings. There is an indeterminate 1.8 cm lesion arising from the posterosuperior pole the left kidney. GI/Bowel: Findings compatible cecal volvulus, with the dilated cecum located in the left upper quadrant measuring up to 10 cm in greatest dimension. Associated twisting of the mesentery and transition point on axial image 155. No small bowel distension. Mesenteric edema is noted.   No pneumatosis or gross perforation identified. Distal colonic diverticulosis. Pelvis: No acute findings. Peritoneum/Retroperitoneum: Trace abdominopelvic ascites. No free air. No lymphadenopathy. Bones/Soft Tissues: No acute osseous abnormality identified. Multilevel degenerative disc disease and facet arthropathy with degenerative grade 1 anterolisthesis of L4.     1.  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 Associated mesenteric edema and trace abdominopelvic ascites. No pneumatosis or gross perforation. Findings were discussed with Gabbie Darling at 9:58 pm on 6/27/2022. 2.  Incidental 1.8 cm indeterminate left renal lesion for which nonemergent follow-up with contrast-enhanced CT or MRI renal mass protocol is recommended. 3.  Morphologic findings suggestive of chronic liver disease. 4.  Additional chronic and benign findings, as above. Scheduled Meds:   piperacillin-tazobactam  3,375 mg IntraVENous Q8H    metoprolol  2.5 mg IntraVENous Q6H    pantoprazole  40 mg IntraVENous Daily    sodium chloride flush  5-40 mL IntraVENous 2 times per day    enoxaparin  40 mg SubCUTAneous Nightly    acetaminophen  1,000 mg Oral 3 times per day    metoclopramide  10 mg IntraVENous Q6H     Continuous Infusions:   sodium chloride      dextrose 5 % and 0.45 % NaCl 100 mL/hr at 06/29/22 0606     PRN Meds:.sodium chloride flush, sodium chloride, ondansetron **OR** ondansetron, polyethylene glycol, acetaminophen **OR** acetaminophen, oxyCODONE, morphine **OR** morphine, ketorolac      Assessment:  65 y.o. female admitted with   1. Cecal volvulus ([facility] [address]    2. Septicemia ([facility] [address]    3. Volvulus ([facility] [address]        OR Date 6/27/2022, exploratory laparotomy, detorsion of volvulus, right hemicolectomy with primary ileocolonic anastomosis for cecal volvulus  Hypertension  Acute kidney injury      Plan:  1.  Pain controlled with Toradol--reports intermittent \"muscle spasms\", no nausea or vomiting with NGT--output low, vitals stable, labs improved; continued supportive care, keep GINGER dressing in place  2. Remove NGT but continue NPO/ice today; monitor bowel function, urine output improved, remove Bocanegra  3. IV hydration, hyperkalemia resolved; monitor and correct electrolytes  4. Antibiotics  5. Activity as tolerated, ambulate TID--PT/OT evaluation and treatment  6. Pulmonary toilet, incentive spirometry  7. PRN analgesics and antiemetics--minimizing narcotics as tolerated, continue scheduled Tylenol  8. DVT prophylaxis with Lovenox and SCD's  9. Management of medical comorbid etiologies per consulting services    EDUCATION:  Educated patient on plan of care and disease process--all questions answered. Plans discussed with patient and nursing. Reviewed and discussed with Dr. Lala Powers.       Signed:  CARLIE Berger - CNP  6/29/2022 12:50 PM     Surgery Staff:     Pt seen and examined with NP  See full note above  Doing well  DC NG, DC bocanegra  Needs OOB today, up to chair  Clears tomorrow  Valium for muscle spasm    Maegan Khan MD

## 2022-06-29 NOTE — PROGRESS NOTES
see below for the latest assessment towards goals. DME Required For Discharge: bedside commode, shower chair with back, hand-held shower head, long-handle sponge    Precautions/Restrictions: no restrictions, high fall risk NPO ice chips only  Weight Bearing Restrictions: no restrictions  [] Right Upper Extremity  [] Left Upper Extremity [] Right Lower Extremity  [] Left Lower Extremity     Required Braces/Orthotics: no braces required   [] Right  [] Left  Positional Restrictions:no positional restrictions      Pre-Admission Information   Lives With: alone (pt has friends nearby that can assist with needs as needed)                 Type of Home: house  Home Layout: one level  Home Access: 1 step to enter without rails   Bathroom Layout: tub/shower unit  Toilet Height: standard height  Bathroom Equipment: grab bars in shower, hand held shower head  Home Equipment: single point cane  Transfer Assistance: Independent without use of device  Ambulation Assistance:Independent without use of device  ADL Assistance: independent with all ADL's  IADL Assistance: independent with homemaking tasks  Active :        [x]? Yes            []? No  Current Employment: retired. Occupation: several jobs  Hobbies: swimming, going out with friends to eat, spend time with 14 y/o grandson  Recent Falls: Pt reports no recent falls. Examination     Vision:   Vision Gross Assessment: WFL  Hearing:   WFL  Perception:   WFL  Observation:   Incision/Scar:  abdomen with pain pump in place, IV in right ue   Posture:   good  Sensation:   WFL  Proprioception:    WFL  Tone:   Normotonic  Coordination Testing:   WFL    ROM:   (B) UE AROM WFL  Strength:   (B) UE strength grossly WFL    Decision Making: medium complexity  Clinical Presentation: stable      Subjective    General:  Patient in good spirits and agreeable to therapy evaluation  Pain: 5/10.   Location: abdomen  Pain Interventions: pain medication in place prior to arrival and repositioned            Activities of Daily Living    Basic Activities of Daily Living  Grooming: supervision  Grooming Comments: set up from chair  Upper Extremity Bathing: setup assistance stand by assistance. Equipment: none  Lower Extremity Bathing: moderate assistance. Equipment: none  Bathing Comments: would benefit from long handle bath sponge and shower seat at home  Upper Extremity Dressing: setup assistance minimal assistance. Equipment: none  Lower Extremity Dressing: . Comment: none completed, patient with catheter,patient would benefit from total hip kit to improve her independence in this due to abdominal incision . Equipment: reacher, long handled shoe horn, sock aide  Dressing Comments: Toileting: dependent. Equipment: none catheter in place at this time  Instrumental Activities of Daily Living  No IADL completed on this date. Functional Mobility    Bed Mobility  Supine to Sit: supervision  Comments:  Transfers  Sit to stand transfer:contact guard assistance  Toilet transfer: contact guard assistance. Mobility technique: ambulating. Equipment utilized: raised toilet seat with rails, grab bars  Comments:  Functional Mobility:  Functional Mobility: rolling walker. supervision.   Activity: to/from bathroom    Other Therapeutic Interventions      Functional Outcomes  AM-PAC Inpatient Daily Activity Raw Score: 14 Limited by catheter, IV and pain pump, physically will be able to improve function as IV and catheters removed       Cognition  WFL  Orientation:    alert and oriented x 4  Command Following:   [facility]     Education    Barriers To Learning: none  Patient Education: patient educated on goals, OT role and benefits, plan of care, ADL adaptive strategies, IADL safety  Learning Assessment:  patient verbalizes and demonstrates understanding    Assessment    Activity Tolerance:good  Impairments Requiring Therapeutic Intervention: decreased functional mobility, decreased ADL status, decreased

## 2022-06-29 NOTE — PROGRESS NOTES
[facility] [address]   Phone: [phone]    Physical Therapy    [x] Initial Evaluation            [] Daily Treatment Note         [] Discharge Summary      Patient: Michelle Sheikh   : 1945   MRN: [id number]   Date of Service:  2022  Admitting Diagnosis: Cecal volvulus [facility])  Current Admission Summary: 2022, exploratory laparotomy, detorsion of volvulus, right hemicolectomy with primary ileocolonic anastomosis for cecal volvulus  Past Medical History:  has a past medical history of Arthritis, Asthma, Atrial fibrillation ([address] Depression, Diverticulitis, DVT, GERD (gastroesophageal reflux disease), Hx of blood clots, Hypertension, Insomnia, Other disorders of kidney and ureter, Pneumonia, Shingles, and Thyroid disease. Past Surgical History:  has a past surgical history that includes Cholecystectomy, laparoscopic (03/10/2017); Cardiac surgery (); Cardioversion (2018); and hemicolectomy (N/A, 2022). Discharge Recommendations: Michelle Sheikh scored a 18/24 on the AM-PAC short mobility form. Current research shows that an AM-PAC score of 18 or greater is typically associated with a discharge to the patient's home setting. Based on the patient's AM-PAC score and their current functional mobility deficits, it is recommended that the patient have 2-3 sessions per week of Physical Therapy at d/c to increase the patient's independence. At this time, this patient demonstrates the endurance and safety to discharge home with HHPT and a follow up treatment frequency of 2-3x/wk. Please see assessment section for further patient specific details.     HOME HEALTH CARE: LEVEL 1 STANDARD  - Initial home health evaluation to occur within 24-48 hours, in patient home   - Therapy to evaluate with goal of regaining prior level of functioning   - Therapy to evaluate if patient has [id number] needs for personal care     If patient discharges prior to next session this note will serve as a discharge summary. Please see below for the latest assessment towards goals. DME Required For Discharge: rolling walker     Precautions/Restrictions: high fall risk, modified diet, . Comment: NPO  Weight Bearing Restrictions: no restrictions  [] Right Upper Extremity  [] Left Upper Extremity [] Right Lower Extremity  [] Left Lower Extremity     Required Braces/Orthotics: no braces required   [] Right  [] Left  Positional Restrictions:no positional restrictions    Pre-Admission Information   Lives With: alone (pt has friends nearby that can assist with needs as needed)    Type of Home: house  Home Layout: one level  Home Access: 1 step to enter without rails   Bathroom Layout: tub/shower unit  Toilet Height: standard height  Bathroom Equipment: grab bars in shower, hand held shower head  Home Equipment: single point cane  Transfer Assistance: Independent without use of device  Ambulation Assistance:Independent without use of device  ADL Assistance: independent with all ADL's  IADL Assistance: independent with homemaking tasks  Active :        [x] Yes  [] No  Current Employment: retired. Occupation: several jobs  Hobbies: swimming, going out with friends to eat, spend time with 12 y/o grandson  Recent Falls: Pt reports no recent falls. Examination   Vision:   Vision Gross Assessment: Impaired and Vision Corrective Device: wears glasses at all times  Hearing:   WFL  Posture:   good  Sensation:   WFL  Tone:   Normotonic  ROM:   (B) LE AROM WFL  Strength:   (B) LE strength grossly -4  Decision Making: low complexity  Clinical Presentation: stable      Subjective  General: Pt supine in bed upon arrival. Agreeable to PT/OT eval. Pt reporting intermittent spasms throughout abdomen while supine in bed. RN aware. Pain: 6/10.   Location: abdomen  Pain Interventions: pain medication in place prior to arrival and RN notified       Functional Mobility  Bed Mobility  Supine to Sit: stand by assistance  Rolling Left: stand by assistance  Scooting: stand by assistance  Comments:  Transfers  Sit to stand transfer: stand by assistance, contact guard assistance  Stand to sit transfer: stand by assistance, contact guard assistance  Comments: CGA>>>SBA for transfers; STS from EOB x1; x2 from toilet with use of grab bar  Ambulation  Surface:level surface  Assistive Device: rolling walker  Assistance: stand by assistance  Distance: 8'+8'  Gait Mechanics: slightly widened Yuniel, slightly decreased elizabeth  Comments:    Stair Mobility  Stair mobility not completed on this date. Comments:  Wheelchair Mobility:  No w/c mobility completed on this date. Comments:  Balance  Static Sitting Balance: good: independent with functional balance in unsupported position  Dynamic Sitting Balance: fair (+): maintains balance at SBA/supervision without use of UE support  Static Standing Balance: fair (-): maintains balance at SBA with use of UE support  Dynamic Standing Balance: fair (-): maintains balance at SBA with use of UE support  Comments: Pt sat at toilet ~10-15 minutes for ADLs with SBA for dynamic sitting balance. Pt stood with RW for UE support with Supervision/SBA for balance.     Other Therapeutic Interventions    Functional Outcomes  AM-PAC Inpatient Mobility Raw Score : 18              Cognition  WFL  Orientation:    alert and oriented x 4  Command Following:   [facility]    Education  Barriers To Learning: none  Patient Education: patient educated on goals, PT role and benefits, plan of care, general safety, functional mobility training, transfer training, discharge recommendations  Learning Assessment:  patient verbalizes and demonstrates understanding    Assessment  Activity Tolerance: limited by endurance  Impairments Requiring Therapeutic Intervention: decreased functional mobility, decreased strength, decreased endurance, decreased balance  Prognosis: good  Clinical Assessment: Pt presents with

## 2022-06-29 NOTE — PROGRESS NOTES
Incentive Spirometry education and demonstration completed by Respiratory Therapy Yes      Response to education: Excellent     Teaching Time: 20 minutes    Minimum Predicted Vital Capacity - 593 mL. Patient's Actual Vital Capacity - 750 mL. Turning over to Nursing for routine follow-up Yes.     Comments: IS goal met    Electronically signed by Elbert Craig RCP on 6/29/2022 at 5:59 PM

## 2022-06-30 LAB
ANION GAP SERPL CALCULATED.3IONS-SCNC: 8 MMOL/L (ref 3–16)
BUN BLDV-MCNC: 13 MG/DL (ref 7–20)
CALCIUM SERPL-MCNC: 8.9 MG/DL (ref 8.3–10.6)
CHLORIDE BLD-SCNC: 103 MMOL/L (ref 99–110)
CO2: 25 MMOL/L (ref 21–32)
CREAT SERPL-MCNC: 1.3 MG/DL (ref 0.6–1.2)
EKG ATRIAL RATE: 163 BPM
EKG DIAGNOSIS: NORMAL
EKG Q-T INTERVAL: 322 MS
EKG QRS DURATION: 88 MS
EKG QTC CALCULATION (BAZETT): 507 MS
EKG R AXIS: -28 DEGREES
EKG T AXIS: 51 DEGREES
EKG VENTRICULAR RATE: 149 BPM
GFR AFRICAN AMERICAN: 48
GFR NON-AFRICAN AMERICAN: 40
GLUCOSE BLD-MCNC: 130 MG/DL (ref 70–99)
HCT VFR BLD CALC: 36.2 % (ref 36–48)
HEMOGLOBIN: 12 G/DL (ref 12–16)
LEFT VENTRICULAR EJECTION FRACTION MODE: NORMAL
LV EF: 60 %
LV EF: 60 %
LVEF MODALITY: NORMAL
MAGNESIUM: 1.9 MG/DL (ref 1.8–2.4)
MAGNESIUM: 2 MG/DL (ref 1.8–2.4)
MAGNESIUM: 2.1 MG/DL (ref 1.8–2.4)
MCH RBC QN AUTO: 29.4 PG (ref 26–34)
MCHC RBC AUTO-ENTMCNC: 33.3 G/DL (ref 31–36)
MCV RBC AUTO: 88.3 FL (ref 80–100)
PDW BLD-RTO: 14.2 % (ref 12.4–15.4)
PLATELET # BLD: 169 K/UL (ref 135–450)
PMV BLD AUTO: 8.6 FL (ref 5–10.5)
POTASSIUM SERPL-SCNC: 4.1 MMOL/L (ref 3.5–5.1)
RBC # BLD: 4.1 M/UL (ref 4–5.2)
SODIUM BLD-SCNC: 136 MMOL/L (ref 136–145)
T4 FREE: 1.3 NG/DL (ref 0.9–1.8)
TSH REFLEX: 6.57 UIU/ML (ref 0.27–4.2)
WBC # BLD: 10.2 K/UL (ref 4–11)

## 2022-06-30 PROCEDURE — 2580000003 HC RX 258: Performed by: SURGERY

## 2022-06-30 PROCEDURE — 83735 ASSAY OF MAGNESIUM: CPT

## 2022-06-30 PROCEDURE — 99024 POSTOP FOLLOW-UP VISIT: CPT | Performed by: SURGERY

## 2022-06-30 PROCEDURE — 6370000000 HC RX 637 (ALT 250 FOR IP): Performed by: INTERNAL MEDICINE

## 2022-06-30 PROCEDURE — C9113 INJ PANTOPRAZOLE SODIUM, VIA: HCPCS | Performed by: INTERNAL MEDICINE

## 2022-06-30 PROCEDURE — 2500000003 HC RX 250 WO HCPCS: Performed by: INTERNAL MEDICINE

## 2022-06-30 PROCEDURE — 6360000002 HC RX W HCPCS: Performed by: SURGERY

## 2022-06-30 PROCEDURE — 2100000000 HC CCU R&B

## 2022-06-30 PROCEDURE — 36415 COLL VENOUS BLD VENIPUNCTURE: CPT

## 2022-06-30 PROCEDURE — 2580000003 HC RX 258: Performed by: INTERNAL MEDICINE

## 2022-06-30 PROCEDURE — 93306 TTE W/DOPPLER COMPLETE: CPT

## 2022-06-30 PROCEDURE — 93010 ELECTROCARDIOGRAM REPORT: CPT | Performed by: INTERNAL MEDICINE

## 2022-06-30 PROCEDURE — 99223 1ST HOSP IP/OBS HIGH 75: CPT | Performed by: INTERNAL MEDICINE

## 2022-06-30 PROCEDURE — 2000000000 HC ICU R&B

## 2022-06-30 PROCEDURE — 84443 ASSAY THYROID STIM HORMONE: CPT

## 2022-06-30 PROCEDURE — 84439 ASSAY OF FREE THYROXINE: CPT

## 2022-06-30 PROCEDURE — 6360000002 HC RX W HCPCS: Performed by: HOSPITALIST

## 2022-06-30 PROCEDURE — 93005 ELECTROCARDIOGRAM TRACING: CPT | Performed by: HOSPITALIST

## 2022-06-30 PROCEDURE — 80048 BASIC METABOLIC PNL TOTAL CA: CPT

## 2022-06-30 PROCEDURE — 6370000000 HC RX 637 (ALT 250 FOR IP): Performed by: SURGERY

## 2022-06-30 PROCEDURE — 6360000002 HC RX W HCPCS: Performed by: INTERNAL MEDICINE

## 2022-06-30 PROCEDURE — 6370000000 HC RX 637 (ALT 250 FOR IP): Performed by: NURSE PRACTITIONER

## 2022-06-30 PROCEDURE — 85027 COMPLETE CBC AUTOMATED: CPT

## 2022-06-30 RX ORDER — DILTIAZEM HCL/D5W 125 MG/125
2.5-15 PLASTIC BAG, INJECTION (ML) INTRAVENOUS CONTINUOUS
Status: DISCONTINUED | OUTPATIENT
Start: 2022-06-30 | End: 2022-07-05

## 2022-06-30 RX ORDER — DIGOXIN 0.25 MG/ML
125 INJECTION INTRAMUSCULAR; INTRAVENOUS ONCE
Status: COMPLETED | OUTPATIENT
Start: 2022-06-30 | End: 2022-06-30

## 2022-06-30 RX ORDER — AMIODARONE HYDROCHLORIDE 200 MG/1
200 TABLET ORAL DAILY
Status: DISCONTINUED | OUTPATIENT
Start: 2022-06-30 | End: 2022-07-09 | Stop reason: ALTCHOICE

## 2022-06-30 RX ORDER — FUROSEMIDE 10 MG/ML
20 INJECTION INTRAMUSCULAR; INTRAVENOUS ONCE
Status: COMPLETED | OUTPATIENT
Start: 2022-06-30 | End: 2022-06-30

## 2022-06-30 RX ADMIN — METOPROLOL TARTRATE 12.5 MG: 25 TABLET, FILM COATED ORAL at 20:44

## 2022-06-30 RX ADMIN — Medication 5 MG/HR: at 19:11

## 2022-06-30 RX ADMIN — OXYCODONE 5 MG: 5 TABLET ORAL at 13:12

## 2022-06-30 RX ADMIN — PANTOPRAZOLE SODIUM 40 MG: 40 INJECTION, POWDER, LYOPHILIZED, FOR SOLUTION INTRAVENOUS at 08:26

## 2022-06-30 RX ADMIN — METOPROLOL TARTRATE 2.5 MG: 5 INJECTION INTRAVENOUS at 05:20

## 2022-06-30 RX ADMIN — FUROSEMIDE 20 MG: 10 INJECTION, SOLUTION INTRAMUSCULAR; INTRAVENOUS at 13:12

## 2022-06-30 RX ADMIN — OXYCODONE 5 MG: 5 TABLET ORAL at 20:45

## 2022-06-30 RX ADMIN — PIPERACILLIN AND TAZOBACTAM 3375 MG: 3; .375 INJECTION, POWDER, LYOPHILIZED, FOR SOLUTION INTRAVENOUS at 05:21

## 2022-06-30 RX ADMIN — DIGOXIN 125 MCG: 250 INJECTION, SOLUTION INTRAMUSCULAR; INTRAVENOUS; PARENTERAL at 08:23

## 2022-06-30 RX ADMIN — Medication 10 ML: at 08:26

## 2022-06-30 RX ADMIN — PIPERACILLIN AND TAZOBACTAM 3375 MG: 3; .375 INJECTION, POWDER, LYOPHILIZED, FOR SOLUTION INTRAVENOUS at 13:15

## 2022-06-30 RX ADMIN — Medication 10 ML: at 20:45

## 2022-06-30 RX ADMIN — ENOXAPARIN SODIUM 40 MG: 100 INJECTION SUBCUTANEOUS at 20:44

## 2022-06-30 RX ADMIN — OXYCODONE 5 MG: 5 TABLET ORAL at 05:29

## 2022-06-30 RX ADMIN — METOPROLOL TARTRATE 2.5 MG: 5 INJECTION INTRAVENOUS at 15:18

## 2022-06-30 RX ADMIN — PIPERACILLIN AND TAZOBACTAM 3375 MG: 3; .375 INJECTION, POWDER, LYOPHILIZED, FOR SOLUTION INTRAVENOUS at 21:09

## 2022-06-30 RX ADMIN — AMIODARONE HYDROCHLORIDE 200 MG: 200 TABLET ORAL at 13:12

## 2022-06-30 ASSESSMENT — PAIN DESCRIPTION - LOCATION: LOCATION: ABDOMEN

## 2022-06-30 ASSESSMENT — PAIN DESCRIPTION - PAIN TYPE: TYPE: SURGICAL PAIN

## 2022-06-30 ASSESSMENT — PAIN SCALES - GENERAL
PAINLEVEL_OUTOF10: 10
PAINLEVEL_OUTOF10: 0
PAINLEVEL_OUTOF10: 2
PAINLEVEL_OUTOF10: 0
PAINLEVEL_OUTOF10: 3
PAINLEVEL_OUTOF10: 6

## 2022-06-30 ASSESSMENT — PAIN DESCRIPTION - DESCRIPTORS: DESCRIPTORS: ACHING

## 2022-06-30 ASSESSMENT — PAIN DESCRIPTION - ONSET: ONSET: ON-GOING

## 2022-06-30 ASSESSMENT — PAIN DESCRIPTION - FREQUENCY: FREQUENCY: CONTINUOUS

## 2022-06-30 NOTE — PROGRESS NOTES
[facility]    6/30/2022 7:56 AM        Name: Owen Preston . Admitted: 6/27/2022  Primary Care Provider: CARLIE Bennett CNP (Tel: [phone])                        Subjective:  . No acute events overnight. Resting well. Pain control. Diet ok. Labs reviewed  Denies any chest pain sob.      Reviewed interval ancillary notes    Current Medications  furosemide (LASIX) injection 20 mg, Once  digoxin (LANOXIN) injection 125 mcg, Once  diazePAM (VALIUM) injection 2.5 mg, Q8H PRN  magnesium sulfate 2000 mg in 50 mL IVPB premix, PRN  sodium phosphate 10 mmol in sodium chloride 0.9 % 250 mL IVPB, PRN   Or  sodium phosphate 15 mmol in sodium chloride 0.9 % 250 mL IVPB, PRN   Or  sodium phosphate 20 mmol in sodium chloride 0.9 % 500 mL IVPB, PRN  piperacillin-tazobactam (ZOSYN) 3,375 mg in dextrose 5 % 50 mL IVPB (mini-bag), Q8H  metoprolol (LOPRESSOR) injection 2.5 mg, Q6H  pantoprazole (PROTONIX) injection 40 mg, Daily  sodium chloride flush 0.9 % injection 5-40 mL, 2 times per day  sodium chloride flush 0.9 % injection 5-40 mL, PRN  0.9 % sodium chloride infusion, PRN  ondansetron (ZOFRAN-ODT) disintegrating tablet 4 mg, Q8H PRN   Or  ondansetron (ZOFRAN) injection 4 mg, Q6H PRN  polyethylene glycol (GLYCOLAX) packet 17 g, Daily PRN  acetaminophen (TYLENOL) tablet 650 mg, Q6H PRN   Or  acetaminophen (TYLENOL) suppository 650 mg, Q6H PRN  enoxaparin (LOVENOX) injection 40 mg, Nightly  acetaminophen (TYLENOL) tablet 1,000 mg, 3 times per day  oxyCODONE (ROXICODONE) immediate release tablet 5 mg, Q6H PRN  morphine (PF) injection 2 mg, Q2H PRN   Or  morphine injection 4 mg, Q2H PRN  dextrose 5 % and 0.45 % sodium chloride infusion, Continuous        Objective:  BP 95/71   Pulse (!) 115   Temp 97.7 °F (36.5 °C) (Oral)   Resp 16   Ht 5' 6\" (1.676 m)   Wt problems. *       Assessment & Plan:   1. Cecal volvulus  -Patient is status post surgery continue management per general surgery  Diet recommendation. Afib with RVR  -pt now in 140s 150,   - is on iv metoprolol  - will give dose digoxin. - follows cardiology will consult       chf  - she is getting volume overload  - will give iv lasix x1. Depending on response and BP we may use additional diurectics.     GERD PPI      Diet: Diet NPO Exceptions are: Ice Chips, Sips of Water with Meds, Popsicles  Code:Full Code  DVT PPX lovenox       Giorgi Mccormick MD   6/30/2022 7:56 AM

## 2022-06-30 NOTE — PROGRESS NOTES
STAT EKG ordered for tachycardia, rate 120s-170s, up to 200 at one moment. RT called to come complete. Gabriel Aldana RN informed at patient's bedside.

## 2022-06-30 NOTE — PROGRESS NOTES
Pt heart rate up to the 150's at times. Per MD order pt given 125mcg ov Digoxin. Cardiology consulted, will continue to monitor.

## 2022-06-30 NOTE — PROGRESS NOTES
[facility]   Electrophysiology Progress Note     Date: 7/1/2022  Admit Date: 6/27/2022     Reason for consultation: Atrial fibrillation    Chief Complaint:   Chief Complaint   Patient presents with    Flank Pain     right and left flank pain since this am with emesis        History of Present Illness: History obtained from patient and medical record. Snow Spear is a 65 y.o. female with a past medical history of atrial fibrillation, DVT, HTN, and hypothyroidism. Hx of multiple cardioversions and MAZE procedure and DASHAWN closure (2011) confirmed by KAROLINA. Hx of RFCA with PVI, left sided atrial flutter with CTI dependent flutter, roof line, anterior of the LA, and CAFE (2/21/19)    Pt presented to hospital with abdominal pain. A CT of her abdomen showed cecal volvulus with distended cecum with mesenteric edema and pelvis ascites. S/p exploratory laparotomy, detorsion of volvulus, right hemicolectomy with primary ileocolonic anastomosis (6/28/22). Pt developed atrial fibrillation with RVR on telemetry. She feels heart racing and fluttering in her chest. She states she has felt well the last few years and stopped coming into the office due to covid. Interval Hx: Today, she is being seen for EP follow up. She had recurrent AF RVR last evening and was started on cardizem gtt with subsequent conversion to sinus. She remains in sinus rhythm this morning and feels much better. Her BP is stable. We discussed her echo results, which she was relieved to hear, as her EF has improved. She is still wanting to start liquids. She has never been checked for sleep apnea. Patient seen and examined. Clinical notes reviewed. Telemetry reviewed. No new complaints today. No major events overnight. Denies having chest pain, palpitations, shortness of breath, orthopnea/PND, cough, or dizziness at the time of this visit. Allergies:   Allergies   Allergen Reactions    Benadryl [Diphenhydramine]      Accelerated heart rate     Home Meds:  Prior to Visit Medications    Medication Sig Taking? Authorizing Provider   sertraline (ZOLOFT) 50 MG tablet Take 1 tablet by mouth daily  Patient taking differently: Take 25 mg by mouth daily   CARLIE Gross CNP   lisinopril (PRINIVIL;ZESTRIL) 20 MG tablet Take 1 tablet by mouth daily  Patient taking differently: Take 10 mg by mouth daily   CARLIE Gross CNP   metoprolol tartrate (LOPRESSOR) 50 MG tablet Take 1 tablet by mouth daily  Patient taking differently: Take 25 mg by mouth daily   CARLIE Gross CNP   furosemide (LASIX) 40 MG tablet TAKE 1 TABLET DAILY  CARLIE Gross CNP   amitriptyline (ELAVIL) 25 MG tablet Take 1 tablet by mouth nightly  CARLIE Gross CNP   levothyroxine (SYNTHROID) 112 MCG tablet Take 1 tablet by mouth Daily  CARLIE Gross CNP   pantoprazole (PROTONIX) 20 MG tablet Take 1 tablet by mouth every morning (before breakfast)  Patient not taking: Reported on 6/28/2022  CARLIE Gross CNP   albuterol sulfate HFA (VENTOLIN HFA) 108 (90 Base) MCG/ACT inhaler Inhale 2 puffs into the lungs 4 times daily as needed for Wheezing  CARLIE Gross CNP   clonazePAM (KLONOPIN) 1 MG tablet Take 1 tablet by mouth nightly as needed for Anxiety for up to 90 days.   CARLIE Gross CNP      Scheduled Meds:   amiodarone bolus  300 mg IntraVENous Once    amiodarone  200 mg Oral Daily    metoprolol tartrate  12.5 mg Oral BID    piperacillin-tazobactam  3,375 mg IntraVENous Q8H    pantoprazole  40 mg IntraVENous Daily    sodium chloride flush  5-40 mL IntraVENous 2 times per day    enoxaparin  40 mg SubCUTAneous Nightly     Continuous Infusions:   dilTIAZem Stopped (07/01/22 0700)    sodium chloride       PRN Meds:diazePAM, magnesium sulfate, sodium phosphate IVPB **OR** sodium phosphate IVPB **OR** sodium phosphate IVPB, sodium chloride flush, sodium chloride, ondansetron **OR** ondansetron, polyethylene glycol, acetaminophen **OR** acetaminophen, oxyCODONE, morphine **OR** morphine     Past Medical History:  Past Medical History:   Diagnosis Date    [facility]acute [facility]) ([facility] [address]     Arthritis     Asthma     Atrial fibrillation ([facility] [address] 8/9/2011    Depression     Diverticulitis     DVT     post partum    GERD (gastroesophageal reflux disease)     Hx of blood clots     leg    Hypertension     Insomnia     Other disorders of kidney and ureter     hypothyroid    Pneumonia 11/7/2011    Shingles     Thyroid disease       Past Surgical History:    has a past surgical history that includes Cholecystectomy, laparoscopic (03/10/2017); Cardiac surgery (2012); Cardioversion (09/2018); and hemicolectomy (N/A, 6/27/2022). Social History:  Reviewed. reports that she has never smoked. She has never used smokeless tobacco. She reports current alcohol use. She reports that she does not use drugs. Family History:  Reviewed. family history includes Arthritis in her brother; Cancer in her brother; Depression in her brother; Diabetes in her brother; High Blood Pressure in her brother and brother; Lupus in her sister and sister. Review of Systems:  · Constitutional: Negative for fever, night sweats, chills, weight changes, or weakness  · Skin: Negative for rash, dry skin, pruritus, bruising, bleeding, blood clots, or changes in skin pigment  · HEENT: Negative for vision changes, ringing in the ears, sore throat, dysphagia, or swollen lymph nodes  · Respiratory: Reviewed in HPI  · Cardiovascular: Reviewed in HPI  · Gastrointestinal: Negative for abdominal pain, N/V/D, constipation, or black/tarry stools  · Genito-Urinary: Negative for dysuria, incontinence, urgency, or hematuria  · Musculoskeletal: Negative for joint swelling, muscle pain, or injuries  · Neurological/Psych: Negative for confusion, seizures, headaches, balance issues or TIA-like symptoms.  No anxiety, depression, or insomnia    Physical Examination:  Vitals:    07/01/22 0858   BP:    Pulse:    Resp: 16   Temp:    SpO2: 96%      In: 252.2 [I.V.:19.2]  Out: 600    Wt Readings from Last 3 Encounters:   07/01/22 253 lb 12 oz (115.1 kg)   03/24/22 254 lb 12.8 oz (115.6 kg)   09/28/21 250 lb (113.4 kg)       Intake/Output Summary (Last 24 hours) at 7/1/2022 1035  Last data filed at 7/1/2022 0509  Gross per 24 hour   Intake 252.17 ml   Output 600 ml   Net -347.83 ml       Telemetry: Personally Reviewed  - Sinus rhythm. PAF overnight (episode from 1835 to 1950, then brief episodes of AF with RVR)  · Constitutional: Cooperative and in no apparent distress, and appears well nourished  · Skin: Warm and pink; no pallor, cyanosis, bruising, or clubbing  · HEENT: Symmetric and normocephalic. PERRL, EOM intact. Conjunctiva pink with clear sclera. Mucus membranes pink and moist. Teeth intact. Thyroid smooth without nodules or goiter. · Cardiovascular: Regular rate and rhythm. S1/S2 present without murmurs, rubs, or gallops. Peripheral pulses 2+, capillary refill < 3 seconds. No elevation of JVP. No peripheral edema  · Respiratory: Respirations symmetric and unlabored. Lungs clear to auscultation bilaterally, no wheezing, crackles, or rhonchi  · Gastrointestinal: Abdomen soft and round. Bowel sounds normoactive in all quadrants without tenderness or masses. + obese  · Musculoskeletal: Bilateral upper and lower extremity strength 5/5 with full ROM  · Neurologic/Psych: Awake and orientated to person, place and time.  Calm affect, appropriate mood    Pertinent labs, diagnostic, device, and imaging results reviewed as a part of this visit    Labs:    BMP:   Recent Labs     06/28/22  1240 06/29/22  0649 06/29/22  0950 06/30/22  0155 06/30/22  0821   NA  --   --  137 136  --    K 5.2*  --  4.6 4.1  --    CL  --   --  104 103  --    CO2  --   --  23 25  --    BUN  --   --  16 13  --    CREATININE  --   --  1.2 1.3*  --    MG  --  1.90  --  2.00 2.10 Estimated Creatinine Clearance: 46 mL/min (A) (based on SCr of 1.3 mg/dL (H)). CBC:   Recent Labs     22  0950 22  0155   WBC 10.3 10.2   HGB 12.3 12.0   HCT 38.1 36.2   MCV 88.5 88.3    169     Thyroid:   Lab Results   Component Value Date/Time    TSH 3.96 2018 02:38 PM     Lipids:   Lab Results   Component Value Date/Time    CHOL 93 2011 04:28 AM    HDL 40 2019 09:48 AM    HDL 29 2011 04:28 AM    TRIG 76 2011 04:28 AM     LFTS:   Lab Results   Component Value Date/Time    ALT 38 2022 08:57 PM    AST 33 2022 08:57 PM    ALKPHOS 81 2022 08:57 PM    PROT 7.8 2022 08:57 PM    PROT 7.0 2011 03:56 PM    AGRATIO 1.4 2022 08:57 PM    BILITOT 0.5 2022 08:57 PM     Cardiac Enzymes:   Lab Results   Component Value Date/Time    TROPONINI <0.01 2022 08:57 PM    TROPONINI <0.01 2018 08:29 PM     Coags:   Lab Results   Component Value Date/Time    PROTIME 11.8 2019 07:10 AM    INR 1.04 2019 07:10 AM     EC22  Atrial fibrillation with RVR     KAROLINA:     Ejection fraction is visually estimated to be 40-45%.   The left atrium is moderately dilated. DASHAWN is removed.     Echo: 22 (reviewed and discussed with pt)   Left ventricular systolic function is normal with ejection fraction estimated at 60 %. Irregular rhythm. Normal left ventricular wall thickness. Diastolic dysfunction grade and filing pressure are indeterminate. The right atrium is mildly dilated. The right ventricle is moderately enlarged. Right ventricular systolic function is moderately reduced . Hypokinetic free wall of the right ventricle. The aortic root is mildly dilated. The ascending aorta is mildly dilated. Aortic valve appears sclerotic but opens adequately. Mild mitral and pulmonic regurgitation. Moderate tricuspid regurgitation.    Systolic pulmonary artery pressure (SPAP) estimated at 52 mmHg (right atrial pressure 8 mmHg), consistent with moderate pulmonary hypertension. Stress Test: None     CT Abd/Pelvis: 6/27/22  1.  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 Associated mesenteric edema and trace abdominopelvic ascites.  No pneumatosis or gross perforation.  Findings were discussed with Ramesh Rosenberg at 9:58 pm on 6/27/2022. 2.  Incidental 1.8 cm indeterminate left renal lesion for which nonemergent follow-up with contrast-enhanced CT or MRI renal mass protocol is recommended. 3.  Morphologic findings suggestive of chronic liver disease. 4.  Additional chronic and benign findings, as above. Problem List:   Patient Active Problem List    Diagnosis Date Noted    Dilated cardiomyopathy ([address]     Episodic cluster headache, not intractable     SOB (shortness of breath)     Obesity (BMI 30-39. 9)     LV dysfunction     AMPARO (acute kidney injury) ([address]     Cecal volvulus ([address] 06/27/2022    Morbid obesity due to excess calories ([address] 07/01/2020    PAF (paroxysmal atrial fibrillation) ([address] 02/18/2019    Atypical atrial flutter (HCC)     Typical atrial flutter (HCC)     Chronic combined systolic (congestive) and diastolic (congestive) heart failure ([address] 09/13/2018    Benign essential HTN 08/09/2018    Severe single current episode of major depressive disorder, without psychotic features ([address] 08/09/2018    Chronic anxiety 08/09/2018    Chronic congestive heart failure ([address] 08/09/2018    Herpes zoster without complication     Cholecystitis     Acute calculous cholecystitis     Knee osteoarthritis 08/05/2014    Acquired hypothyroidism 08/10/2011    GERD (gastroesophageal reflux disease) 08/10/2011    Diverticulosis 08/10/2011    Depression 08/10/2011    Atrial fibrillation ([address] 08/10/2011        Assessment and Plan:     1. Persistent Atrial Fibrillation  - Hx of numerous cardioversion.  Hx of RFCA with PVI, left sided atrial flutter with CTI dependent flutter, roof line, anterior of the LA, and CAFE (2/21/19)    - Currently in NSR   ~ Brief episodes of AF RVR overnight prior to starting of BB therapy. She could still have brief episodes of RVR in setting of post op surgical recovery, but no significant changes to treatment needed unless she stays in AF with RVR for prolonged durations. Continue PO amiodarone 200 mg daily. No need to prescribe at d/c per Dr. Janice Andino. May give IV amio boluses if she has recurrent episodes prior to d/c    - Continue metoprolol 12.5 mg BID (unable to titrate up as HR is borderline on current dose)  ~ No need for anti-coagulation s/p DASHAWN ligation confirmed on KAROLINA    - Afib risk factors including age, HTN, obesity, inactivity and ANGELIKA were discussed with patient. Risk factor modification recommended    ~ TSH 6.57 (6/22); needs thyroid adjustment     - Will do monitor at follow up to assess for recurrent atrial fibrillation and determine long term plan to see if repeat ablation needed    2. HTN  - Controlled: Goal <130/80  - Continue current medications    3. Chronic systolic heart failure (NYHA Class II)  - Appears compensated   ~ EF 60% per echo yesterday; previously low  - Continue with BB, ACE-I  - Aggressive medical therapy with risk factor modification  - Discussed with patient importance of monitoring weight, low sodium diet and fluid restriction    4. Hypothyroidism    - TSH elevated   - Needs synthroid dose adjusted as this may be contributing to AF episodes    5. Tricuspid Regurgitation, At Risk for Sleep Apnea  - Multiple risk factors for sleep apnea, including atrial fibrillation  - Discussed long term effects of untreated ANGELIKA  - Referral to sleep study center (will arrange for her to see sleep medicine)    6. Volvulus   - S/p exploratory laparotomy, detorsion of volvulus, right hemicolectomy with primary ileocolonic anastomosis (6/28/22)   - Management per surgery    No further EP recommendation. Will follow up in office in 2 months with Dr. Janice Andino.  EP will sign off. Please call if there are any questions. Thank you for consult. All pertinent information and plan of care discussed with the EP physician. All questions and concerns were addressed to the patient. Alternatives to my treatment were discussed. I have discussed the above stated plan with patient and the nurse. The patient verbalized understanding and agreed with the plan. Thank you for allowing to us to participate in the care of Saran Green    The patient was seen for >35 minutes. I reviewed interval history, physical exam, review of data including labs, imaging, development and implementation of treatment plan and coordination of complex care.  More than 50% of the time was devoted to counseling the patient on their diagnoses/treatments, as well as coordination of care with the other care teams    Craige Holstein, APRN-CNP  [address]   Office: [phone]

## 2022-06-30 NOTE — CONSULTS
[facility]   Electrophysiology Consultation   Date: 6/30/2022  Reason for Consultation: Paroxysmal atrial fibrillation  Consult Requesting Physician: Destiny Porras, *     Chief Complaint   Patient presents with    Flank Pain     right and left flank pain since this am with emesis      HPI: Bhavna Nowak is a 65 y.o. female presented to hospital with abdominal pain. A CT of her abdomen showed cecal volvulus with distended cecum with mesenteric edema and pelvis ascites. S/p exploratory laparotomy, detorsion of volvulus, right hemicolectomy with primary ileocolonic anastomosis (6/28/22). Pt developed atrial fibrillation with RVR. She feels heart racing and fluttering in her chest. She states she has felt well the last few years and stopped coming into the office due to covid. She has past medical history of atrial fibrillation status post PVI and maze and left atrial appendage closure in 2011. She has had also ablation of left-sided atrial flutter and CTI dependent atrial flutter in 2019. She also has history of hypertension, DVT and hypothyroidism. Past Medical History:   Diagnosis Date    Arthritis     Asthma     Atrial fibrillation ([address] 8/9/2011    Depression     Diverticulitis     DVT     post partum    GERD (gastroesophageal reflux disease)     Hx of blood clots     leg    Hypertension     Insomnia     Other disorders of kidney and ureter     hypothyroid    Pneumonia 11/7/2011    Shingles     Thyroid disease         Past Surgical History:   Procedure Laterality Date    CARDIAC SURGERY  2012    minimaze    CARDIOVERSION  09/2018    CHOLECYSTECTOMY, LAPAROSCOPIC  03/10/2017    with gram     HEMICOLECTOMY N/A 6/27/2022    EXPLORATORY LAPAROTOMY, RIGHT HEMICOLECTOMY performed by Destiny Porras MD at [address]   Allergen Reactions    Benadryl [Diphenhydramine]      Accelerated heart rate       Social History:  Reviewed.   reports that she has never smoked. She has never used smokeless tobacco. She reports current alcohol use. She reports that she does not use drugs. Family History:  Reviewed. family history includes Arthritis in her brother; Cancer in her brother; Depression in her brother; Diabetes in her brother; High Blood Pressure in her brother and brother; Lupus in her sister and sister. Review of System:  All other systems reviewed and are negative except for that noted above. Pertinent negatives are:     · General: negative for fever, chills   · Ophthalmic ROS: negative for - eye pain or loss of vision  · ENT ROS: negative for - headaches, sore throat   · Respiratory: negative for - cough, sputum  · Cardiovascular: Reviewed in HPI  · Gastrointestinal: negative for - abdominal pain, diarrhea, N/V  · Hematology: negative for - bleeding, blood clots, bruising or jaundice  · Genito-Urinary:  negative for - Dysuria or incontinence  · Musculoskeletal: negative for - Joint swelling, muscle pain  · Neurological: negative for - confusion, dizziness, headaches   · Psychiatric: No anxiety, no depression. · Dermatological: negative for - rash    Physical Examination:  Vitals:    22 1016   BP:    Pulse: 76   Resp: 22   Temp:    SpO2:       In: 1434 [P.O.:240; I.V.:1090]  Out: 1300    Wt Readings from Last 3 Encounters:   22 265 lb 3.2 oz (120.3 kg)   22 254 lb 12.8 oz (115.6 kg)   21 250 lb (113.4 kg)     Temp  Av.9 °F (36.6 °C)  Min: 97.2 °F (36.2 °C)  Max: 98.1 °F (36.7 °C)  Pulse  Av.7  Min: 68  Max: 137  BP  Min: 90/64  Max: 124/76  SpO2  Av.8 %  Min: 90 %  Max: 97 %    Intake/Output Summary (Last 24 hours) at 2022 1224  Last data filed at 2022 0550  Gross per 24 hour   Intake 1394 ml   Output 1300 ml   Net 94 ml       · Telemetry: Sinus rhythm, runs of atrial  fibrillation  · Constitutional: Oriented. No distress. · Head: Normocephalic and atraumatic.    · Mouth/Throat: Oropharynx is clear and moist. · Eyes: Conjunctivae normal. EOM are normal.   · Neck: Neck supple. No rigidity. No JVD present. · Cardiovascular: Normal rate, regular rhythm, S1&S2. · Pulmonary/Chest: Bilateral respiratory sounds. No wheezes, No rhonchi. · Abdominal: Soft. Bowel sounds present. No distension, No tenderness. Postop  · Musculoskeletal: No tenderness. No edema    · Lymphadenopathy: Has no cervical adenopathy. · Neurological: Alert and oriented. Cranial nerve appears intact, No Gross deficit   · Skin: Skin is warm and dry. No rash noted. · Psychiatric: Has a normal behavior     Labs, diagnostic and imaging results reviewed. Reviewed. Recent Labs     22  0600 22  1240 22  0950 22  0155   NA  --  137  --  137 136   K   < > 5.5* 5.2* 4.6 4.1   CL  --  103  --  104 103   CO2  --  25  --  23 25   BUN  --  15  --  16 13   CREATININE  --  1.0  --  1.2 1.3*    < > = values in this interval not displayed. Recent Labs     22  0600 22  0950 22  0155   WBC 12.8* 10.3 10.2   HGB 12.9 12.3 12.0   HCT 39.4 38.1 36.2   MCV 88.9 88.5 88.3    147 169     Lab Results   Component Value Date/Time    TROPONINI <0.01 2022 08:57 PM     No results found for: BNP  Lab Results   Component Value Date/Time    PROTIME 11.8 2019 07:10 AM    PROTIME 21.1 2018 08:29 PM    PROTIME 13.1 2011 05:00 PM    INR 1.04 2019 07:10 AM    INR 1.87 2018 08:29 PM    INR 1.19 2011 05:00 PM     Lab Results   Component Value Date/Time    CHOL 93 2011 04:28 AM    HDL 40 2019 09:48 AM    HDL 29 2011 04:28 AM    TRIG 76 2011 04:28 AM       EC22  Atrial fibrillation with RVR     KAROLINA:     Ejection fraction is visually estimated to be 40-45 %.   The left atrium is moderately dilated. DASHAWN is removed.     Stress Test: None     CT Abd/Pelvis: 22  1.  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 Associated mesenteric edema and trace abdominopelvic ascites.  No pneumatosis   or gross perforation.  Findings were discussed with BOOKER SHIRLEY at 9:58 pm   on 6/27/2022.       2.  Incidental 1.8 cm indeterminate left renal lesion for which nonemergent   follow-up with contrast-enhanced CT or MRI renal mass protocol is recommended.       3.  Morphologic findings suggestive of chronic liver disease.       4.  Additional chronic and benign findings, as above.              Scheduled Meds:   furosemide  20 mg IntraVENous Once    amiodarone bolus  300 mg IntraVENous Once    piperacillin-tazobactam  3,375 mg IntraVENous Q8H    metoprolol  2.5 mg IntraVENous Q6H    pantoprazole  40 mg IntraVENous Daily    sodium chloride flush  5-40 mL IntraVENous 2 times per day    enoxaparin  40 mg SubCUTAneous Nightly    acetaminophen  1,000 mg Oral 3 times per day     Continuous Infusions:   sodium chloride       PRN Meds:.diazePAM, magnesium sulfate, sodium phosphate IVPB **OR** sodium phosphate IVPB **OR** sodium phosphate IVPB, sodium chloride flush, sodium chloride, ondansetron **OR** ondansetron, polyethylene glycol, acetaminophen **OR** acetaminophen, oxyCODONE, morphine **OR** morphine     Patient Active Problem List    Diagnosis Date Noted    Dilated cardiomyopathy [facility]     Episodic cluster headache, not intractable     SOB (shortness of breath)     Obesity (BMI 30-39. 9)     Cecal volvulus ([facility] 06/27/2022    Cholecystitis     Acute calculous cholecystitis     Knee osteoarthritis 08/05/2014    Acquired hypothyroidism 08/10/2011    GERD (gastroesophageal reflux disease) 08/10/2011    Diverticulosis 08/10/2011    Depression 08/10/2011    Atrial fibrillation ([address] 08/10/2011    Morbid obesity due to excess calories (HCC) 07/01/2020    PAF (paroxysmal atrial fibrillation) ([facility] 02/18/2019    Atypical atrial flutter (HCC)     Typical atrial flutter (HCC)     Chronic combined systolic (congestive) and diastolic (congestive) heart failure ([facility] 09/13/2018    Benign essential HTN 08/09/2018    Severe single current episode of major depressive disorder, without psychotic features ([facility] 08/09/2018    Chronic anxiety 08/09/2018    Chronic congestive heart failure ([facility] 08/09/2018    Herpes zoster without complication       Active Hospital Problems    Diagnosis Date Noted    Cecal volvulus ([place] [address] [K56.2] 06/27/2022     Priority: Medium    GERD (gastroesophageal reflux disease) [K21.9] 08/10/2011     Priority: Low    Atrial fibrillation ([place] [address] [I48.91] 08/10/2011     Priority: Low    Acquired hypothyroidism [E03.9] 08/10/2011     Priority: Low    Chronic combined systolic (congestive) and diastolic (congestive) heart failure ([facility] [I50.42] 09/13/2018       Assessment:       Plan:    -Paroxysmal atrial fibrillation    Patient has been generally doing very well without any significant symptomatic A. fib since her last ablation. She has been having short runs of atrial fibrillation since her surgery. This could be related to postop period. I initially plan to give her 300 mg of IV amiodarone to help suppress these episodes. Patient refused due to risk of side effects. I explained to her that the short-term use of amiodarone is generally benign and without any significant adverse events. She agreed to take amiodarone orally. I will start her on 200 mg daily and in addition to that I explained to her that we may give her IV boluses if she has more recurrences while the oral medication is at work. She does not need any continuation of amiodarone at discharge. I will have her follow-up with me in 2 months after surgery and at that time we will do a monitor to assess for any burden of A. fib. -Hypertension  BP is well controlled. Continue current meds. -LV dysfunction    Last echo in 2019 showed an EF of 40 to 45%. She is not on medical therapy.   I will do an echocardiogram to reassess the ejection fraction. Further management will depend on findings. I agree with diuresis as tolerated. monitor intake and output and renal function. Monitor and correct electrolytes. -AMPARO    Creatinine has increased to 1.3 perhaps due to diuresis. We will continue to monitor.     - volvulus  Status post bowel resection. Patient is doing well. Thank you for allowing me to participate in the care of Ana Watson       She also has history of hypothyroidism. I will check her TSH. NOTE: This report was transcribed using voice recognition software. Every effort was made to ensure accuracy, however, inadvertent computerized transcription errors may be present.

## 2022-06-30 NOTE — PROGRESS NOTES
[address]   Electrophysiology Nurse Practitioner  Pre-Consult Rounding    Date: 2022  Date of admission: 2022  8:10 PM  Reason for Admission: Septicemia [facility]) [A41.9]  Cecal volvulus [facility]) [K56.2]  Consult Requesting Physician: Kanchan Melton, *     Bhavna Nowak is a 65 y.o. female presented to hospital with abdominal pain. A CT of her abdomen showed cecal volvulus with distended cecum with mesenteric edema and pelvis ascites. S/p exploratory laparotomy, detorsion of volvulus, right hemicolectomy with primary ileocolonic anastomosis (22). Pt developed atrial fibrillation with RVR. She feels heart racing and fluttering in her chest. She states she has felt well the last few years and stopped coming into the office due to covid. Past medical history: Atrial fibrillation, DVT, HTN, and hypothyroidism. Hx of multiple cardioversions and MAZE procedure and DASHAWN closure () confirmed by KAROLINA. Hx of RFCA with PVI, left sided atrial flutter with CTI dependent flutter, roof line, anterior of the LA, and CAFE (19)    Telemetry: Atrial fibrillation    EC22  Atrial fibrillation with RVR    KAROLINA:     Ejection fraction is visually estimated to be 40-45 %. The left atrium is moderately dilated. DASHAWN is removed. Stress Test: None    CT Abd/Pelvis: 22  1.  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 Associated mesenteric edema and trace abdominopelvic ascites.  No pneumatosis   or gross perforation.  Findings were discussed with BOOKER SHIRLEY at 9:58 pm   on 2022.       2.  Incidental 1.8 cm indeterminate left renal lesion for which nonemergent   follow-up with contrast-enhanced CT or MRI renal mass protocol is recommended.       3.  Morphologic findings suggestive of chronic liver disease.       4.  Additional chronic and benign findings, as above.      Lab Results   Component Value Date    LVEF 43 2019    LVEFMODE Echo 2018 Lab Results   Component Value Date    TSHFT4 5.76 (H) 07/01/2020    TSH 3.96 03/13/2018       Plan:     1. Persistent Atrial Fibrillation  - Hx of numerous cardioversion. Hx of RFCA with PVI, left sided atrial flutter with CTI dependent flutter, roof line, anterior of the LA, and CAFE (2/21/19)  - Pt is flipping between NSR and atrial fibrillation with RVR  - Discussed with Dr. Ronaldo Chauhan: will give 300 mg of IV amiodarone bolus now  ~ Not on anti-coagulation due to DASHAWN closure confirmed on KAROLINA    2. LV Dysfunction  - Appears decompensated   ~ EF 40-45% per KAROLINA (2019)  - Off OMT  - Stop IVF. Agree with dose of IV lasix  - Monitor I&O's, Daily weights    3. HTN  - BP on soft side  - Hold BP meds    Will discuss the plan for EP attending. Full consult note to follow.

## 2022-06-30 NOTE — PROGRESS NOTES
[facility]        Progress Note    Patient Name: Bhavna Nowak  MRN: [id number]  YOB: 1945  Date of Evaluation: 2022    Subjective:  Has a fib  Pain controlled with Toradol, reports intermittent \"muscle spasms\"  No nausea or vomiting, but no BM yet. Tolerating NGT out  Resting in bed at this time    Post-Operative Day # 3      Vital Signs:  Patient Vitals for the past 24 hrs:   BP Temp Temp src Pulse Resp SpO2 Weight   22 1016 -- -- -- 76 22 -- --   22 0814 93/64 97.2 °F (36.2 °C) Temporal (!) 120 16 -- --   22 0722 -- -- -- (!) 115 -- 93 % --   22 0650 95/71 -- -- 98 -- -- --   22 0648 101/68 -- -- -- -- -- --   22 0630 90/64 -- -- 81 -- -- --   22 0600 98/61 -- -- (!) 137 -- -- --   22 0529 114/71 -- -- (!) 101 -- -- --   22 0519 -- -- -- -- -- -- 265 lb 3.2 oz (120.3 kg)   22 0453 101/68 97.7 °F (36.5 °C) Oral 78 16 93 % --   22 0049 112/75 98.1 °F (36.7 °C) Oral 86 16 90 % --   22 1945 124/76 98.1 °F (36.7 °C) Oral 91 16 97 % --   22 1733 -- -- -- -- 18 -- --   22 1715 115/67 98.1 °F (36.7 °C) Oral 68 18 -- --   22 1703 -- -- -- -- 18 90 % --   22 1436 -- -- -- -- 16 -- --   22 1406 -- -- -- -- 18 -- --   22 1311 -- -- -- -- 18 -- --   22 1230 (!) 111/57 98.1 °F (36.7 °C) Oral 73 18 94 % --      TEMPERATURE HISTORY 24H: Temp (24hrs), Av.9 °F (36.6 °C), Min:97.2 °F (36.2 °C), Max:98.1 °F (36.7 °C)    BLOOD PRESSURE HISTORY: Systolic (19TWV), IZJ:021 , Min:90 , DMV:095    Diastolic (58FHS), CHK:84, Min:57, Max:76      Intake/Output:  I/O last 3 completed shifts: In: 4483 [P.O.:240; I.V.:2096; IV Piggyback:104]  Out: 2290 [Urine:2290]  No intake/output data recorded.   Drain/tube Output:       Physical Exam:  General: awake, alert, oriented to person, place, time  Lungs: unlabored respirations  Abdomen: soft, mildly distended, 30 Days  Result Component Current Result Ref Range Previous Result Ref Range   Culture, Blood 2 No Growth to date. Any change in status will be called. (6/27/2022)  Not in Time Range      Fecal occult  No results found for requested labs within last 30 days. GI bacterial pathogens by PCR  No results found for requested labs within last 30 days. C. difficile  No results found for requested labs within last 30 days. Urine culture  Lab Results   Component Value Date/Time    LABURIN >100,000 CFU/ml 03/24/2022 04:07 PM       Pathology:  OR 6/27/2022--FINAL DIAGNOSIS:     Colon and terminal ileum, right hemicolectomy:   -Congestion of colonic wall consistent with history of volvulus.   -Histologically unremarkable appendix.   -Negative for malignancy. Imaging:  I have personally reviewed the following films:    CT ABDOMEN PELVIS WO CONTRAST Additional Contrast? None    Result Date: 6/27/2022  EXAMINATION: CT OF THE ABDOMEN AND PELVIS WITHOUT CONTRAST 6/27/2022 8:43 pm TECHNIQUE: CT of the abdomen and pelvis was performed without the administration of intravenous contrast. Multiplanar reformatted images are provided for review. Automated exposure control, iterative reconstruction, and/or weight based adjustment of the mA/kV was utilized to reduce the radiation dose to as low as reasonably achievable. COMPARISON: None. HISTORY: ORDERING SYSTEM PROVIDED HISTORY: abd pain n/v TECHNOLOGIST PROVIDED HISTORY: Reason for exam:->abd pain n/v Additional Contrast?->None Decision Support Exception - unselect if not a suspected or confirmed emergency medical condition->Emergency Medical Condition (MA) Reason for Exam: abd pain n/v FINDINGS: Lower Chest: Scarring in the anterior right middle lobe and lingula. No acute findings. Small hiatal hernia.  Organs: Evaluation of the abdominal and pelvic viscera is limited in the absence of contrast.  Subtle lobulated liver contour and widening of the fissural spaces suggestive of chronic liver disease. Cholecystectomy. The pancreas, spleen, adrenals and kidneys reveal no acute findings. There is an indeterminate 1.8 cm lesion arising from the posterosuperior pole the left kidney. GI/Bowel: Findings compatible cecal volvulus, with the dilated cecum located in the left upper quadrant measuring up to 10 cm in greatest dimension. Associated twisting of the mesentery and transition point on axial image 155. No small bowel distension. Mesenteric edema is noted. No pneumatosis or gross perforation identified. Distal colonic diverticulosis. Pelvis: No acute findings. Peritoneum/Retroperitoneum: Trace abdominopelvic ascites. No free air. No lymphadenopathy. Bones/Soft Tissues: No acute osseous abnormality identified. Multilevel degenerative disc disease and facet arthropathy with degenerative grade 1 anterolisthesis of L4.     1.  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 Associated mesenteric edema and trace abdominopelvic ascites. No pneumatosis or gross perforation. Findings were discussed with Ron Mathews at 9:58 pm on 6/27/2022. 2.  Incidental 1.8 cm indeterminate left renal lesion for which nonemergent follow-up with contrast-enhanced CT or MRI renal mass protocol is recommended. 3.  Morphologic findings suggestive of chronic liver disease. 4.  Additional chronic and benign findings, as above.      Scheduled Meds:   furosemide  20 mg IntraVENous Once    amiodarone bolus  300 mg IntraVENous Once    piperacillin-tazobactam  3,375 mg IntraVENous Q8H    metoprolol  2.5 mg IntraVENous Q6H    pantoprazole  40 mg IntraVENous Daily    sodium chloride flush  5-40 mL IntraVENous 2 times per day    enoxaparin  40 mg SubCUTAneous Nightly    acetaminophen  1,000 mg Oral 3 times per day     Continuous Infusions:   sodium chloride       PRN Meds:.diazePAM, magnesium sulfate, sodium phosphate IVPB **OR** sodium phosphate IVPB **OR** sodium phosphate IVPB, sodium chloride flush, sodium chloride, ondansetron **OR** ondansetron, polyethylene glycol, acetaminophen **OR** acetaminophen, oxyCODONE, morphine **OR** morphine      Assessment:  65 y.o. female admitted with   1. Cecal volvulus ([facility]    2.  Septicemia ([facility]    3. Volvulus ([facility]        OR Date 6/27/2022, exploratory laparotomy, detorsion of volvulus, right hemicolectomy with primary ileocolonic anastomosis for cecal volvulus  Hypertension  Acute kidney injury      Plan:    Transfer and treat a fib medically - IM and Cardiology seeing pt  Can have anticoagulation if needed  Hold on diet, no BM yet  Limit IV, Lasix   Path reviewed    Freddy Graf MD

## 2022-07-01 DIAGNOSIS — Z91.89 AT RISK FOR SLEEP APNEA: Primary | ICD-10-CM

## 2022-07-01 DIAGNOSIS — I48.91 ATRIAL FIBRILLATION, UNSPECIFIED TYPE (HCC): Primary | ICD-10-CM

## 2022-07-01 LAB
ANION GAP SERPL CALCULATED.3IONS-SCNC: 8 MMOL/L (ref 3–16)
BASOPHILS ABSOLUTE: 0.1 K/UL (ref 0–0.2)
BASOPHILS RELATIVE PERCENT: 0.7 %
BLOOD CULTURE, ROUTINE: NORMAL
BUN BLDV-MCNC: 13 MG/DL (ref 7–20)
CALCIUM SERPL-MCNC: 9.2 MG/DL (ref 8.3–10.6)
CHLORIDE BLD-SCNC: 101 MMOL/L (ref 99–110)
CO2: 27 MMOL/L (ref 21–32)
CREAT SERPL-MCNC: 1.1 MG/DL (ref 0.6–1.2)
CULTURE, BLOOD 2: NORMAL
EOSINOPHILS ABSOLUTE: 0.4 K/UL (ref 0–0.6)
EOSINOPHILS RELATIVE PERCENT: 4.4 %
GFR AFRICAN AMERICAN: 58
GFR NON-AFRICAN AMERICAN: 48
GLUCOSE BLD-MCNC: 98 MG/DL (ref 70–99)
HCT VFR BLD CALC: 39.4 % (ref 36–48)
HEMOGLOBIN: 13.1 G/DL (ref 12–16)
LYMPHOCYTES ABSOLUTE: 0.8 K/UL (ref 1–5.1)
LYMPHOCYTES RELATIVE PERCENT: 9.2 %
MCH RBC QN AUTO: 29.1 PG (ref 26–34)
MCHC RBC AUTO-ENTMCNC: 33.3 G/DL (ref 31–36)
MCV RBC AUTO: 87.6 FL (ref 80–100)
MONOCYTES ABSOLUTE: 0.8 K/UL (ref 0–1.3)
MONOCYTES RELATIVE PERCENT: 9.8 %
NEUTROPHILS ABSOLUTE: 6.5 K/UL (ref 1.7–7.7)
NEUTROPHILS RELATIVE PERCENT: 75.9 %
PDW BLD-RTO: 13.7 % (ref 12.4–15.4)
PLATELET # BLD: 215 K/UL (ref 135–450)
PMV BLD AUTO: 8.2 FL (ref 5–10.5)
POTASSIUM SERPL-SCNC: 4.2 MMOL/L (ref 3.5–5.1)
RBC # BLD: 4.5 M/UL (ref 4–5.2)
SODIUM BLD-SCNC: 136 MMOL/L (ref 136–145)
WBC # BLD: 8.5 K/UL (ref 4–11)

## 2022-07-01 PROCEDURE — 2580000003 HC RX 258: Performed by: SURGERY

## 2022-07-01 PROCEDURE — 6370000000 HC RX 637 (ALT 250 FOR IP): Performed by: SURGERY

## 2022-07-01 PROCEDURE — 85025 COMPLETE CBC W/AUTO DIFF WBC: CPT

## 2022-07-01 PROCEDURE — 80048 BASIC METABOLIC PNL TOTAL CA: CPT

## 2022-07-01 PROCEDURE — 2580000003 HC RX 258: Performed by: INTERNAL MEDICINE

## 2022-07-01 PROCEDURE — 2100000000 HC CCU R&B

## 2022-07-01 PROCEDURE — C9113 INJ PANTOPRAZOLE SODIUM, VIA: HCPCS | Performed by: INTERNAL MEDICINE

## 2022-07-01 PROCEDURE — 99024 POSTOP FOLLOW-UP VISIT: CPT | Performed by: SURGERY

## 2022-07-01 PROCEDURE — 6370000000 HC RX 637 (ALT 250 FOR IP): Performed by: INTERNAL MEDICINE

## 2022-07-01 PROCEDURE — 99233 SBSQ HOSP IP/OBS HIGH 50: CPT | Performed by: NURSE PRACTITIONER

## 2022-07-01 PROCEDURE — 6360000002 HC RX W HCPCS: Performed by: SURGERY

## 2022-07-01 PROCEDURE — 6370000000 HC RX 637 (ALT 250 FOR IP): Performed by: NURSE PRACTITIONER

## 2022-07-01 PROCEDURE — 94760 N-INVAS EAR/PLS OXIMETRY 1: CPT

## 2022-07-01 PROCEDURE — 6360000002 HC RX W HCPCS: Performed by: INTERNAL MEDICINE

## 2022-07-01 PROCEDURE — 2000000000 HC ICU R&B

## 2022-07-01 RX ADMIN — ACETAMINOPHEN 1000 MG: 500 TABLET ORAL at 02:45

## 2022-07-01 RX ADMIN — PIPERACILLIN AND TAZOBACTAM 3375 MG: 3; .375 INJECTION, POWDER, LYOPHILIZED, FOR SOLUTION INTRAVENOUS at 04:05

## 2022-07-01 RX ADMIN — Medication 10 ML: at 08:44

## 2022-07-01 RX ADMIN — MORPHINE SULFATE 2 MG: 2 INJECTION, SOLUTION INTRAMUSCULAR; INTRAVENOUS at 16:21

## 2022-07-01 RX ADMIN — PIPERACILLIN AND TAZOBACTAM 3375 MG: 3; .375 INJECTION, POWDER, LYOPHILIZED, FOR SOLUTION INTRAVENOUS at 21:26

## 2022-07-01 RX ADMIN — OXYCODONE 5 MG: 5 TABLET ORAL at 04:02

## 2022-07-01 RX ADMIN — POLYETHYLENE GLYCOL 3350 17 G: 17 POWDER, FOR SOLUTION ORAL at 14:56

## 2022-07-01 RX ADMIN — AMIODARONE HYDROCHLORIDE 200 MG: 200 TABLET ORAL at 08:43

## 2022-07-01 RX ADMIN — Medication 10 ML: at 22:48

## 2022-07-01 RX ADMIN — ENOXAPARIN SODIUM 40 MG: 100 INJECTION SUBCUTANEOUS at 21:22

## 2022-07-01 RX ADMIN — PIPERACILLIN AND TAZOBACTAM 3375 MG: 3; .375 INJECTION, POWDER, LYOPHILIZED, FOR SOLUTION INTRAVENOUS at 12:27

## 2022-07-01 RX ADMIN — PANTOPRAZOLE SODIUM 40 MG: 40 INJECTION, POWDER, LYOPHILIZED, FOR SOLUTION INTRAVENOUS at 08:43

## 2022-07-01 RX ADMIN — ONDANSETRON 4 MG: 4 TABLET, ORALLY DISINTEGRATING ORAL at 16:21

## 2022-07-01 RX ADMIN — OXYCODONE 5 MG: 5 TABLET ORAL at 14:43

## 2022-07-01 RX ADMIN — METOPROLOL TARTRATE 12.5 MG: 25 TABLET, FILM COATED ORAL at 21:22

## 2022-07-01 RX ADMIN — OXYCODONE 5 MG: 5 TABLET ORAL at 22:46

## 2022-07-01 RX ADMIN — METOPROLOL TARTRATE 12.5 MG: 25 TABLET, FILM COATED ORAL at 08:43

## 2022-07-01 ASSESSMENT — PAIN SCALES - GENERAL
PAINLEVEL_OUTOF10: 0
PAINLEVEL_OUTOF10: 6
PAINLEVEL_OUTOF10: 5
PAINLEVEL_OUTOF10: 6
PAINLEVEL_OUTOF10: 10
PAINLEVEL_OUTOF10: 0
PAINLEVEL_OUTOF10: 8

## 2022-07-01 ASSESSMENT — PAIN DESCRIPTION - PAIN TYPE: TYPE: SURGICAL PAIN

## 2022-07-01 ASSESSMENT — PAIN DESCRIPTION - DESCRIPTORS
DESCRIPTORS: ACHING
DESCRIPTORS: ACHING

## 2022-07-01 ASSESSMENT — PAIN DESCRIPTION - ORIENTATION
ORIENTATION: MID
ORIENTATION: MID

## 2022-07-01 ASSESSMENT — PAIN DESCRIPTION - LOCATION
LOCATION: ABDOMEN
LOCATION: ABDOMEN

## 2022-07-01 ASSESSMENT — PAIN DESCRIPTION - ONSET: ONSET: ON-GOING

## 2022-07-01 ASSESSMENT — PAIN DESCRIPTION - FREQUENCY: FREQUENCY: INTERMITTENT

## 2022-07-01 NOTE — CARE COORDINATION
CM met with patient. Alert and oriented. States she lives alone. Currently not active with any home care services. Pt had a R hemicolectomy with resection. Has GINGER drain. NG and bocanegra out. Therapy evaluations pending at this time. Cm provided insurance approved facility list and medicare ratings list in case needed for short term rehab choices.       Marlon Mcallister RN, BSN  [phone]

## 2022-07-01 NOTE — PROGRESS NOTES
IV cardizem for rate control. Rate up to 200 starting at [address] Converted to NSR rate in the 70's. Had brief periods of atrial fib throughout the night with rate up to 140.   Cardizem now off, HR 58 SB

## 2022-07-01 NOTE — PROGRESS NOTES
[facility]        Progress Note    Patient Name: Janet Agudelo  MRN: [id number]  YOB: 1945  Date of Evaluation: 2022    Subjective:  Has a fib. Rate controlled this am  No nausea or vomiting, passed flatus  Resting in chair at this time, in good spirits    Post-Operative Day # 4      Vital Signs:  Patient Vitals for the past 24 hrs:   BP Temp Temp src Pulse Resp SpO2 Weight   22 1159 -- -- -- 61 -- -- --   22 1140 116/62 97.8 °F (36.6 °C) Temporal 61 18 96 % --   22 0858 -- -- -- -- 16 96 % --   22 0800 109/60 97.7 °F (36.5 °C) Temporal 60 18 95 % --   22 0700 (!) 100/59 -- -- 58 -- -- --   22 0600 100/63 -- -- 60 -- -- --   22 0500 (!) 109/56 -- -- 61 -- -- --   22 0354 -- -- -- -- -- -- 253 lb 12 oz (115.1 kg)   22 2044 115/60 -- -- 69 -- -- --   22 1620 127/65 98.7 °F (37.1 °C) Temporal 73 20 93 % --   22 1515 127/68 -- -- -- -- -- --      TEMPERATURE HISTORY 24H: Temp (24hrs), Av.1 °F (36.7 °C), Min:97.7 °F (36.5 °C), Max:98.7 °F (37.1 °C)    BLOOD PRESSURE HISTORY: Systolic (52LEI), AKJ:514 , Min:90 , QHQ:185    Diastolic (13SZE), ILD:01, Min:56, Max:71      Intake/Output:  I/O last 3 completed shifts: In: 252.2 [I.V.:19.2; IV Piggyback:233]  Out: 1200 [Urine:1200]  No intake/output data recorded.   Drain/tube Output:       Physical Exam:  General: awake, alert, oriented to person, place, time  Lungs: unlabored respirations  Abdomen: soft, mildly distended, incisional tenderness only, more active bowel sounds present   Skin/Wound: GINGER dressing in place, seal/suction intact    Labs:  CBC:    Recent Labs     22  0950 22  0155 22  1045   WBC 10.3 10.2 8.5   HGB 12.3 12.0 13.1   HCT 38.1 36.2 39.4    169 215     BMP:    Recent Labs     22  0950 22  0155 22  1045    136 136   K 4.6 4.1 4.2    103 101   CO2 23 25 27   BUN 16 13 13 CREATININE 1.2 1.3* 1.1   GLUCOSE 139* 130* 98     Hepatic:    No results for input(s): AST, ALT, ALB, BILITOT, ALKPHOS in the last 72 hours. Amylase:    Lab Results   Component Value Date/Time    AMYLASE 38 03/20/2017 05:08 PM     Lipase:    Lab Results   Component Value Date/Time    LIPASE 107.0 06/27/2022 08:57 PM    LIPASE 58.0 03/20/2017 05:08 PM    LIPASE 16.0 03/12/2017 06:07 AM      Mag:    Lab Results   Component Value Date/Time    MG 2.10 06/30/2022 08:21 AM    MG 2.00 06/30/2022 01:55 AM     Phos:   No results found for: PHOS   Coags:   Lab Results   Component Value Date/Time    PROTIME 11.8 02/18/2019 07:10 AM    INR 1.04 02/18/2019 07:10 AM    APTT 37.2 03/26/2018 08:29 PM       Cultures:  Anaerobic culture  No results found for: LABANAE  Fungus stain  No results found for requested labs within last 30 days. Gram stain  No results found for requested labs within last 30 days. Organism  Lab Results   Component Value Date/Time    ORG Klebsiella pneumoniae (A) 03/24/2022 04:07 PM     Surgical culture  No results found for: CXSURG  Blood culture 1  Results in Past 30 Days  Result Component Current Result Ref Range Previous Result Ref Range   Blood Culture, Routine No Growth to date. Any change in status will be called. (6/27/2022)  Not in Time Range      Blood culture 2  Results in Past 30 Days  Result Component Current Result Ref Range Previous Result Ref Range   Culture, Blood 2 No Growth to date. Any change in status will be called. (6/27/2022)  Not in Time Range      Fecal occult  No results found for requested labs within last 30 days. GI bacterial pathogens by PCR  No results found for requested labs within last 30 days. C. difficile  No results found for requested labs within last 30 days.      Urine culture  Lab Results   Component Value Date/Time    LABURIN >100,000 CFU/ml 03/24/2022 04:07 PM       Pathology:  OR 6/27/2022--FINAL DIAGNOSIS:     Colon and terminal ileum, right hemicolectomy:   -Congestion of colonic wall consistent with history of volvulus.   -Histologically unremarkable appendix.   -Negative for malignancy. Imaging:  I have personally reviewed the following films:    CT ABDOMEN PELVIS WO CONTRAST Additional Contrast? None    Result Date: 6/27/2022  EXAMINATION: CT OF THE ABDOMEN AND PELVIS WITHOUT CONTRAST 6/27/2022 8:43 pm TECHNIQUE: CT of the abdomen and pelvis was performed without the administration of intravenous contrast. Multiplanar reformatted images are provided for review. Automated exposure control, iterative reconstruction, and/or weight based adjustment of the mA/kV was utilized to reduce the radiation dose to as low as reasonably achievable. COMPARISON: None. HISTORY: ORDERING SYSTEM PROVIDED HISTORY: abd pain n/v TECHNOLOGIST PROVIDED HISTORY: Reason for exam:->abd pain n/v Additional Contrast?->None Decision Support Exception - unselect if not a suspected or confirmed emergency medical condition->Emergency Medical Condition (MA) Reason for Exam: abd pain n/v FINDINGS: Lower Chest: Scarring in the anterior right middle lobe and lingula. No acute findings. Small hiatal hernia. Organs: Evaluation of the abdominal and pelvic viscera is limited in the absence of contrast.  Subtle lobulated liver contour and widening of the fissural spaces suggestive of chronic liver disease. Cholecystectomy. The pancreas, spleen, adrenals and kidneys reveal no acute findings. There is an indeterminate 1.8 cm lesion arising from the posterosuperior pole the left kidney. GI/Bowel: Findings compatible cecal volvulus, with the dilated cecum located in the left upper quadrant measuring up to 10 cm in greatest dimension. Associated twisting of the mesentery and transition point on axial image 155. No small bowel distension. Mesenteric edema is noted. No pneumatosis or gross perforation identified. Distal colonic diverticulosis. Pelvis: No acute findings. Peritoneum/Retroperitoneum: Trace abdominopelvic ascites. No free air. No lymphadenopathy. Bones/Soft Tissues: No acute osseous abnormality identified. Multilevel degenerative disc disease and facet arthropathy with degenerative grade 1 anterolisthesis of L4.     1.  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 Associated mesenteric edema and trace abdominopelvic ascites. No pneumatosis or gross perforation. Findings were discussed with Haleigh Bee at 9:58 pm on 6/27/2022. 2.  Incidental 1.8 cm indeterminate left renal lesion for which nonemergent follow-up with contrast-enhanced CT or MRI renal mass protocol is recommended. 3.  Morphologic findings suggestive of chronic liver disease. 4.  Additional chronic and benign findings, as above. Scheduled Meds:   amiodarone bolus  300 mg IntraVENous Once    amiodarone  200 mg Oral Daily    metoprolol tartrate  12.5 mg Oral BID    piperacillin-tazobactam  3,375 mg IntraVENous Q8H    pantoprazole  40 mg IntraVENous Daily    sodium chloride flush  5-40 mL IntraVENous 2 times per day    enoxaparin  40 mg SubCUTAneous Nightly     Continuous Infusions:   dilTIAZem Stopped (07/01/22 0700)    sodium chloride       PRN Meds:.diazePAM, magnesium sulfate, sodium phosphate IVPB **OR** sodium phosphate IVPB **OR** sodium phosphate IVPB, sodium chloride flush, sodium chloride, ondansetron **OR** ondansetron, polyethylene glycol, acetaminophen **OR** acetaminophen, oxyCODONE, morphine **OR** morphine      Assessment:  65 y.o. female admitted with   1. Cecal volvulus ([facility] [address]    2.  Septicemia ([facility] [address]    3. Volvulus ([facility] [address]        OR Date 6/27/2022, exploratory laparotomy, detorsion of volvulus, right hemicolectomy with primary ileocolonic anastomosis for cecal volvulus  Hypertension  Acute kidney injury      Plan:    Transferred to CVU and a fib tx plan ongoing  Can have anticoagulation if needed  Clears po  Limit IV, Lasix   Path reviewed yesterday  IS, Analy Escobar MD

## 2022-07-01 NOTE — PROGRESS NOTES
[facility]    7/1/2022 2:07 PM        Name: Niki Delacruz . Admitted: 6/27/2022  Primary Care Provider: CARLIE Hernandez CNP (Tel: [phone])                        Subjective:  . No acute events overnight. Resting well. Pain control. Diet ok. Labs reviewed  Denies any chest pain sob.      Reviewed interval ancillary notes    Current Medications  amiodarone (CORDARONE) 300 mg in dextrose 5 % 100 mL bolus, Once  amiodarone (CORDARONE) tablet 200 mg, Daily  metoprolol tartrate (LOPRESSOR) tablet 12.5 mg, BID  dilTIAZem (CARDIZEM) 125 mg in dextrose 5% 125 mL infusion (premix), Continuous  diazePAM (VALIUM) injection 2.5 mg, Q8H PRN  magnesium sulfate 2000 mg in 50 mL IVPB premix, PRN  sodium phosphate 10 mmol in sodium chloride 0.9 % 250 mL IVPB, PRN   Or  sodium phosphate 15 mmol in sodium chloride 0.9 % 250 mL IVPB, PRN   Or  sodium phosphate 20 mmol in sodium chloride 0.9 % 500 mL IVPB, PRN  piperacillin-tazobactam (ZOSYN) 3,375 mg in dextrose 5 % 50 mL IVPB (mini-bag), Q8H  pantoprazole (PROTONIX) injection 40 mg, Daily  sodium chloride flush 0.9 % injection 5-40 mL, 2 times per day  sodium chloride flush 0.9 % injection 5-40 mL, PRN  0.9 % sodium chloride infusion, PRN  ondansetron (ZOFRAN-ODT) disintegrating tablet 4 mg, Q8H PRN   Or  ondansetron (ZOFRAN) injection 4 mg, Q6H PRN  polyethylene glycol (GLYCOLAX) packet 17 g, Daily PRN  acetaminophen (TYLENOL) tablet 650 mg, Q6H PRN   Or  acetaminophen (TYLENOL) suppository 650 mg, Q6H PRN  enoxaparin (LOVENOX) injection 40 mg, Nightly  oxyCODONE (ROXICODONE) immediate release tablet 5 mg, Q6H PRN  morphine (PF) injection 2 mg, Q2H PRN   Or  morphine injection 4 mg, Q2H PRN        Objective:  /62   Pulse 61   Temp 97.8 °F (36.6 °C) (Temporal)   Resp 18   Ht 5' 6\" (1.676 m)   Wt 253 lb 12 oz (115.1 kg)   SpO2 96%   BMI 40.96 kg/m²     Intake/Output Summary (Last 24 hours) at 7/1/2022 1407  Last data filed at 7/1/2022 1140  Gross per 24 hour   Intake 252.17 ml   Output 600 ml   Net -347.83 ml      Wt Readings from Last 3 Encounters:   07/01/22 253 lb 12 oz (115.1 kg)   03/24/22 254 lb 12.8 oz (115.6 kg)   09/28/21 250 lb (113.4 kg)       General appearance:  Appears comfortable  Eyes: Sclera clear. Pupils equal.  ENT: Moist oral mucosa. Trachea midline, no adenopathy. Cardiovascular: Regular rhythm, normal S1, S2. No murmur. No edema in lower extremities  Respiratory: Not using accessory muscles. Good inspiratory effort. Clear to auscultation bilaterally, no wheeze or crackles. GI: Abdomen soft, no tenderness, not distended, normal bowel sounds  Musculoskeletal: No cyanosis in digits, neck supple  Neurology: CN 2-12 grossly intact. No speech or motor deficits  Psych: Normal affect. Alert and oriented in time, place and person  Skin: Warm, dry, normal turgor    Labs and Tests:  CBC:   Recent Labs     06/29/22  0950 06/30/22  0155 07/01/22  1045   WBC 10.3 10.2 8.5   HGB 12.3 12.0 13.1    169 215     BMP:    Recent Labs     06/29/22  0950 06/30/22  0155 07/01/22  1045    136 136   K 4.6 4.1 4.2    103 101   CO2 23 25 27   BUN 16 13 13   CREATININE 1.2 1.3* 1.1   GLUCOSE 139* 130* 98     Hepatic: No results for input(s): AST, ALT, ALB, BILITOT, ALKPHOS in the last 72 hours. Discussed care with patient             Problem List  Principal Problem:    Cecal volvulus ([address]  Active Problems:    LV dysfunction    AMPARO (acute kidney injury) ([address]    Acquired hypothyroidism    GERD (gastroesophageal reflux disease)    Atrial fibrillation (HCC)    Chronic combined systolic (congestive) and diastolic (congestive) heart failure ([address]  Resolved Problems:    * No resolved hospital problems. *       Assessment & Plan:   1. Cecal volvulus  -Continue management per general surgery.       A. fib with RVR  -Likely multifactorial.    -Patient given amiodarone. With significant improvement. Appreciate cardiology recommendation this could be with all the issues going on    Chronic CHF  0 given one-time doses yesterday. We will limit Lasix given patient may still be dehydrated      Acute kidney injury resolved            Diet: ADULT DIET;  Clear Liquid  Code:Full Code  DVT PPX cesar Vargas MD   7/1/2022 2:07 PM

## 2022-07-02 ENCOUNTER — APPOINTMENT (OUTPATIENT)
Dept: GENERAL RADIOLOGY | Age: 77
DRG: 853 | End: 2022-07-02
Payer: MEDICARE

## 2022-07-02 PROCEDURE — 6370000000 HC RX 637 (ALT 250 FOR IP): Performed by: INTERNAL MEDICINE

## 2022-07-02 PROCEDURE — 2580000003 HC RX 258: Performed by: SURGERY

## 2022-07-02 PROCEDURE — 2580000003 HC RX 258: Performed by: INTERNAL MEDICINE

## 2022-07-02 PROCEDURE — 6360000002 HC RX W HCPCS: Performed by: SURGERY

## 2022-07-02 PROCEDURE — 6360000002 HC RX W HCPCS: Performed by: INTERNAL MEDICINE

## 2022-07-02 PROCEDURE — 74019 RADEX ABDOMEN 2 VIEWS: CPT

## 2022-07-02 PROCEDURE — C9113 INJ PANTOPRAZOLE SODIUM, VIA: HCPCS | Performed by: INTERNAL MEDICINE

## 2022-07-02 PROCEDURE — 6370000000 HC RX 637 (ALT 250 FOR IP): Performed by: HOSPITALIST

## 2022-07-02 PROCEDURE — 6370000000 HC RX 637 (ALT 250 FOR IP): Performed by: NURSE PRACTITIONER

## 2022-07-02 PROCEDURE — 2000000000 HC ICU R&B

## 2022-07-02 PROCEDURE — 99024 POSTOP FOLLOW-UP VISIT: CPT | Performed by: SURGERY

## 2022-07-02 RX ORDER — METOCLOPRAMIDE HYDROCHLORIDE 5 MG/ML
10 INJECTION INTRAMUSCULAR; INTRAVENOUS EVERY 6 HOURS
Status: DISCONTINUED | OUTPATIENT
Start: 2022-07-02 | End: 2022-07-09 | Stop reason: HOSPADM

## 2022-07-02 RX ORDER — LEVOTHYROXINE SODIUM 112 UG/1
112 TABLET ORAL DAILY
Status: DISCONTINUED | OUTPATIENT
Start: 2022-07-02 | End: 2022-07-09 | Stop reason: HOSPADM

## 2022-07-02 RX ORDER — AMITRIPTYLINE HYDROCHLORIDE 25 MG/1
12.5 TABLET, FILM COATED ORAL NIGHTLY
Status: DISCONTINUED | OUTPATIENT
Start: 2022-07-02 | End: 2022-07-09 | Stop reason: HOSPADM

## 2022-07-02 RX ORDER — SERTRALINE HYDROCHLORIDE 25 MG/1
25 TABLET, FILM COATED ORAL DAILY
Status: DISCONTINUED | OUTPATIENT
Start: 2022-07-02 | End: 2022-07-09 | Stop reason: HOSPADM

## 2022-07-02 RX ADMIN — PIPERACILLIN AND TAZOBACTAM 3375 MG: 3; .375 INJECTION, POWDER, LYOPHILIZED, FOR SOLUTION INTRAVENOUS at 13:06

## 2022-07-02 RX ADMIN — PIPERACILLIN AND TAZOBACTAM 3375 MG: 3; .375 INJECTION, POWDER, LYOPHILIZED, FOR SOLUTION INTRAVENOUS at 21:14

## 2022-07-02 RX ADMIN — ONDANSETRON 4 MG: 2 INJECTION INTRAMUSCULAR; INTRAVENOUS at 15:03

## 2022-07-02 RX ADMIN — Medication 10 ML: at 20:41

## 2022-07-02 RX ADMIN — METOPROLOL TARTRATE 12.5 MG: 25 TABLET, FILM COATED ORAL at 21:03

## 2022-07-02 RX ADMIN — SERTRALINE 25 MG: 25 TABLET, FILM COATED ORAL at 13:05

## 2022-07-02 RX ADMIN — METOPROLOL TARTRATE 12.5 MG: 25 TABLET, FILM COATED ORAL at 08:07

## 2022-07-02 RX ADMIN — MORPHINE SULFATE 4 MG: 4 INJECTION INTRAVENOUS at 15:16

## 2022-07-02 RX ADMIN — AMIODARONE HYDROCHLORIDE 200 MG: 200 TABLET ORAL at 08:07

## 2022-07-02 RX ADMIN — METOCLOPRAMIDE 10 MG: 5 INJECTION, SOLUTION INTRAMUSCULAR; INTRAVENOUS at 20:40

## 2022-07-02 RX ADMIN — LEVOTHYROXINE SODIUM 112 MCG: 0.11 TABLET ORAL at 13:05

## 2022-07-02 RX ADMIN — ONDANSETRON 4 MG: 2 INJECTION INTRAMUSCULAR; INTRAVENOUS at 00:57

## 2022-07-02 RX ADMIN — Medication 10 ML: at 08:07

## 2022-07-02 RX ADMIN — AMITRIPTYLINE HYDROCHLORIDE 12.5 MG: 25 TABLET, FILM COATED ORAL at 21:03

## 2022-07-02 RX ADMIN — ENOXAPARIN SODIUM 40 MG: 100 INJECTION SUBCUTANEOUS at 21:03

## 2022-07-02 RX ADMIN — PANTOPRAZOLE SODIUM 40 MG: 40 INJECTION, POWDER, LYOPHILIZED, FOR SOLUTION INTRAVENOUS at 08:07

## 2022-07-02 RX ADMIN — PIPERACILLIN AND TAZOBACTAM 3375 MG: 3; .375 INJECTION, POWDER, LYOPHILIZED, FOR SOLUTION INTRAVENOUS at 05:14

## 2022-07-02 RX ADMIN — METOCLOPRAMIDE 10 MG: 5 INJECTION, SOLUTION INTRAMUSCULAR; INTRAVENOUS at 14:56

## 2022-07-02 ASSESSMENT — PAIN SCALES - GENERAL: PAINLEVEL_OUTOF10: 0

## 2022-07-02 NOTE — PROGRESS NOTES
[facility]    7/2/2022 12:49 PM        Name: Rosamaria Calix . Admitted: 6/27/2022  Primary Care Provider: CARLIE Barber CNP (Tel: [phone])                        Subjective:  . No acute events overnight. Resting well. Pain control. Diet ok. Labs reviewed  Denies any chest pain sob.      Reviewed interval ancillary notes    Current Medications  levothyroxine (SYNTHROID) tablet 112 mcg, Daily  amitriptyline (ELAVIL) tablet 12.5 mg, Nightly  sertraline (ZOLOFT) tablet 25 mg, Daily  amiodarone (CORDARONE) 300 mg in dextrose 5 % 100 mL bolus, Once  amiodarone (CORDARONE) tablet 200 mg, Daily  metoprolol tartrate (LOPRESSOR) tablet 12.5 mg, BID  dilTIAZem (CARDIZEM) 125 mg in dextrose 5% 125 mL infusion (premix), Continuous  diazePAM (VALIUM) injection 2.5 mg, Q8H PRN  magnesium sulfate 2000 mg in 50 mL IVPB premix, PRN  sodium phosphate 10 mmol in sodium chloride 0.9 % 250 mL IVPB, PRN   Or  sodium phosphate 15 mmol in sodium chloride 0.9 % 250 mL IVPB, PRN   Or  sodium phosphate 20 mmol in sodium chloride 0.9 % 500 mL IVPB, PRN  piperacillin-tazobactam (ZOSYN) 3,375 mg in dextrose 5 % 50 mL IVPB (mini-bag), Q8H  pantoprazole (PROTONIX) injection 40 mg, Daily  sodium chloride flush 0.9 % injection 5-40 mL, 2 times per day  sodium chloride flush 0.9 % injection 5-40 mL, PRN  0.9 % sodium chloride infusion, PRN  ondansetron (ZOFRAN-ODT) disintegrating tablet 4 mg, Q8H PRN   Or  ondansetron (ZOFRAN) injection 4 mg, Q6H PRN  polyethylene glycol (GLYCOLAX) packet 17 g, Daily PRN  acetaminophen (TYLENOL) tablet 650 mg, Q6H PRN   Or  acetaminophen (TYLENOL) suppository 650 mg, Q6H PRN  enoxaparin (LOVENOX) injection 40 mg, Nightly  oxyCODONE (ROXICODONE) immediate release tablet 5 mg, Q6H PRN  morphine (PF) injection 2 mg, Q2H PRN   Or  morphine & Plan:   1. Cecal volvulus  -Continue management per general surgery.  -Doing well. Had some nausea this morning      A. fib with RVR  -Likely multifactorial.    --Patient is better controlled now on p.o. amiodarone.  -Management per cardiology. Chronic CHF  -Seems to be stable no volume overload. At this time we will monitor closely resume p.o. Lasix if okay      Acute kidney injury resolved            Diet: ADULT DIET;  Clear Liquid  Code:Full Code  DVT PPX lovenox       Mary Ma MD   7/2/2022 12:49 PM

## 2022-07-02 NOTE — PROGRESS NOTES
Amadeo Singh is a 65 y.o. female patient.     Current Facility-Administered Medications   Medication Dose Route Frequency Provider Last Rate Last Admin    levothyroxine (SYNTHROID) tablet 112 mcg  112 mcg Oral Daily Emma Olsen MD   112 mcg at 07/02/22 1305    amitriptyline (ELAVIL) tablet 12.5 mg  12.5 mg Oral Nightly Emma Olsen MD        sertraline (ZOLOFT) tablet 25 mg  25 mg Oral Daily Emma Olsen MD   25 mg at 07/02/22 1305    amiodarone (CORDARONE) 300 mg in dextrose 5 % 100 mL bolus  300 mg IntraVENous Once Angie Aponte APRN - SVITLANA        amiodarone (CORDARONE) tablet 200 mg  200 mg Oral Daily Jose C Rosario MD   200 mg at 07/02/22 8276    metoprolol tartrate (LOPRESSOR) tablet 12.5 mg  12.5 mg Oral BID Angie Aponte APRN - CNP   12.5 mg at 07/02/22 2703    dilTIAZem (CARDIZEM) 125 mg in dextrose 5% 125 mL infusion (premix)  2.5-15 mg/hr IntraVENous Continuous Justus Couch MD   Stopped at 07/01/22 0700    diazePAM (VALIUM) injection 2.5 mg  2.5 mg IntraVENous Q8H PRN Katy Perry MD        magnesium sulfate 2000 mg in 50 mL IVPB premix  2,000 mg IntraVENous PRN Lita Islasistissac, APRN - CNP        sodium phosphate 10 mmol in sodium chloride 0.9 % 250 mL IVPB  10 mmol IntraVENous PRN Maria Del Carmena L Dechristopher, APRN - CNP        Or    sodium phosphate 15 mmol in sodium chloride 0.9 % 250 mL IVPB  15 mmol IntraVENous PRN Maria Del Carmena L Dechristophbobbi, APRN - CNP        Or    sodium phosphate 20 mmol in sodium chloride 0.9 % 500 mL IVPB  20 mmol IntraVENous PRN Lita Kimhristophbobbi, APRN - CNP        piperacillin-tazobactam (ZOSYN) 3,375 mg in dextrose 5 % 50 mL IVPB (mini-bag)  3,375 mg IntraVENous Wing Mancini MD 12.5 mL/hr at 07/02/22 1306 3,375 mg at 07/02/22 1306    pantoprazole (PROTONIX) injection 40 mg  40 mg IntraVENous Daily Gallaway Hammers, MD   40 mg at 07/02/22 0807    sodium chloride flush 0.9 % injection 5-40 mL  5-40 mL IntraVENous 2 times per day Sandra Maharaj MD   10 mL at 07/02/22 0807    sodium chloride flush 0.9 % injection 5-40 mL  5-40 mL IntraVENous PRN Sandra Maharaj MD        0.9 % sodium chloride infusion   IntraVENous PRN Sandra Maharaj MD        ondansetron (ZOFRAN-ODT) disintegrating tablet 4 mg  4 mg Oral Q8H PRN Sandra Maharaj MD   4 mg at 07/01/22 1621    Or    ondansetron (ZOFRAN) injection 4 mg  4 mg IntraVENous Q6H PRN Sandra Maharaj MD   4 mg at 07/02/22 0057    polyethylene glycol (GLYCOLAX) packet 17 g  17 g Oral Daily PRN Sandra Maharaj MD   17 g at 07/01/22 1456    acetaminophen (TYLENOL) tablet 650 mg  650 mg Oral Q6H PRN Sandra Maharaj MD        Or   [facility] acetaminophen (TYLENOL) suppository 650 mg  650 mg Rectal Q6H PRN Sandra Maharaj MD        enoxaparin (LOVENOX) injection 40 mg  40 mg SubCUTAneous Nightly Alma Delia Mazariegos MD   40 mg at 07/01/22 2122    oxyCODONE (ROXICODONE) immediate release tablet 5 mg  5 mg Oral Q6H PRN Alma Delia Mazariegos MD   5 mg at 07/01/22 2246    morphine (PF) injection 2 mg  2 mg IntraVENous Q2H PRN Alma Delia Mazariegos MD   2 mg at 07/01/22 1621    Or    morphine injection 4 mg  4 mg IntraVENous Q2H PRN Alma Delia Mazariegos MD   4 mg at 06/29/22 1703     Allergies   Allergen Reactions    Benadryl [Diphenhydramine]      Accelerated heart rate     Principal Problem:    Cecal volvulus ([address]  Active Problems:    LV dysfunction    AMPARO (acute kidney injury) ([address]    Acquired hypothyroidism    GERD (gastroesophageal reflux disease)    Atrial fibrillation (HCC)    Chronic combined systolic (congestive) and diastolic (congestive) heart failure ([address]  Resolved Problems:    * No resolved hospital problems. *    Blood pressure (!) 140/78, pulse 67, temperature 96.8 °F (36 °C), temperature source Temporal, resp. rate 18, height 5' 6\" (1.676 m), weight 255 lb 4.7 oz (115.8 kg), SpO2 96 %, not currently breastfeeding. Subjective:  Symptoms:  Stable. Diet:  Poor intake. Activity level: Impaired due to weakness. Pain:  She complains of pain that is mild. Objective:  General Appearance:  Comfortable. Vital signs: (most recent): Blood pressure (!) 140/78, pulse 67, temperature 96.8 °F (36 °C), temperature source Temporal, resp. rate 18, height 5' 6\" (1.676 m), weight 255 lb 4.7 oz (115.8 kg), SpO2 96 %, not currently breastfeeding. Output: Producing urine. Lungs:  Normal effort and normal respiratory rate. Abdomen: Abdomen is soft and distended. (Incision approximated. Abdomen appropriately tender. ). Neurological: Patient is alert and oriented to person, place and time. Skin:  Warm and dry. Assessment & Plan 44-year-old female who is postop day #5 status post right hemicolectomy for a cecal volvulus. She has developed abdominal distention and has had multiple episodes of nausea and vomiting overnight. Her abdomen is appropriately tender. Will check abdominal x-rays. May need NG tube decompression. Add IV Reglan. Consult physical therapy.     Clay Simon MD  7/2/2022

## 2022-07-03 PROCEDURE — 6370000000 HC RX 637 (ALT 250 FOR IP): Performed by: HOSPITALIST

## 2022-07-03 PROCEDURE — 2580000003 HC RX 258: Performed by: SURGERY

## 2022-07-03 PROCEDURE — 6360000002 HC RX W HCPCS: Performed by: SURGERY

## 2022-07-03 PROCEDURE — 6370000000 HC RX 637 (ALT 250 FOR IP): Performed by: INTERNAL MEDICINE

## 2022-07-03 PROCEDURE — C9113 INJ PANTOPRAZOLE SODIUM, VIA: HCPCS | Performed by: INTERNAL MEDICINE

## 2022-07-03 PROCEDURE — 6370000000 HC RX 637 (ALT 250 FOR IP): Performed by: NURSE PRACTITIONER

## 2022-07-03 PROCEDURE — 6360000002 HC RX W HCPCS: Performed by: INTERNAL MEDICINE

## 2022-07-03 PROCEDURE — 99024 POSTOP FOLLOW-UP VISIT: CPT | Performed by: SURGERY

## 2022-07-03 PROCEDURE — 2580000003 HC RX 258: Performed by: INTERNAL MEDICINE

## 2022-07-03 PROCEDURE — 2000000000 HC ICU R&B

## 2022-07-03 RX ADMIN — PIPERACILLIN AND TAZOBACTAM 3375 MG: 3; .375 INJECTION, POWDER, LYOPHILIZED, FOR SOLUTION INTRAVENOUS at 13:00

## 2022-07-03 RX ADMIN — METOCLOPRAMIDE 10 MG: 5 INJECTION, SOLUTION INTRAMUSCULAR; INTRAVENOUS at 01:56

## 2022-07-03 RX ADMIN — METOPROLOL TARTRATE 12.5 MG: 25 TABLET, FILM COATED ORAL at 09:29

## 2022-07-03 RX ADMIN — METOPROLOL TARTRATE 12.5 MG: 25 TABLET, FILM COATED ORAL at 20:46

## 2022-07-03 RX ADMIN — METOCLOPRAMIDE 10 MG: 5 INJECTION, SOLUTION INTRAMUSCULAR; INTRAVENOUS at 08:30

## 2022-07-03 RX ADMIN — ENOXAPARIN SODIUM 40 MG: 100 INJECTION SUBCUTANEOUS at 20:46

## 2022-07-03 RX ADMIN — PIPERACILLIN AND TAZOBACTAM 3375 MG: 3; .375 INJECTION, POWDER, LYOPHILIZED, FOR SOLUTION INTRAVENOUS at 20:52

## 2022-07-03 RX ADMIN — ONDANSETRON 4 MG: 2 INJECTION INTRAMUSCULAR; INTRAVENOUS at 08:30

## 2022-07-03 RX ADMIN — METOCLOPRAMIDE 10 MG: 5 INJECTION, SOLUTION INTRAMUSCULAR; INTRAVENOUS at 20:46

## 2022-07-03 RX ADMIN — AMITRIPTYLINE HYDROCHLORIDE 12.5 MG: 25 TABLET, FILM COATED ORAL at 20:46

## 2022-07-03 RX ADMIN — PIPERACILLIN AND TAZOBACTAM 3375 MG: 3; .375 INJECTION, POWDER, LYOPHILIZED, FOR SOLUTION INTRAVENOUS at 05:56

## 2022-07-03 RX ADMIN — SERTRALINE 25 MG: 25 TABLET, FILM COATED ORAL at 09:29

## 2022-07-03 RX ADMIN — Medication 10 ML: at 21:12

## 2022-07-03 RX ADMIN — AMIODARONE HYDROCHLORIDE 200 MG: 200 TABLET ORAL at 09:29

## 2022-07-03 RX ADMIN — LEVOTHYROXINE SODIUM 112 MCG: 0.11 TABLET ORAL at 05:55

## 2022-07-03 RX ADMIN — PANTOPRAZOLE SODIUM 40 MG: 40 INJECTION, POWDER, LYOPHILIZED, FOR SOLUTION INTRAVENOUS at 08:30

## 2022-07-03 RX ADMIN — METOCLOPRAMIDE 10 MG: 5 INJECTION, SOLUTION INTRAMUSCULAR; INTRAVENOUS at 14:30

## 2022-07-03 NOTE — PROGRESS NOTES
ADVANCED CARE PLANNING    Name:Stuart Almaguer       :  1945              MRN:  [id number]      Purpose of Encounter: Advanced care planning in light of problem listed above   Parties in attendance: :Arya Plascencia MD, Family members:  Decisional Capacity:Yes    Diagnosis: Principal Problem:    Cecal volvulus ([facility] [address]  Active Problems:    LV dysfunction    AMPARO (acute kidney injury) ([facility] [address]    Acquired hypothyroidism    GERD (gastroesophageal reflux disease)    Atrial fibrillation ([facility] [address]    Chronic combined systolic (congestive) and diastolic (congestive) heart failure ([facility] [address]  Resolved Problems:    * No resolved hospital problems. *    Patients Medical Story:Presented with worsening symptom of dx above. With at risk for life threatening event. Procedure and testing as noted in progress noted. We discussed patient long term goal and also wishes and aggressive care. Discussed in detail about code status and what it means with detailed explanation. Goals of Care Determinations: Patient wishes to focus on full code with aggressive care, CPR, intubation long term vent and facility as well. Plan: Will notify CARLIE Lopez CNP of change in care plan. Will look at further interventions as needed. Code Status: At this time patient wishes to be Full Code  Time Spent with Patient: 21 minutes      Electronically signed by Arya Owusu MD on 7/3/2022 at 5:32 PM  Thank you CARLIE Lopez CNP for the opportunity to be involved in this patient's care.

## 2022-07-03 NOTE — PROGRESS NOTES
Uneventful night. VSS, slept well, no episode of nausea, which is well controlled by scheduled Reglan. Tolerates ambulation well. No BM yet.

## 2022-07-03 NOTE — PROGRESS NOTES
Rosamaria Calix is a 65 y.o. female patient.     Current Facility-Administered Medications   Medication Dose Route Frequency Provider Last Rate Last Admin    levothyroxine (SYNTHROID) tablet 112 mcg  112 mcg Oral Daily Emma Olsen MD   112 mcg at 07/03/22 0555    amitriptyline (ELAVIL) tablet 12.5 mg  12.5 mg Oral Nightly Coretta Black MD   12.5 mg at 07/02/22 2103    sertraline (ZOLOFT) tablet 25 mg  25 mg Oral Daily Coretta Black MD   25 mg at 07/03/22 0929    metoclopramide (REGLAN) injection 10 mg  10 mg IntraVENous Q6H Jarek De Santiago MD   10 mg at 07/03/22 0830    amiodarone (CORDARONE) 300 mg in dextrose 5 % 100 mL bolus  300 mg IntraVENous Once CARLIE De La O CNP        amiodarone (CORDARONE) tablet 200 mg  200 mg Oral Daily Amy Lebron MD   200 mg at 07/03/22 0929    metoprolol tartrate (LOPRESSOR) tablet 12.5 mg  12.5 mg Oral BID CARLIE De La O CNP   12.5 mg at 07/03/22 0929    dilTIAZem (CARDIZEM) 125 mg in dextrose 5% 125 mL infusion (premix)  2.5-15 mg/hr IntraVENous Continuous Katarzyna Brown MD   Stopped at 07/01/22 0700    diazePAM (VALIUM) injection 2.5 mg  2.5 mg IntraVENous Q8H PRN Mervat Garcia MD        magnesium sulfate 2000 mg in 50 mL IVPB premix  2,000 mg IntraVENous PRN Lita L Dechristophbobbi, APRN - CNP        sodium phosphate 10 mmol in sodium chloride 0.9 % 250 mL IVPB  10 mmol IntraVENous PRN Maria Del Carmena L DechristopherCARLIE - CNP        Or    sodium phosphate 15 mmol in sodium chloride 0.9 % 250 mL IVPB  15 mmol IntraVENous PRN Lita L Dechristophbobbi, APRN - CNP        Or    sodium phosphate 20 mmol in sodium chloride 0.9 % 500 mL IVPB  20 mmol IntraVENous PRN Maria Del Carmena L Dechristophbobbi APRLILIA - CNP        piperacillin-tazobactam (ZOSYN) 3,375 mg in dextrose 5 % 50 mL IVPB (mini-bag)  3,375 mg IntraVENous Melony Kussmaul, MD 12.5 mL/hr at 07/03/22 0556 3,375 mg at 07/03/22 0556    pantoprazole (PROTONIX) injection 40 mg 40 mg IntraVENous Daily Wendy Rdz MD   40 mg at 07/03/22 0830    sodium chloride flush 0.9 % injection 5-40 mL  5-40 mL IntraVENous 2 times per day Wendy Rdz MD   10 mL at 07/02/22 2041    sodium chloride flush 0.9 % injection 5-40 mL  5-40 mL IntraVENous PRN Wendy Rdz MD        0.9 % sodium chloride infusion   IntraVENous PRN Wendy Rdz MD        ondansetron (ZOFRAN-ODT) disintegrating tablet 4 mg  4 mg Oral Q8H PRN Wendy Rdz MD   4 mg at 07/01/22 1621    Or    ondansetron (ZOFRAN) injection 4 mg  4 mg IntraVENous Q6H PRN Wendy Rdz MD   4 mg at 07/03/22 0830    polyethylene glycol (GLYCOLAX) packet 17 g  17 g Oral Daily PRN Wendy Rdz MD   17 g at 07/01/22 1456    acetaminophen (TYLENOL) tablet 650 mg  650 mg Oral Q6H PRN Wendy Rdz MD        Or   [place] acetaminophen (TYLENOL) suppository 650 mg  650 mg Rectal Q6H PRN Wendy Rdz MD        enoxaparin (LOVENOX) injection 40 mg  40 mg SubCUTAneous Nightly Kristen Teresa MD   40 mg at 07/02/22 2103    oxyCODONE (ROXICODONE) immediate release tablet 5 mg  5 mg Oral Q6H PRN Kristen Teresa MD   5 mg at 07/01/22 2246    morphine (PF) injection 2 mg  2 mg IntraVENous Q2H PRN Kristen Teresa MD   2 mg at 07/01/22 1621    Or    morphine injection 4 mg  4 mg IntraVENous Q2H PRN Kristen Teresa MD   4 mg at 07/02/22 1516     Allergies   Allergen Reactions    Benadryl [Diphenhydramine]      Accelerated heart rate     Principal Problem:    Cecal volvulus ([address]  Active Problems:    LV dysfunction    AMPARO (acute kidney injury) ([address]    Acquired hypothyroidism    GERD (gastroesophageal reflux disease)    Atrial fibrillation (HCC)    Chronic combined systolic (congestive) and diastolic (congestive) heart failure ([address]  Resolved Problems:    * No resolved hospital problems. *    Blood pressure (!) 141/78, pulse 68, temperature 96.8 °F (36 °C), temperature source Temporal, resp.  rate 18, height 5' 6\" (1.676 m), weight 256 lb 13.4 oz (116.5 kg), SpO2 94 %, not currently breastfeeding. Subjective:  Symptoms:  Stable. Diet:  NPO. Activity level: Normal.    Pain:  She complains of pain that is mild. Objective:  General Appearance:  Comfortable. Vital signs: (most recent): Blood pressure (!) 141/78, pulse 68, temperature 96.8 °F (36 °C), temperature source Temporal, resp. rate 18, height 5' 6\" (1.676 m), weight 256 lb 13.4 oz (116.5 kg), SpO2 94 %, not currently breastfeeding. Output: Producing urine and no stool output. Lungs:  Normal effort and normal respiratory rate. Abdomen: Abdomen is soft and non-distended. (GINGER dressing in place). Neurological: Patient is alert and oriented to person, place and time. Skin:  Warm and dry. Assessment & Plan 65year old female who is POD # 6 S/P right hemicolectomy for cecal volvulus. Doing a little bit better today. No further vomiting. Nausea has largely resolved also. Passed a small amount of flatus. Continue sips of liquids/popsicles only today. Increase ambulation. Hopefully should be ready to start advancing diet tomorrow.       Jose Smith MD  7/3/2022

## 2022-07-03 NOTE — PROGRESS NOTES
[facility]    7/3/2022 4:42 PM        Name: Marisa Milligan . Admitted: 6/27/2022  Primary Care Provider: CARLIE Bustamante CNP (Tel: [phone])                        Subjective:  . No acute events overnight. Resting well. Pain control. Diet ok. Labs reviewed  Denies any chest pain sob.      Reviewed interval ancillary notes    Current Medications  levothyroxine (SYNTHROID) tablet 112 mcg, Daily  amitriptyline (ELAVIL) tablet 12.5 mg, Nightly  sertraline (ZOLOFT) tablet 25 mg, Daily  metoclopramide (REGLAN) injection 10 mg, Q6H  amiodarone (CORDARONE) 300 mg in dextrose 5 % 100 mL bolus, Once  amiodarone (CORDARONE) tablet 200 mg, Daily  metoprolol tartrate (LOPRESSOR) tablet 12.5 mg, BID  dilTIAZem (CARDIZEM) 125 mg in dextrose 5% 125 mL infusion (premix), Continuous  diazePAM (VALIUM) injection 2.5 mg, Q8H PRN  magnesium sulfate 2000 mg in 50 mL IVPB premix, PRN  sodium phosphate 10 mmol in sodium chloride 0.9 % 250 mL IVPB, PRN   Or  sodium phosphate 15 mmol in sodium chloride 0.9 % 250 mL IVPB, PRN   Or  sodium phosphate 20 mmol in sodium chloride 0.9 % 500 mL IVPB, PRN  piperacillin-tazobactam (ZOSYN) 3,375 mg in dextrose 5 % 50 mL IVPB (mini-bag), Q8H  pantoprazole (PROTONIX) injection 40 mg, Daily  sodium chloride flush 0.9 % injection 5-40 mL, 2 times per day  sodium chloride flush 0.9 % injection 5-40 mL, PRN  0.9 % sodium chloride infusion, PRN  ondansetron (ZOFRAN-ODT) disintegrating tablet 4 mg, Q8H PRN   Or  ondansetron (ZOFRAN) injection 4 mg, Q6H PRN  polyethylene glycol (GLYCOLAX) packet 17 g, Daily PRN  acetaminophen (TYLENOL) tablet 650 mg, Q6H PRN   Or  acetaminophen (TYLENOL) suppository 650 mg, Q6H PRN  enoxaparin (LOVENOX) injection 40 mg, Nightly  oxyCODONE (ROXICODONE) immediate release tablet 5 mg, Q6H PRN  morphine (PF) injection 2 mg, Q2H PRN   Or  morphine injection 4 mg, Q2H PRN        Objective:  BP (!) 141/78   Pulse 68   Temp 96.8 °F (36 °C) (Temporal)   Resp 18   Ht 5' 6\" (1.676 m)   Wt 256 lb 13.4 oz (116.5 kg)   SpO2 94%   BMI 41.45 kg/m²     Intake/Output Summary (Last 24 hours) at 7/3/2022 1642  Last data filed at 7/2/2022 2057  Gross per 24 hour   Intake 10 ml   Output 200 ml   Net -190 ml      Wt Readings from Last 3 Encounters:   07/03/22 256 lb 13.4 oz (116.5 kg)   03/24/22 254 lb 12.8 oz (115.6 kg)   09/28/21 250 lb (113.4 kg)       General appearance:  Appears comfortable  Eyes: Sclera clear. Pupils equal.  ENT: Moist oral mucosa. Trachea midline, no adenopathy. Cardiovascular: Regular rhythm, normal S1, S2. No murmur. No edema in lower extremities  Respiratory: Not using accessory muscles. Good inspiratory effort. Clear to auscultation bilaterally, no wheeze or crackles. GI: Abdomen soft, no tenderness, not distended, normal bowel sounds  Musculoskeletal: No cyanosis in digits, neck supple  Neurology: CN 2-12 grossly intact. No speech or motor deficits  Psych: Normal affect. Alert and oriented in time, place and person  Skin: Warm, dry, normal turgor    Labs and Tests:  CBC:   Recent Labs     07/01/22  1045   WBC 8.5   HGB 13.1        BMP:    Recent Labs     07/01/22  1045      K 4.2      CO2 27   BUN 13   CREATININE 1.1   GLUCOSE 98     Hepatic: No results for input(s): AST, ALT, ALB, BILITOT, ALKPHOS in the last 72 hours. Discussed care with patient             Problem List  Principal Problem:    Cecal volvulus ([address]  Active Problems:    LV dysfunction    AMPARO (acute kidney injury) ([address]    Acquired hypothyroidism    GERD (gastroesophageal reflux disease)    Atrial fibrillation (HCC)    Chronic combined systolic (congestive) and diastolic (congestive) heart failure ([address]  Resolved Problems:    * No resolved hospital problems. *       Assessment & Plan:   1.  Cecal volvulus  -Continue management per general surgery.  -Doing well. Had some nausea this morning  -Still having some nausea. But improving  --Tolerated. -New management per general surgery      A. fib with RVR  -Likely multifactorial.    --Patient is better controlled now on p.o. amiodarone.  -Management per cardiology. Chronic CHF  -Seems to be stable no volume overload. At this time we will monitor closely resume p.o. Lasix if okay      Acute kidney injury resolved            Diet: ADULT DIET;  Clear Liquid  Code:Full Code  DVT PPX lovenox       Agatha Jasso MD   7/3/2022 4:42 PM

## 2022-07-04 ENCOUNTER — APPOINTMENT (OUTPATIENT)
Dept: GENERAL RADIOLOGY | Age: 77
DRG: 853 | End: 2022-07-04
Payer: MEDICARE

## 2022-07-04 PROCEDURE — 6360000002 HC RX W HCPCS: Performed by: INTERNAL MEDICINE

## 2022-07-04 PROCEDURE — 2580000003 HC RX 258: Performed by: SURGERY

## 2022-07-04 PROCEDURE — 6360000002 HC RX W HCPCS: Performed by: SURGERY

## 2022-07-04 PROCEDURE — 99024 POSTOP FOLLOW-UP VISIT: CPT | Performed by: SURGERY

## 2022-07-04 PROCEDURE — 2580000003 HC RX 258: Performed by: INTERNAL MEDICINE

## 2022-07-04 PROCEDURE — 6370000000 HC RX 637 (ALT 250 FOR IP): Performed by: INTERNAL MEDICINE

## 2022-07-04 PROCEDURE — C9113 INJ PANTOPRAZOLE SODIUM, VIA: HCPCS | Performed by: INTERNAL MEDICINE

## 2022-07-04 PROCEDURE — 6370000000 HC RX 637 (ALT 250 FOR IP): Performed by: HOSPITALIST

## 2022-07-04 PROCEDURE — 74019 RADEX ABDOMEN 2 VIEWS: CPT

## 2022-07-04 PROCEDURE — 6370000000 HC RX 637 (ALT 250 FOR IP): Performed by: NURSE PRACTITIONER

## 2022-07-04 PROCEDURE — 2000000000 HC ICU R&B

## 2022-07-04 RX ORDER — BISACODYL 10 MG
10 SUPPOSITORY, RECTAL RECTAL DAILY PRN
Status: DISCONTINUED | OUTPATIENT
Start: 2022-07-04 | End: 2022-07-07

## 2022-07-04 RX ADMIN — Medication 10 ML: at 20:17

## 2022-07-04 RX ADMIN — ENOXAPARIN SODIUM 40 MG: 100 INJECTION SUBCUTANEOUS at 20:16

## 2022-07-04 RX ADMIN — LEVOTHYROXINE SODIUM 112 MCG: 0.11 TABLET ORAL at 07:05

## 2022-07-04 RX ADMIN — METOCLOPRAMIDE 10 MG: 5 INJECTION, SOLUTION INTRAMUSCULAR; INTRAVENOUS at 20:14

## 2022-07-04 RX ADMIN — AMIODARONE HYDROCHLORIDE 200 MG: 200 TABLET ORAL at 09:12

## 2022-07-04 RX ADMIN — SERTRALINE 25 MG: 25 TABLET, FILM COATED ORAL at 09:12

## 2022-07-04 RX ADMIN — Medication 10 ML: at 09:12

## 2022-07-04 RX ADMIN — PANTOPRAZOLE SODIUM 40 MG: 40 INJECTION, POWDER, LYOPHILIZED, FOR SOLUTION INTRAVENOUS at 09:11

## 2022-07-04 RX ADMIN — PIPERACILLIN AND TAZOBACTAM 3375 MG: 3; .375 INJECTION, POWDER, LYOPHILIZED, FOR SOLUTION INTRAVENOUS at 04:54

## 2022-07-04 RX ADMIN — METOCLOPRAMIDE 10 MG: 5 INJECTION, SOLUTION INTRAMUSCULAR; INTRAVENOUS at 07:05

## 2022-07-04 RX ADMIN — PIPERACILLIN AND TAZOBACTAM 3375 MG: 3; .375 INJECTION, POWDER, LYOPHILIZED, FOR SOLUTION INTRAVENOUS at 14:36

## 2022-07-04 RX ADMIN — AMITRIPTYLINE HYDROCHLORIDE 12.5 MG: 25 TABLET, FILM COATED ORAL at 20:16

## 2022-07-04 RX ADMIN — PIPERACILLIN AND TAZOBACTAM 3375 MG: 3; .375 INJECTION, POWDER, LYOPHILIZED, FOR SOLUTION INTRAVENOUS at 20:18

## 2022-07-04 RX ADMIN — METOCLOPRAMIDE 10 MG: 5 INJECTION, SOLUTION INTRAMUSCULAR; INTRAVENOUS at 00:58

## 2022-07-04 RX ADMIN — MORPHINE SULFATE 2 MG: 2 INJECTION, SOLUTION INTRAMUSCULAR; INTRAVENOUS at 00:58

## 2022-07-04 RX ADMIN — METOPROLOL TARTRATE 12.5 MG: 25 TABLET, FILM COATED ORAL at 09:12

## 2022-07-04 RX ADMIN — METOPROLOL TARTRATE 12.5 MG: 25 TABLET, FILM COATED ORAL at 20:14

## 2022-07-04 ASSESSMENT — PAIN SCALES - GENERAL: PAINLEVEL_OUTOF10: 0

## 2022-07-04 NOTE — PROGRESS NOTES
[facility]    7/4/2022 4:48 PM        Name: Jethro Willingham . Admitted: 6/27/2022  Primary Care Provider: CARLIE Cespedes CNP (Tel: [phone])                        Subjective:  . No acute events overnight. Resting well. Pain control. Diet ok. Labs reviewed  Denies any chest pain sob.      Reviewed interval ancillary notes    Current Medications  bisacodyl (DULCOLAX) suppository 10 mg, Daily PRN  levothyroxine (SYNTHROID) tablet 112 mcg, Daily  amitriptyline (ELAVIL) tablet 12.5 mg, Nightly  sertraline (ZOLOFT) tablet 25 mg, Daily  metoclopramide (REGLAN) injection 10 mg, Q6H  amiodarone (CORDARONE) 300 mg in dextrose 5 % 100 mL bolus, Once  amiodarone (CORDARONE) tablet 200 mg, Daily  metoprolol tartrate (LOPRESSOR) tablet 12.5 mg, BID  dilTIAZem (CARDIZEM) 125 mg in dextrose 5% 125 mL infusion (premix), Continuous  diazePAM (VALIUM) injection 2.5 mg, Q8H PRN  magnesium sulfate 2000 mg in 50 mL IVPB premix, PRN  sodium phosphate 10 mmol in sodium chloride 0.9 % 250 mL IVPB, PRN   Or  sodium phosphate 15 mmol in sodium chloride 0.9 % 250 mL IVPB, PRN   Or  sodium phosphate 20 mmol in sodium chloride 0.9 % 500 mL IVPB, PRN  piperacillin-tazobactam (ZOSYN) 3,375 mg in dextrose 5 % 50 mL IVPB (mini-bag), Q8H  pantoprazole (PROTONIX) injection 40 mg, Daily  sodium chloride flush 0.9 % injection 5-40 mL, 2 times per day  sodium chloride flush 0.9 % injection 5-40 mL, PRN  0.9 % sodium chloride infusion, PRN  ondansetron (ZOFRAN-ODT) disintegrating tablet 4 mg, Q8H PRN   Or  ondansetron (ZOFRAN) injection 4 mg, Q6H PRN  polyethylene glycol (GLYCOLAX) packet 17 g, Daily PRN  acetaminophen (TYLENOL) tablet 650 mg, Q6H PRN   Or  acetaminophen (TYLENOL) suppository 650 mg, Q6H PRN  enoxaparin (LOVENOX) injection 40 mg, Nightly  oxyCODONE (ROXICODONE) immediate release tablet 5 mg, Q6H PRN  morphine (PF) injection 2 mg, Q2H PRN   Or  morphine injection 4 mg, Q2H PRN        Objective:  BP (!) 148/74   Pulse 67   Temp (!) 96.5 °F (35.8 °C) (Temporal)   Resp 18   Ht 5' 6\" (1.676 m)   Wt 251 lb 8.7 oz (114.1 kg)   SpO2 96%   BMI 40.60 kg/m²     Intake/Output Summary (Last 24 hours) at 7/4/2022 1648  Last data filed at 7/4/2022 1436  Gross per 24 hour   Intake 315.55 ml   Output 650 ml   Net -334.45 ml      Wt Readings from Last 3 Encounters:   07/04/22 251 lb 8.7 oz (114.1 kg)   03/24/22 254 lb 12.8 oz (115.6 kg)   09/28/21 250 lb (113.4 kg)       General appearance:  Appears comfortable  Eyes: Sclera clear. Pupils equal.  ENT: Moist oral mucosa. Trachea midline, no adenopathy. Cardiovascular: Regular rhythm, normal S1, S2. No murmur. No edema in lower extremities  Respiratory: Not using accessory muscles. Good inspiratory effort. Clear to auscultation bilaterally, no wheeze or crackles. GI: Abdomen soft, no tenderness, not distended, normal bowel sounds  Musculoskeletal: No cyanosis in digits, neck supple  Neurology: CN 2-12 grossly intact. No speech or motor deficits  Psych: Normal affect. Alert and oriented in time, place and person  Skin: Warm, dry, normal turgor    Labs and Tests:  CBC:   No results for input(s): WBC, HGB, PLT in the last 72 hours. BMP:    No results for input(s): NA, K, CL, CO2, BUN, CREATININE, GLUCOSE in the last 72 hours. Hepatic: No results for input(s): AST, ALT, ALB, BILITOT, ALKPHOS in the last 72 hours. Discussed care with patient             Problem List  Principal Problem:    Cecal volvulus ([facility]  Active Problems:    LV dysfunction    AMPARO (acute kidney injury) ([address]    Acquired hypothyroidism    GERD (gastroesophageal reflux disease)    Atrial fibrillation (HCC)    Chronic combined systolic (congestive) and diastolic (congestive) heart failure ([address]  Resolved Problems:    * No resolved hospital problems.  * Assessment & Plan:   1. Cecal volvulus  -Continue management per general surgery.  -Doing well. Had some nausea this morning  - ient   - had BM   - ambulate patieht  - per gen surgery      A. fib with RVR  -Likely multifactorial.    --Patient is better controlled now on p.o. amiodarone.  -Management per cardiology. Chronic CHF  -Seems to be stable no volume overload. At this time we will monitor closely resume p.o. Lasix if okay      Acute kidney injury resolved      Can transfer out of CVU      Diet: ADULT DIET;  Clear Liquid  Code:Full Code  DVT PPX lovenox       Morena Case MD   7/4/2022 4:48 PM

## 2022-07-04 NOTE — PROGRESS NOTES
Jethro Willingham is a 65 y.o. female patient.     Current Facility-Administered Medications   Medication Dose Route Frequency Provider Last Rate Last Admin    levothyroxine (SYNTHROID) tablet 112 mcg  112 mcg Oral Daily Emma Olsen MD   112 mcg at 07/04/22 0705    amitriptyline (ELAVIL) tablet 12.5 mg  12.5 mg Oral Nightly Ronan Verma MD   12.5 mg at 07/03/22 2046    sertraline (ZOLOFT) tablet 25 mg  25 mg Oral Daily Ronan Verma MD   25 mg at 07/04/22 0912    metoclopramide (REGLAN) injection 10 mg  10 mg IntraVENous Q6H Jana Driscoll MD   10 mg at 07/04/22 1375    amiodarone (CORDARONE) 300 mg in dextrose 5 % 100 mL bolus  300 mg IntraVENous Once Kip CARLIE Mensah - SVITLANA        amiodarone (CORDARONE) tablet 200 mg  200 mg Oral Daily Madhuri Hansen MD   200 mg at 07/04/22 0912    metoprolol tartrate (LOPRESSOR) tablet 12.5 mg  12.5 mg Oral BID Kip CARLIE Mensah - CNP   12.5 mg at 07/04/22 0912    dilTIAZem (CARDIZEM) 125 mg in dextrose 5% 125 mL infusion (premix)  2.5-15 mg/hr IntraVENous Continuous Rj Sun MD   Stopped at 07/01/22 0700    diazePAM (VALIUM) injection 2.5 mg  2.5 mg IntraVENous Q8H PRN Freddy rGaf MD        magnesium sulfate 2000 mg in 50 mL IVPB premix  2,000 mg IntraVENous PRN Maria Del Carmena L Dechristopher, APRN - CNP        sodium phosphate 10 mmol in sodium chloride 0.9 % 250 mL IVPB  10 mmol IntraVENous PRN Madonna L Dechristopher, APRN - CNP        Or    sodium phosphate 15 mmol in sodium chloride 0.9 % 250 mL IVPB  15 mmol IntraVENous PRN Madonna L Dechristopher, APRN - CNP        Or    sodium phosphate 20 mmol in sodium chloride 0.9 % 500 mL IVPB  20 mmol IntraVENous PRN Madonna L Dechristopher, APRN - CNP        piperacillin-tazobactam (ZOSYN) 3,375 mg in dextrose 5 % 50 mL IVPB (mini-bag)  3,375 mg IntraVENous Deni , MD   Stopped at 07/04/22 0900    pantoprazole (PROTONIX) injection 40 mg  40 mg IntraVENous Daily Nancy Burnett MD   40 mg at 07/04/22 0911    sodium chloride flush 0.9 % injection 5-40 mL  5-40 mL IntraVENous 2 times per day Nancy Burnett MD   10 mL at 07/04/22 0912    sodium chloride flush 0.9 % injection 5-40 mL  5-40 mL IntraVENous PRN Nancy Burnett MD        0.9 % sodium chloride infusion   IntraVENous PRN Nancy Burnett MD        ondansetron (ZOFRAN-ODT) disintegrating tablet 4 mg  4 mg Oral Q8H PRN Nnacy Burnett MD   4 mg at 07/01/22 1621    Or    ondansetron (ZOFRAN) injection 4 mg  4 mg IntraVENous Q6H PRN Nancy Burnett MD   4 mg at 07/03/22 0830    polyethylene glycol (GLYCOLAX) packet 17 g  17 g Oral Daily PRN Nancy Burnett MD   17 g at 07/01/22 1456    acetaminophen (TYLENOL) tablet 650 mg  650 mg Oral Q6H PRN Nancy Burnett MD        Or   [place] acetaminophen (TYLENOL) suppository 650 mg  650 mg Rectal Q6H PRN Nancy Burnett MD        enoxaparin (LOVENOX) injection 40 mg  40 mg SubCUTAneous Nightly Clotilde Clark MD   40 mg at 07/03/22 2046    oxyCODONE (ROXICODONE) immediate release tablet 5 mg  5 mg Oral Q6H PRN Clotilde Clark MD   5 mg at 07/01/22 2246    morphine (PF) injection 2 mg  2 mg IntraVENous Q2H PRN Clotilde Clark MD   2 mg at 07/04/22 0058    Or    morphine injection 4 mg  4 mg IntraVENous Q2H PRN Clotilde Clark MD   4 mg at 07/02/22 1516     Allergies   Allergen Reactions    Benadryl [Diphenhydramine]      Accelerated heart rate     Principal Problem:    Cecal volvulus ([address]  Active Problems:    LV dysfunction    AMPARO (acute kidney injury) ([address]    Acquired hypothyroidism    GERD (gastroesophageal reflux disease)    Atrial fibrillation (HCC)    Chronic combined systolic (congestive) and diastolic (congestive) heart failure ([address]  Resolved Problems:    * No resolved hospital problems. *    Blood pressure 117/73, pulse 61, temperature (!) 96.7 °F (35.9 °C), temperature source Temporal, resp.  rate 17, height 5' 6\" (1.676 m), weight 251 lb 8.7 oz (114.1 kg), SpO2 94 %, not currently breastfeeding. Subjective:  Diet:  Poor intake. Activity level: Impaired due to weakness. Pain:  She reports no pain. Objective:  General Appearance:  Comfortable. Vital signs: (most recent): Blood pressure 117/73, pulse 61, temperature (!) 96.7 °F (35.9 °C), temperature source Temporal, resp. rate 17, height 5' 6\" (1.676 m), weight 251 lb 8.7 oz (114.1 kg), SpO2 94 %, not currently breastfeeding. Output: Producing urine. Stool output assessment: Had small bowel movement. Lungs:  Normal effort and normal respiratory rate. Abdomen: Abdomen is soft and non-distended. (Incision approximated without evidence of infection). Neurological: Patient is alert and oriented to person, place and time. Skin:  Warm and dry. Assessment & Plan 68-year-old female who is postop day #7 status post right hemicolectomy for cecal volvulus. Appetite remains poor. She did have a small bowel movement overnight. Abdomen is soft without distention. She is not progressing as quickly as expected. Will check abdominal x-rays. Continue IV Reglan. Try Dulcolax suppositories. Needs to increase ambulation.     Binh Parra MD  7/4/2022

## 2022-07-05 LAB
ANION GAP SERPL CALCULATED.3IONS-SCNC: 11 MMOL/L (ref 3–16)
BACTERIA: ABNORMAL /HPF
BASOPHILS ABSOLUTE: 0.1 K/UL (ref 0–0.2)
BASOPHILS RELATIVE PERCENT: 0.7 %
BILIRUBIN URINE: ABNORMAL
BLOOD, URINE: ABNORMAL
BUN BLDV-MCNC: 15 MG/DL (ref 7–20)
CALCIUM SERPL-MCNC: 10 MG/DL (ref 8.3–10.6)
CHLORIDE BLD-SCNC: 100 MMOL/L (ref 99–110)
CLARITY: CLEAR
CO2: 27 MMOL/L (ref 21–32)
COLOR: ABNORMAL
CREAT SERPL-MCNC: 1 MG/DL (ref 0.6–1.2)
EOSINOPHILS ABSOLUTE: 0.4 K/UL (ref 0–0.6)
EOSINOPHILS RELATIVE PERCENT: 4.8 %
EPITHELIAL CELLS, UA: 15 /HPF (ref 0–5)
GFR AFRICAN AMERICAN: >60
GFR NON-AFRICAN AMERICAN: 54
GLUCOSE BLD-MCNC: 104 MG/DL (ref 70–99)
GLUCOSE URINE: NEGATIVE MG/DL
HCT VFR BLD CALC: 41 % (ref 36–48)
HEMOGLOBIN: 13.2 G/DL (ref 12–16)
HYALINE CASTS: 2 /LPF (ref 0–8)
KETONES, URINE: 15 MG/DL
LEUKOCYTE ESTERASE, URINE: NEGATIVE
LYMPHOCYTES ABSOLUTE: 0.7 K/UL (ref 1–5.1)
LYMPHOCYTES RELATIVE PERCENT: 8.1 %
MAGNESIUM: 2 MG/DL (ref 1.8–2.4)
MCH RBC QN AUTO: 28.3 PG (ref 26–34)
MCHC RBC AUTO-ENTMCNC: 32.3 G/DL (ref 31–36)
MCV RBC AUTO: 87.8 FL (ref 80–100)
MICROSCOPIC EXAMINATION: YES
MONOCYTES ABSOLUTE: 0.9 K/UL (ref 0–1.3)
MONOCYTES RELATIVE PERCENT: 10.7 %
NEUTROPHILS ABSOLUTE: 6.7 K/UL (ref 1.7–7.7)
NEUTROPHILS RELATIVE PERCENT: 75.7 %
NITRITE, URINE: NEGATIVE
PDW BLD-RTO: 13.9 % (ref 12.4–15.4)
PH UA: 5.5 (ref 5–8)
PHOSPHORUS: 2.6 MG/DL (ref 2.5–4.9)
PLATELET # BLD: 326 K/UL (ref 135–450)
PMV BLD AUTO: 7.7 FL (ref 5–10.5)
POTASSIUM SERPL-SCNC: 3.9 MMOL/L (ref 3.5–5.1)
PROTEIN UA: ABNORMAL MG/DL
RBC # BLD: 4.67 M/UL (ref 4–5.2)
RBC UA: 5 /HPF (ref 0–4)
SODIUM BLD-SCNC: 138 MMOL/L (ref 136–145)
SPECIFIC GRAVITY UA: 1.02 (ref 1–1.03)
URINE REFLEX TO CULTURE: ABNORMAL
URINE TYPE: ABNORMAL
UROBILINOGEN, URINE: 1 E.U./DL
WBC # BLD: 8.9 K/UL (ref 4–11)
WBC UA: 5 /HPF (ref 0–5)

## 2022-07-05 PROCEDURE — C9113 INJ PANTOPRAZOLE SODIUM, VIA: HCPCS | Performed by: INTERNAL MEDICINE

## 2022-07-05 PROCEDURE — 84100 ASSAY OF PHOSPHORUS: CPT

## 2022-07-05 PROCEDURE — 6360000002 HC RX W HCPCS: Performed by: SURGERY

## 2022-07-05 PROCEDURE — 6360000002 HC RX W HCPCS: Performed by: INTERNAL MEDICINE

## 2022-07-05 PROCEDURE — 6370000000 HC RX 637 (ALT 250 FOR IP): Performed by: HOSPITALIST

## 2022-07-05 PROCEDURE — 83735 ASSAY OF MAGNESIUM: CPT

## 2022-07-05 PROCEDURE — 2580000003 HC RX 258: Performed by: SURGERY

## 2022-07-05 PROCEDURE — 80048 BASIC METABOLIC PNL TOTAL CA: CPT

## 2022-07-05 PROCEDURE — 99024 POSTOP FOLLOW-UP VISIT: CPT | Performed by: SURGERY

## 2022-07-05 PROCEDURE — 85025 COMPLETE CBC W/AUTO DIFF WBC: CPT

## 2022-07-05 PROCEDURE — 81001 URINALYSIS AUTO W/SCOPE: CPT

## 2022-07-05 PROCEDURE — 6370000000 HC RX 637 (ALT 250 FOR IP): Performed by: NURSE PRACTITIONER

## 2022-07-05 PROCEDURE — 6370000000 HC RX 637 (ALT 250 FOR IP): Performed by: INTERNAL MEDICINE

## 2022-07-05 PROCEDURE — 2000000000 HC ICU R&B

## 2022-07-05 PROCEDURE — 97530 THERAPEUTIC ACTIVITIES: CPT

## 2022-07-05 PROCEDURE — 2580000003 HC RX 258: Performed by: INTERNAL MEDICINE

## 2022-07-05 RX ORDER — BISACODYL 10 MG
10 SUPPOSITORY, RECTAL RECTAL ONCE
Status: DISCONTINUED | OUTPATIENT
Start: 2022-07-05 | End: 2022-07-07

## 2022-07-05 RX ORDER — MORPHINE SULFATE 2 MG/ML
2 INJECTION, SOLUTION INTRAMUSCULAR; INTRAVENOUS EVERY 4 HOURS PRN
Status: DISCONTINUED | OUTPATIENT
Start: 2022-07-05 | End: 2022-07-06

## 2022-07-05 RX ADMIN — METOCLOPRAMIDE 10 MG: 5 INJECTION, SOLUTION INTRAMUSCULAR; INTRAVENOUS at 10:03

## 2022-07-05 RX ADMIN — MORPHINE SULFATE 2 MG: 2 INJECTION, SOLUTION INTRAMUSCULAR; INTRAVENOUS at 23:56

## 2022-07-05 RX ADMIN — METOPROLOL TARTRATE 12.5 MG: 25 TABLET, FILM COATED ORAL at 10:03

## 2022-07-05 RX ADMIN — AMITRIPTYLINE HYDROCHLORIDE 12.5 MG: 25 TABLET, FILM COATED ORAL at 19:45

## 2022-07-05 RX ADMIN — METOCLOPRAMIDE 10 MG: 5 INJECTION, SOLUTION INTRAMUSCULAR; INTRAVENOUS at 15:36

## 2022-07-05 RX ADMIN — SERTRALINE 25 MG: 25 TABLET, FILM COATED ORAL at 10:03

## 2022-07-05 RX ADMIN — Medication 10 ML: at 10:18

## 2022-07-05 RX ADMIN — Medication 10 ML: at 19:46

## 2022-07-05 RX ADMIN — PANTOPRAZOLE SODIUM 40 MG: 40 INJECTION, POWDER, LYOPHILIZED, FOR SOLUTION INTRAVENOUS at 10:03

## 2022-07-05 RX ADMIN — PIPERACILLIN AND TAZOBACTAM 3375 MG: 3; .375 INJECTION, POWDER, LYOPHILIZED, FOR SOLUTION INTRAVENOUS at 04:44

## 2022-07-05 RX ADMIN — METOCLOPRAMIDE 10 MG: 5 INJECTION, SOLUTION INTRAMUSCULAR; INTRAVENOUS at 02:01

## 2022-07-05 RX ADMIN — LEVOTHYROXINE SODIUM 112 MCG: 0.11 TABLET ORAL at 06:18

## 2022-07-05 RX ADMIN — METOPROLOL TARTRATE 12.5 MG: 25 TABLET, FILM COATED ORAL at 19:45

## 2022-07-05 RX ADMIN — AMIODARONE HYDROCHLORIDE 200 MG: 200 TABLET ORAL at 10:03

## 2022-07-05 RX ADMIN — ENOXAPARIN SODIUM 40 MG: 100 INJECTION SUBCUTANEOUS at 19:45

## 2022-07-05 ASSESSMENT — PAIN DESCRIPTION - DESCRIPTORS: DESCRIPTORS: CRAMPING

## 2022-07-05 ASSESSMENT — PAIN SCALES - GENERAL
PAINLEVEL_OUTOF10: 0
PAINLEVEL_OUTOF10: 8
PAINLEVEL_OUTOF10: 0

## 2022-07-05 ASSESSMENT — PAIN DESCRIPTION - LOCATION: LOCATION: ABDOMEN

## 2022-07-05 ASSESSMENT — PAIN DESCRIPTION - ORIENTATION: ORIENTATION: MID;LOWER

## 2022-07-05 NOTE — PROGRESS NOTES
[facility]        Progress Note    Patient Name: Corey Mota  MRN: [id number]  [place]: 1945  Date of Evaluation: 2022    Subjective:  No acute events overnight  No nausea or vomiting, taking small amount of clears  Has had flatus and liquidish, nonbloody BM  Resting in chair at this time  Feels urine is reddish    Vital Signs:  Patient Vitals for the past 24 hrs:   BP Temp Temp src Pulse Resp SpO2 Height Weight   22 1003 (!) 156 -- -- -- -- -- -- --   22 0957 -- -- -- -- -- -- 5' 6\" (1.676 m) --   22 0840 (!)  (!) 96.3 °F (35.7 °C) Temporal 70 18 96 % -- --   22 0600 -- -- -- -- -- -- -- 250 lb 9.6 oz (113.7 kg)   22 0446 (!) 143/69 97 °F (36.1 °C) Temporal 62 18 95 % -- --   222 -- -- -- -- -- 94 % -- --   22 235 (!) 141/81 97.2 °F (36.2 °C) Temporal 62 18 -- -- --   22 (!) 160/77 97 °F (36.1 °C) Temporal 71 18 95 % -- --   22 1548 (!) 148/74 (!) 96.5 °F (35.8 °C) Temporal 67 18 96 % -- --   22 1158 117/73 (!) 96.7 °F (35.9 °C) Temporal 61 17 94 % -- --      TEMPERATURE HISTORY 24H: Temp (24hrs), Av.8 °F (36 °C), Min:96.3 °F (35.7 °C), Max:97.2 °F (36.2 °C)    BLOOD PRESSURE HISTORY: Systolic (74IAC), EHY:985 , Min:117 , IJZ:894    Diastolic (14QUN), NOO:78, Min:69, Max:81      Intake/Output:  I/O last 3 completed shifts: In: 315.6 [IV Piggyback:315.6]  Out: 750 [Urine:550; Emesis/NG output:200]  No intake/output data recorded. Drain/tube Output:       Physical Exam:  General: awake, alert, oriented to person, place, time  Lungs: unlabored respirations  Abdomen: soft, mildly distended, incisional tenderness only, hypoactive bowel sounds present   Skin/Wound: Clean dressing in place, no cellulitis    Labs:  CBC:    No results for input(s): WBC, HGB, HCT, PLT in the last 72 hours.   BMP:    No results for input(s): NA, K, CL, CO2, BUN, CREATININE, GLUCOSE in the last 72 hours. Hepatic:    No results for input(s): AST, ALT, ALB, BILITOT, ALKPHOS in the last 72 hours. Amylase:    Lab Results   Component Value Date/Time    AMYLASE 38 03/20/2017 05:08 PM     Lipase:    Lab Results   Component Value Date/Time    LIPASE 107.0 06/27/2022 08:57 PM    LIPASE 58.0 03/20/2017 05:08 PM    LIPASE 16.0 03/12/2017 06:07 AM      Mag:    Lab Results   Component Value Date/Time    MG 2.10 06/30/2022 08:21 AM    MG 2.00 06/30/2022 01:55 AM     Phos:   No results found for: PHOS   Coags:   Lab Results   Component Value Date/Time    PROTIME 11.8 02/18/2019 07:10 AM    INR 1.04 02/18/2019 07:10 AM    APTT 37.2 03/26/2018 08:29 PM       Cultures:  Anaerobic culture  No results found for: LABANAE  Fungus stain  No results found for requested labs within last 30 days. Gram stain  No results found for requested labs within last 30 days. Organism  Lab Results   Component Value Date/Time    ORG Klebsiella pneumoniae (A) 03/24/2022 04:07 PM     Surgical culture  No results found for: CXSURG  Blood culture 1  Results in Past 30 Days  Result Component Current Result Ref Range Previous Result Ref Range   Blood Culture, Routine No Growth after 4 days of incubation. (6/27/2022)  Not in Time Range      Blood culture 2  Results in Past 30 Days  Result Component Current Result Ref Range Previous Result Ref Range   Culture, Blood 2 No Growth after 4 days of incubation. (6/27/2022)  Not in Time Range      Fecal occult  No results found for requested labs within last 30 days. GI bacterial pathogens by PCR  No results found for requested labs within last 30 days. C. difficile  No results found for requested labs within last 30 days. Urine culture  Lab Results   Component Value Date/Time    LABURIN >100,000 CFU/ml 03/24/2022 04:07 PM       Pathology:  OR 6/27/2022--FINAL DIAGNOSIS:     Colon and terminal ileum, right hemicolectomy:   -Congestion of colonic wall consistent with history of volvulus. -Histologically unremarkable appendix.   -Negative for malignancy. Imaging:  I have personally reviewed the following films:    CT ABDOMEN PELVIS WO CONTRAST Additional Contrast? None    Result Date: 6/27/2022  EXAMINATION: CT OF THE ABDOMEN AND PELVIS WITHOUT CONTRAST 6/27/2022 8:43 pm TECHNIQUE: CT of the abdomen and pelvis was performed without the administration of intravenous contrast. Multiplanar reformatted images are provided for review. Automated exposure control, iterative reconstruction, and/or weight based adjustment of the mA/kV was utilized to reduce the radiation dose to as low as reasonably achievable. COMPARISON: None. HISTORY: ORDERING SYSTEM PROVIDED HISTORY: abd pain n/v TECHNOLOGIST PROVIDED HISTORY: Reason for exam:->abd pain n/v Additional Contrast?->None Decision Support Exception - unselect if not a suspected or confirmed emergency medical condition->Emergency Medical Condition (MA) Reason for Exam: abd pain n/v FINDINGS: Lower Chest: Scarring in the anterior right middle lobe and lingula. No acute findings. Small hiatal hernia. Organs: Evaluation of the abdominal and pelvic viscera is limited in the absence of contrast.  Subtle lobulated liver contour and widening of the fissural spaces suggestive of chronic liver disease. Cholecystectomy. The pancreas, spleen, adrenals and kidneys reveal no acute findings. There is an indeterminate 1.8 cm lesion arising from the posterosuperior pole the left kidney. GI/Bowel: Findings compatible cecal volvulus, with the dilated cecum located in the left upper quadrant measuring up to 10 cm in greatest dimension. Associated twisting of the mesentery and transition point on axial image 155. No small bowel distension. Mesenteric edema is noted. No pneumatosis or gross perforation identified. Distal colonic diverticulosis. Pelvis: No acute findings. Peritoneum/Retroperitoneum: Trace abdominopelvic ascites. No free air. No lymphadenopathy. Bones/Soft Tissues: No acute osseous abnormality identified. Multilevel degenerative disc disease and facet arthropathy with degenerative grade 1 anterolisthesis of L4.     1.  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 Associated mesenteric edema and trace abdominopelvic ascites. No pneumatosis or gross perforation. Findings were discussed with Ana Morin at 9:58 pm on 6/27/2022. 2.  Incidental 1.8 cm indeterminate left renal lesion for which nonemergent follow-up with contrast-enhanced CT or MRI renal mass protocol is recommended. 3.  Morphologic findings suggestive of chronic liver disease. 4.  Additional chronic and benign findings, as above. Scheduled Meds:   bisacodyl  10 mg Rectal Once    levothyroxine  112 mcg Oral Daily    amitriptyline  12.5 mg Oral Nightly    sertraline  25 mg Oral Daily    metoclopramide  10 mg IntraVENous Q6H    amiodarone bolus  300 mg IntraVENous Once    amiodarone  200 mg Oral Daily    metoprolol tartrate  12.5 mg Oral BID    pantoprazole  40 mg IntraVENous Daily    sodium chloride flush  5-40 mL IntraVENous 2 times per day    enoxaparin  40 mg SubCUTAneous Nightly     Continuous Infusions:   dilTIAZem Stopped (07/01/22 0700)    sodium chloride       PRN Meds:.morphine **OR** [DISCONTINUED] morphine, bisacodyl, diazePAM, magnesium sulfate, sodium phosphate IVPB **OR** sodium phosphate IVPB **OR** sodium phosphate IVPB, sodium chloride flush, sodium chloride, ondansetron **OR** ondansetron, polyethylene glycol, acetaminophen **OR** acetaminophen, oxyCODONE      Assessment:  65 y.o. female admitted with   1. Cecal volvulus ([facility] [address]    2. Septicemia ([facility] [address]    3. Volvulus ([facility] [address]        OR Date 6/27/2022, exploratory laparotomy, detorsion of volvulus, right hemicolectomy with primary ileocolonic anastomosis for cecal volvulus  Hypertension  Acute kidney injury      Plan:  1.  Try to adv diet to fulls po today, some bowel activity, decrease narcotics, cont promotility agents, laxatives  2. Check U/A. Last labs 7/1. Guido Elytes today  3. Minimal IV hydration  4. DC Antibiotics  5. Activity as tolerated, ambulate TID--PT/OT evaluation and treatment  6. Pulmonary toilet, incentive spirometry  7. PRN analgesics and antiemetics--minimizing narcotics as tolerated, continue scheduled Tylenol  8. DVT prophylaxis with Lovenox and SCD's  9. Management of medical comorbid etiologies per consulting services    EDUCATION:  Educated patient on plan of care and disease process--all questions answered.     Plans discussed with patient    Signed:      Mary Lerma MD

## 2022-07-05 NOTE — PROGRESS NOTES
[facility]    7/5/2022 12:57 PM        Name: Bhavna Nowak . Admitted: 6/27/2022  Primary Care Provider: CARLIE Arreguin CNP (Tel: [phone])                        Subjective:  . No acute events overnight. Resting well. Pain control. Diet ok. Labs reviewed  Denies any chest pain sob. Having loose stool with ostomy output.     Reviewed interval ancillary notes    Current Medications  morphine (PF) injection 2 mg, Q4H PRN  bisacodyl (DULCOLAX) suppository 10 mg, Once  bisacodyl (DULCOLAX) suppository 10 mg, Daily PRN  levothyroxine (SYNTHROID) tablet 112 mcg, Daily  amitriptyline (ELAVIL) tablet 12.5 mg, Nightly  sertraline (ZOLOFT) tablet 25 mg, Daily  metoclopramide (REGLAN) injection 10 mg, Q6H  amiodarone (CORDARONE) tablet 200 mg, Daily  metoprolol tartrate (LOPRESSOR) tablet 12.5 mg, BID  diazePAM (VALIUM) injection 2.5 mg, Q8H PRN  magnesium sulfate 2000 mg in 50 mL IVPB premix, PRN  sodium phosphate 10 mmol in sodium chloride 0.9 % 250 mL IVPB, PRN   Or  sodium phosphate 15 mmol in sodium chloride 0.9 % 250 mL IVPB, PRN   Or  sodium phosphate 20 mmol in sodium chloride 0.9 % 500 mL IVPB, PRN  pantoprazole (PROTONIX) injection 40 mg, Daily  sodium chloride flush 0.9 % injection 5-40 mL, 2 times per day  sodium chloride flush 0.9 % injection 5-40 mL, PRN  0.9 % sodium chloride infusion, PRN  ondansetron (ZOFRAN-ODT) disintegrating tablet 4 mg, Q8H PRN   Or  ondansetron (ZOFRAN) injection 4 mg, Q6H PRN  polyethylene glycol (GLYCOLAX) packet 17 g, Daily PRN  acetaminophen (TYLENOL) tablet 650 mg, Q6H PRN   Or  acetaminophen (TYLENOL) suppository 650 mg, Q6H PRN  enoxaparin (LOVENOX) injection 40 mg, Nightly  oxyCODONE (ROXICODONE) immediate release tablet 5 mg, Q6H PRN        Objective:  BP (!) 156/72   Pulse 70   Temp 97.1 °F (36.2 °C) (Temporal)   Resp 18   Ht 5' 6\" (1.676 m)   Wt 250 lb 9.6 oz (113.7 kg)   SpO2 93%   BMI 40.45 kg/m²     Intake/Output Summary (Last 24 hours) at 7/5/2022 1257  Last data filed at 7/5/2022 1132  Gross per 24 hour   Intake 465.39 ml   Output 500 ml   Net -34.61 ml      Wt Readings from Last 3 Encounters:   07/05/22 250 lb 9.6 oz (113.7 kg)   03/24/22 254 lb 12.8 oz (115.6 kg)   09/28/21 250 lb (113.4 kg)       General appearance:  Appears comfortable  Eyes: Sclera clear. Pupils equal.  ENT: Moist oral mucosa. Trachea midline, no adenopathy. Cardiovascular: Regular rhythm, normal S1, S2. No murmur. No edema in lower extremities  Respiratory: Not using accessory muscles. Good inspiratory effort. Clear to auscultation bilaterally, no wheeze or crackles. GI: Abdomen soft, no tenderness, not distended, normal bowel sounds  Musculoskeletal: No cyanosis in digits, neck supple  Neurology: CN 2-12 grossly intact. No speech or motor deficits  Psych: Normal affect. Alert and oriented in time, place and person  Skin: Warm, dry, normal turgor    Labs and Tests:  CBC:   Recent Labs     07/05/22  1148   WBC 8.9   HGB 13.2        BMP:    Recent Labs     07/05/22  1148      K 3.9      CO2 27   BUN 15   CREATININE 1.0   GLUCOSE 104*     Hepatic: No results for input(s): AST, ALT, ALB, BILITOT, ALKPHOS in the last 72 hours. Discussed care with patient             Problem List  Principal Problem:    Cecal volvulus ([address]  Active Problems:    LV dysfunction    AMPARO (acute kidney injury) ([address]    Acquired hypothyroidism    GERD (gastroesophageal reflux disease)    Atrial fibrillation (HCC)    Chronic combined systolic (congestive) and diastolic (congestive) heart failure ([address]  Resolved Problems:    * No resolved hospital problems. *       Assessment & Plan:   1.  Cecal volvulus  -Patient is status post exploratory laparotomy, detorsion of volvulus, right hemicolectomy with primary ileocolonic anastomosis for cecal volvulus  -Seems to be improving slowly. Continue management per general surgery  -DC antibiotics      A. fib with RVR  -Now rate is controlled. Continue p.o. amiodarone. Hypertension  Acute kidney injury resolved              Diet: ADULT DIET;  Full Liquid  ADULT ORAL NUTRITION SUPPLEMENT; Breakfast, Dinner; Standard High Calorie/High Protein Oral Supplement  ADULT ORAL NUTRITION SUPPLEMENT; Lunch; Clear Liquid Oral Supplement  Code:Full Code  DVT PPX lovenox       Maine Manzo MD   7/5/2022 12:57 PM

## 2022-07-05 NOTE — PROGRESS NOTES
[facility] [address] Department   Phone: [phone]    Physical Therapy    [] Initial Evaluation            [x] Daily Treatment Note         [] Discharge Summary      Patient: Romero Dumont   : 1945   MRN: [id number]   Date of Service:  2022  Admitting Diagnosis: Cecal volvulus [facility])  Current Admission Summary: 2022, exploratory laparotomy, detorsion of volvulus, right hemicolectomy with primary ileocolonic anastomosis for cecal volvulus  Past Medical History:  has a past medical history of AMPARO (acute kidney injury) ([facility] [address] Arthritis, Asthma, Atrial fibrillation ([facility] [address] Depression, Diverticulitis, DVT, GERD (gastroesophageal reflux disease), Hx of blood clots, Hypertension, Insomnia, Other disorders of kidney and ureter, Pneumonia, Shingles, and Thyroid disease. Past Surgical History:  has a past surgical history that includes Cholecystectomy, laparoscopic (03/10/2017); Cardiac surgery (); Cardioversion (2018); and hemicolectomy (N/A, 2022). Discharge Recommendations: Romero Dumont scored a 18/24 on the AM-PAC short mobility form. Current research shows that an AM-PAC score of 18 or greater is typically associated with a discharge to the patient's home setting. Based on the patient's AM-PAC score and their current functional mobility deficits, it is recommended that the patient have 2-3 sessions per week of Physical Therapy at d/c to increase the patient's independence. At this time, this patient demonstrates the endurance and safety to discharge home with HHPT and a follow up treatment frequency of 2-3x/wk. Please see assessment section for further patient specific details.     HOME HEALTH CARE: LEVEL 1 STANDARD  - Initial home health evaluation to occur within 24-48 hours, in patient home   - Therapy to evaluate with goal of regaining prior level of functioning   - Therapy to evaluate if patient has [address] needs for personal care     If patient discharges prior to next session this note will serve as a discharge summary. Please see below for the latest assessment towards goals. DME Required For Discharge: rolling walker     Precautions/Restrictions: high fall risk, modified diet, . Comment: full liquid diet  Weight Bearing Restrictions: no restrictions  [] Right Upper Extremity  [] Left Upper Extremity [] Right Lower Extremity  [] Left Lower Extremity     Required Braces/Orthotics: no braces required   [] Right  [] Left  Positional Restrictions:no positional restrictions    Pre-Admission Information   Lives With: alone (pt has friends nearby that can assist with needs as needed)    Type of Home: house  Home Layout: one level  Home Access: 1 step to enter without rails   Bathroom Layout: tub/shower unit  Toilet Height: standard height  Bathroom Equipment: grab bars in shower, hand held shower head  Home Equipment: single point cane  Transfer Assistance: Independent without use of device  Ambulation Assistance:Independent without use of device  ADL Assistance: independent with all ADL's  IADL Assistance: independent with homemaking tasks  Active :        [x] Yes  [] No  Current Employment: retired. Occupation: several jobs  Hobbies: swimming, going out with friends to eat, spend time with 12 y/o grandson  Recent Falls: Pt reports no recent falls. Subjective  General: Reclined in chair at arrival.  Agreed to therapy session. Minimal abdominal pain reported. Pain: 3/10. Location: abdomen  Pain Interventions: pain medication in place prior to arrival       Functional Mobility  Bed Mobility  Bed mobility not completed on this date. Comments:  In recliner at start and end of session   Transfers  Sit to stand transfer: stand by assistance  Stand to sit transfer: stand by assistance  Comments: SBA from recliner and from rollator   Ambulation  Surface:level surface  Assistive Device: rollator (7WRP)  Assistance: stand by assistance  Distance: 220 ft and 50 ft   Gait Mechanics: slightly widened Yuniel, slightly decreased elizabeth  Comments:  1 brief seated rest break due to fatigues. Vitals stable. Stair Mobility  Stair mobility not completed on this date. Comments:  Wheelchair Mobility:  No w/c mobility completed on this date. Comments:  Balance  Static Sitting Balance: good: independent with functional balance in unsupported position  Dynamic Sitting Balance: fair (+): maintains balance at SBA/supervision without use of UE support  Static Standing Balance: fair (-): maintains balance at SBA with use of UE support  Dynamic Standing Balance: fair (-): maintains balance at SBA with use of UE support  Comments:     Other Therapeutic Interventions    Seated LAQ 2x10 B, ankle pumps x 20. Reviewed other exercises to perform in chair throughout the day including UE ones. Functional Outcomes  AM-PAC Inpatient Mobility Raw Score : 18              Cognition  WFL  Orientation:    alert and oriented x 4  Command Following:   [facility]    Education  Barriers To Learning: none  Patient Education: patient educated on goals, PT role and benefits, plan of care, general safety, functional mobility training, transfer training, discharge recommendations  Learning Assessment:  patient verbalizes and demonstrates understanding    Assessment  Activity Tolerance: limited by endurance  Impairments Requiring Therapeutic Intervention: decreased functional mobility, decreased strength, decreased endurance, decreased balance  Prognosis: good  Clinical Assessment: Pt mostly limited by fatigue and decreased endurance. SBA for transfers and ambulation and needing an AD for mobility at this time. Continued skilled PT to promote return towards PLOF.     Safety Interventions: patient left in chair, chair alarm in place, call light within reach, gait belt, patient at risk for falls and nurse notified    Plan  Frequency: 3-5 x/per week  Current Treatment Recommendations: strengthening, balance training, functional mobility training, transfer training, gait training, stair training, endurance training, neuromuscular re-education, modalities, home exercise program, safety education, equipment evaluation/education and positioning    Goals  Patient Goals: pt did not state   Short Term Goals:  Time Frame: upon d/c  Short term goal 1: Pt will perform bed mobility MOD I   Short term goal 2: Pt will perform transfers with LRAD MOD I   Short term goal 3: Pt will ambulate 48' with LRAD and SBA-- goal met with ronda 7/5  Short term goal 4: Pt will negotiate 1 curb step with RW and CGA       Therapy Session Time      Individual Group Co-treatment   Time In 1017       Time Out 1040       Minutes 23         Timed Code Treatment Minutes:  23 Minutes  Total Treatment Minutes:  23       Electronically Signed By: Weston Pond, [id number]

## 2022-07-05 NOTE — PROGRESS NOTES
Nutrition Note    RECOMMENDATIONS  1. PO Diet: Advance diet per MD orders  2. ONS: Ordered Ensure Enlive BID and Ensure Clear once daily    NUTRITION ASSESSMENT   Pt is NPO/clear liquid diet x 8 days, clear liquid diet started 7/1, advanced to full liquid per surgery this morning. S/p ex lap with right hemicolectomy 6/27. Wt stable. Upon visiting pt this morning, reported that she is slightly starting to feel hungry, very minimal intake on clear liquid diet as was experiencing N/V a few days ago, has really only ate jello and italian ice this morning. Willing to try ONS to offer additional nutrition. RD will order and continue to monitor for diet to be advanced/adequate po intake.  Nutrition Related Findings: +0.1L. No new lytes labs since 7/1. LBM 7/4, BS hypoactive, flatus noted. Trace BUE edema, non-pitting BLE edema.  Wounds: Surgical Incision   Nutrition Education:  Education not indicated    Nutrition Goals: PO intake 50% or greater,by next RD assessment,other (specify) (diet advanced)     MALNUTRITION ASSESSMENT   Acute Illness  Malnutrition Status: At risk for malnutrition (Comment)    NUTRITION DIAGNOSIS   · Inadequate oral intake related to altered GI function as evidenced by NPO or clear liquid status due to medical condition    CURRENT NUTRITION THERAPIES  ADULT DIET; Full Liquid     PO Intake: Unable to assess (minimal documented intake)   PO Supplement Intake:None Ordered    ANTHROPOMETRICS   Current Height: 5' 6\" (167.6 cm)   Current Weight: 250 lb 9.6 oz (113.7 kg)     Admission weight: 252 lb (114.3 kg) (bed wt)   Ideal Body Weight (IBW): 130 lbs  (59 kg)        BMI: 40.4    COMPARATIVE STANDARDS  Energy (kcal):  912-1710     Protein (g):  118       Fluid (mL/day):  912-1710    The patient will be monitored per nutrition standards of care. Consult dietitian if additional nutrition interventions are needed prior to RD reassessment.      Sage Olivas MS, RD, LD    Contact: [phone]

## 2022-07-06 ENCOUNTER — APPOINTMENT (OUTPATIENT)
Dept: GENERAL RADIOLOGY | Age: 77
DRG: 853 | End: 2022-07-06
Payer: MEDICARE

## 2022-07-06 PROCEDURE — 97530 THERAPEUTIC ACTIVITIES: CPT

## 2022-07-06 PROCEDURE — C9113 INJ PANTOPRAZOLE SODIUM, VIA: HCPCS | Performed by: INTERNAL MEDICINE

## 2022-07-06 PROCEDURE — 6370000000 HC RX 637 (ALT 250 FOR IP): Performed by: NURSE PRACTITIONER

## 2022-07-06 PROCEDURE — 6370000000 HC RX 637 (ALT 250 FOR IP): Performed by: HOSPITALIST

## 2022-07-06 PROCEDURE — 97535 SELF CARE MNGMENT TRAINING: CPT

## 2022-07-06 PROCEDURE — 6360000002 HC RX W HCPCS: Performed by: SURGERY

## 2022-07-06 PROCEDURE — 97116 GAIT TRAINING THERAPY: CPT

## 2022-07-06 PROCEDURE — 2580000003 HC RX 258: Performed by: SURGERY

## 2022-07-06 PROCEDURE — 99024 POSTOP FOLLOW-UP VISIT: CPT | Performed by: SURGERY

## 2022-07-06 PROCEDURE — 2000000000 HC ICU R&B

## 2022-07-06 PROCEDURE — 2580000003 HC RX 258: Performed by: INTERNAL MEDICINE

## 2022-07-06 PROCEDURE — 6360000002 HC RX W HCPCS: Performed by: INTERNAL MEDICINE

## 2022-07-06 PROCEDURE — 6370000000 HC RX 637 (ALT 250 FOR IP): Performed by: SURGERY

## 2022-07-06 PROCEDURE — 2500000003 HC RX 250 WO HCPCS: Performed by: SURGERY

## 2022-07-06 PROCEDURE — 6370000000 HC RX 637 (ALT 250 FOR IP): Performed by: INTERNAL MEDICINE

## 2022-07-06 PROCEDURE — 74019 RADEX ABDOMEN 2 VIEWS: CPT

## 2022-07-06 RX ORDER — OXYCODONE HYDROCHLORIDE 5 MG/1
2.5 TABLET ORAL EVERY 6 HOURS PRN
Status: DISCONTINUED | OUTPATIENT
Start: 2022-07-06 | End: 2022-07-09 | Stop reason: HOSPADM

## 2022-07-06 RX ORDER — ACETAMINOPHEN 500 MG
1000 TABLET ORAL EVERY 8 HOURS SCHEDULED
Status: DISCONTINUED | OUTPATIENT
Start: 2022-07-06 | End: 2022-07-09 | Stop reason: HOSPADM

## 2022-07-06 RX ORDER — DEXTROSE, SODIUM CHLORIDE, AND POTASSIUM CHLORIDE 5; .45; .15 G/100ML; G/100ML; G/100ML
INJECTION INTRAVENOUS CONTINUOUS
Status: DISCONTINUED | OUTPATIENT
Start: 2022-07-06 | End: 2022-07-09 | Stop reason: HOSPADM

## 2022-07-06 RX ADMIN — AMIODARONE HYDROCHLORIDE 200 MG: 200 TABLET ORAL at 08:04

## 2022-07-06 RX ADMIN — SERTRALINE 25 MG: 25 TABLET, FILM COATED ORAL at 08:05

## 2022-07-06 RX ADMIN — ONDANSETRON 4 MG: 2 INJECTION INTRAMUSCULAR; INTRAVENOUS at 14:41

## 2022-07-06 RX ADMIN — Medication 10 ML: at 08:05

## 2022-07-06 RX ADMIN — PANTOPRAZOLE SODIUM 40 MG: 40 INJECTION, POWDER, LYOPHILIZED, FOR SOLUTION INTRAVENOUS at 08:04

## 2022-07-06 RX ADMIN — METOCLOPRAMIDE 10 MG: 5 INJECTION, SOLUTION INTRAMUSCULAR; INTRAVENOUS at 20:31

## 2022-07-06 RX ADMIN — POTASSIUM CHLORIDE, DEXTROSE MONOHYDRATE AND SODIUM CHLORIDE: 150; 5; 450 INJECTION, SOLUTION INTRAVENOUS at 16:55

## 2022-07-06 RX ADMIN — METOPROLOL TARTRATE 12.5 MG: 25 TABLET, FILM COATED ORAL at 20:32

## 2022-07-06 RX ADMIN — ONDANSETRON 4 MG: 2 INJECTION INTRAMUSCULAR; INTRAVENOUS at 08:04

## 2022-07-06 RX ADMIN — METOCLOPRAMIDE 10 MG: 5 INJECTION, SOLUTION INTRAMUSCULAR; INTRAVENOUS at 08:05

## 2022-07-06 RX ADMIN — METOPROLOL TARTRATE 12.5 MG: 25 TABLET, FILM COATED ORAL at 08:04

## 2022-07-06 RX ADMIN — ACETAMINOPHEN 1000 MG: 500 TABLET ORAL at 21:55

## 2022-07-06 RX ADMIN — METOCLOPRAMIDE 10 MG: 5 INJECTION, SOLUTION INTRAMUSCULAR; INTRAVENOUS at 14:41

## 2022-07-06 RX ADMIN — METOCLOPRAMIDE 10 MG: 5 INJECTION, SOLUTION INTRAMUSCULAR; INTRAVENOUS at 01:48

## 2022-07-06 RX ADMIN — AMITRIPTYLINE HYDROCHLORIDE 12.5 MG: 25 TABLET, FILM COATED ORAL at 20:32

## 2022-07-06 RX ADMIN — ACETAMINOPHEN 1000 MG: 500 TABLET ORAL at 09:28

## 2022-07-06 RX ADMIN — ENOXAPARIN SODIUM 40 MG: 100 INJECTION SUBCUTANEOUS at 20:31

## 2022-07-06 RX ADMIN — LEVOTHYROXINE SODIUM 112 MCG: 0.11 TABLET ORAL at 06:06

## 2022-07-06 RX ADMIN — PROMETHAZINE HYDROCHLORIDE: 25 INJECTION INTRAMUSCULAR; INTRAVENOUS at 16:57

## 2022-07-06 ASSESSMENT — PAIN SCALES - GENERAL
PAINLEVEL_OUTOF10: 0
PAINLEVEL_OUTOF10: 0
PAINLEVEL_OUTOF10: 3
PAINLEVEL_OUTOF10: 0

## 2022-07-06 ASSESSMENT — PAIN - FUNCTIONAL ASSESSMENT: PAIN_FUNCTIONAL_ASSESSMENT: ACTIVITIES ARE NOT PREVENTED

## 2022-07-06 ASSESSMENT — PAIN DESCRIPTION - ORIENTATION: ORIENTATION: MID

## 2022-07-06 ASSESSMENT — PAIN DESCRIPTION - LOCATION: LOCATION: ABDOMEN

## 2022-07-06 ASSESSMENT — PAIN DESCRIPTION - DESCRIPTORS: DESCRIPTORS: ACHING

## 2022-07-06 ASSESSMENT — PAIN DESCRIPTION - ONSET: ONSET: ON-GOING

## 2022-07-06 ASSESSMENT — PAIN DESCRIPTION - FREQUENCY: FREQUENCY: CONTINUOUS

## 2022-07-06 ASSESSMENT — PAIN DESCRIPTION - PAIN TYPE: TYPE: SURGICAL PAIN

## 2022-07-06 NOTE — PROGRESS NOTES
treatment goals. Please see assessment section for further patient specific details. If patient discharges prior to next session this note will serve as a discharge summary. Please see below for the latest assessment towards goals. Dispite high AM-PAC score it is still recommended that pt not return home. Pt lives along and demonstrates decreased safety awareness throughout therapy session leading to increased risk for falls. Pt would benefit from further therapy to promote increased strength, balance, safety and independence with functional mobility. DME Required For Discharge: rolling walker     Precautions/Restrictions: high fall risk, modified diet, . Comment: full liquid diet  Weight Bearing Restrictions: no restrictions  [] Right Upper Extremity  [] Left Upper Extremity [] Right Lower Extremity  [] Left Lower Extremity     Required Braces/Orthotics: no braces required   [] Right  [] Left  Positional Restrictions:no positional restrictions    Pre-Admission Information   Lives With: alone (pt has friends nearby that can assist with needs as needed)    Type of Home: house  Home Layout: one level  Home Access: 1 step to enter without rails   Bathroom Layout: tub/shower unit  Toilet Height: standard height  Bathroom Equipment: grab bars in shower, hand held shower head  Home Equipment: single point cane  Transfer Assistance: Independent without use of device  Ambulation Assistance:Independent without use of device  ADL Assistance: independent with all ADL's  IADL Assistance: independent with homemaking tasks  Active :        [x] Yes  [] No  Current Employment: retired. Occupation: several jobs  Hobbies: swimming, going out with friends to eat, spend time with 14 y/o grandson  Recent Falls: Pt reports no recent falls. Subjective  General: Pt supine in bed upon arrival. Pt agreeable to therapy session. Pt notes no pain this morning, but she had significant stomach pain last night. Pain: 0/10  Pain Interventions: not applicable       Functional Mobility  Bed Mobility  Bed mobility not completed on this date. Comments: In recliner at start and end of session   Transfers  Sit to stand transfer: stand by assistance  Stand to sit transfer: stand by assistance  Comments: SBA from recliner and from rollator   Ambulation  Surface:level surface  Assistive Device: rollator (7LSD)  Assistance: stand by assistance  Distance: 300' total  Gait Mechanics: slightly widened Yuniel, slightly decreased elizabeth  Comments:  1 brief seated rest break due to fatigues. VC to keep rollator close to her during ambulation, pt tends to push it out in front of herself. Stair Mobility  Stair mobility not completed on this date. Comments:  Wheelchair Mobility:  No w/c mobility completed on this date.   Comments:  Balance  Static Sitting Balance: good: independent with functional balance in unsupported position  Dynamic Sitting Balance: fair (+): maintains balance at SBA/supervision without use of UE support  Static Standing Balance: fair (-): maintains balance at SBA with use of UE support  Dynamic Standing Balance: fair (-): maintains balance at SBA with use of UE support  Comments:     Other Therapeutic Interventions  Assisted with toileting, dressing and baithing (see OT note for full details)    Functional Outcomes  -PAC Inpatient Mobility Raw Score : 18              Cognition  Overall Cognitive Status: Impaired  Following Commands: follows one step commands with repetition  Attention Span: attends with cues to redirect  Memory: decreased short term memory, decreased long term memory  Safety Judgement: decreased awareness of need for assistance, decreased awareness of need for safety  Problem Solving: decreased awareness of errors, assistance required to identify errors made, assistance required to correct errors made  Insights: decreased awareness of deficits  Initiation: requires cues for some  Sequencing: term goal 1: Pt will perform bed mobility MOD I   Short term goal 2: Pt will perform transfers with LRAD MOD I   Short term goal 3: Pt will ambulate 48' with LRAD and SBA-- goal met with ronda 7/5  Short term goal 4: Pt will negotiate 1 curb step with RW and CGA       Therapy Session Time      Individual Group Co-treatment   Time In     1039   Time Out     1123   Minutes     44     Timed Code Treatment Minutes:  44 Minutes  Total Treatment Minutes:  44       Electronically Signed By: Guy Salazar, PT   Guy Salazar, PT, DPT [id number]

## 2022-07-06 NOTE — PROGRESS NOTES
[facility]        Progress Note    Patient Name: Cora Christie  MRN: [id number]  YOB: 1945  Date of Evaluation: 2022    Subjective:  No acute events overnight  Feels gassy, has had liquidish BM, and a lot of flatus  Feels a little upset stomach with liquid diet      Vital Signs:  Patient Vitals for the past 24 hrs:   BP Temp Temp src Pulse Resp SpO2 Height Weight   22 0802 (!) 167/89 (!) 96.5 °F (35.8 °C) Temporal 76 18 94 % -- --   22 0425 (!) 146/65 97 °F (36.1 °C) Temporal 60 18 94 % -- 251 lb 4.8 oz (114 kg)   22 0026 -- -- -- -- 18 -- -- --   22 0000 (!) 147/76 -- -- 68 -- 96 % -- --   22 2357 (!) 164/113 97.1 °F (36.2 °C) Temporal 70 20 95 % -- --   22 1945 (!) 154/83 97.9 °F (36.6 °C) Temporal 78 18 95 % -- --   22 1532 (!) 142/81 96.8 °F (36 °C) Temporal 62 18 100 % -- --   22 1135 (!) 156/72 97.1 °F (36.2 °C) Temporal 70 18 93 % -- --   22 1003 (!) 156/74 -- -- -- -- -- -- --   22 0957 -- -- -- -- -- -- 5' 6\" (1.676 m) --   22 0840 (!) 156/ (!) 96.3 °F (35.7 °C) Temporal 70 18 96 % -- --      TEMPERATURE HISTORY 24H: Temp (24hrs), Av °F (36.1 °C), Min:96.3 °F (35.7 °C), Max:97.9 °F (36.6 °C)    BLOOD PRESSURE HISTORY: Systolic (52WXF), PPA:641 , Min:141 , GQC:814    Diastolic (25YEG), NEW:41, Min:65, Max:113      Intake/Output:  I/O last 3 completed shifts: In: 149.8 [IV Piggyback:149.8]  Out: 800 [Urine:800]  No intake/output data recorded.   Drain/tube Output:       Physical Exam:  General: awake, alert, oriented to person, place, time  Lungs: unlabored respirations  Abdomen: soft, mildly distended, incisional tenderness only, hypoactive bowel sounds present   Skin/Wound: Clean dressing in place, no cellulitis    Labs:  CBC:    Recent Labs     22  1148   WBC 8.9   HGB 13.2   HCT 41.0        BMP:    Recent Labs     22  1148      K 3.9    CO2 27   BUN 15   CREATININE 1.0   GLUCOSE 104*     Hepatic:    No results for input(s): AST, ALT, ALB, BILITOT, ALKPHOS in the last 72 hours. Amylase:    Lab Results   Component Value Date/Time    AMYLASE 38 03/20/2017 05:08 PM     Lipase:    Lab Results   Component Value Date/Time    LIPASE 107.0 06/27/2022 08:57 PM    LIPASE 58.0 03/20/2017 05:08 PM    LIPASE 16.0 03/12/2017 06:07 AM      Mag:    Lab Results   Component Value Date/Time    MG 2.00 07/05/2022 11:48 AM    MG 2.10 06/30/2022 08:21 AM     Phos:     Lab Results   Component Value Date/Time    PHOS 2.6 07/05/2022 11:48 AM      Coags:   Lab Results   Component Value Date/Time    PROTIME 11.8 02/18/2019 07:10 AM    INR 1.04 02/18/2019 07:10 AM    APTT 37.2 03/26/2018 08:29 PM       Cultures:  Anaerobic culture  No results found for: LABANAE  Fungus stain  No results found for requested labs within last 30 days. Gram stain  No results found for requested labs within last 30 days. Organism  Lab Results   Component Value Date/Time    ORG Klebsiella pneumoniae (A) 03/24/2022 04:07 PM     Surgical culture  No results found for: CXSURG  Blood culture 1  Results in Past 30 Days  Result Component Current Result Ref Range Previous Result Ref Range   Blood Culture, Routine No Growth after 4 days of incubation. (6/27/2022)  Not in Time Range      Blood culture 2  Results in Past 30 Days  Result Component Current Result Ref Range Previous Result Ref Range   Culture, Blood 2 No Growth after 4 days of incubation. (6/27/2022)  Not in Time Range      Fecal occult  No results found for requested labs within last 30 days. GI bacterial pathogens by PCR  No results found for requested labs within last 30 days. C. difficile  No results found for requested labs within last 30 days.      Urine culture  Lab Results   Component Value Date/Time    LABURIN >100,000 CFU/ml 03/24/2022 04:07 PM       Pathology:  OR 6/27/2022--FINAL DIAGNOSIS:     Colon and terminal ileum, right hemicolectomy:   -Congestion of colonic wall consistent with history of volvulus.   -Histologically unremarkable appendix.   -Negative for malignancy. Imaging:  I have personally reviewed the following films:    CT ABDOMEN PELVIS WO CONTRAST Additional Contrast? None    Result Date: 6/27/2022  EXAMINATION: CT OF THE ABDOMEN AND PELVIS WITHOUT CONTRAST 6/27/2022 8:43 pm TECHNIQUE: CT of the abdomen and pelvis was performed without the administration of intravenous contrast. Multiplanar reformatted images are provided for review. Automated exposure control, iterative reconstruction, and/or weight based adjustment of the mA/kV was utilized to reduce the radiation dose to as low as reasonably achievable. COMPARISON: None. HISTORY: ORDERING SYSTEM PROVIDED HISTORY: abd pain n/v TECHNOLOGIST PROVIDED HISTORY: Reason for exam:->abd pain n/v Additional Contrast?->None Decision Support Exception - unselect if not a suspected or confirmed emergency medical condition->Emergency Medical Condition (MA) Reason for Exam: abd pain n/v FINDINGS: Lower Chest: Scarring in the anterior right middle lobe and lingula. No acute findings. Small hiatal hernia. Organs: Evaluation of the abdominal and pelvic viscera is limited in the absence of contrast.  Subtle lobulated liver contour and widening of the fissural spaces suggestive of chronic liver disease. Cholecystectomy. The pancreas, spleen, adrenals and kidneys reveal no acute findings. There is an indeterminate 1.8 cm lesion arising from the posterosuperior pole the left kidney. GI/Bowel: Findings compatible cecal volvulus, with the dilated cecum located in the left upper quadrant measuring up to 10 cm in greatest dimension. Associated twisting of the mesentery and transition point on axial image 155. No small bowel distension. Mesenteric edema is noted. No pneumatosis or gross perforation identified. Distal colonic diverticulosis. Pelvis: No acute findings. Peritoneum/Retroperitoneum: Trace abdominopelvic ascites. No free air. No lymphadenopathy. Bones/Soft Tissues: No acute osseous abnormality identified. Multilevel degenerative disc disease and facet arthropathy with degenerative grade 1 anterolisthesis of L4.     1.  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 Associated mesenteric edema and trace abdominopelvic ascites. No pneumatosis or gross perforation. Findings were discussed with Ricardo Castro at 9:58 pm on 6/27/2022. 2.  Incidental 1.8 cm indeterminate left renal lesion for which nonemergent follow-up with contrast-enhanced CT or MRI renal mass protocol is recommended. 3.  Morphologic findings suggestive of chronic liver disease. 4.  Additional chronic and benign findings, as above. Scheduled Meds:   acetaminophen  1,000 mg Oral 3 times per day    bisacodyl  10 mg Rectal Once    levothyroxine  112 mcg Oral Daily    amitriptyline  12.5 mg Oral Nightly    sertraline  25 mg Oral Daily    metoclopramide  10 mg IntraVENous Q6H    amiodarone  200 mg Oral Daily    metoprolol tartrate  12.5 mg Oral BID    pantoprazole  40 mg IntraVENous Daily    sodium chloride flush  5-40 mL IntraVENous 2 times per day    enoxaparin  40 mg SubCUTAneous Nightly     Continuous Infusions:   sodium chloride       PRN Meds:.oxyCODONE, bisacodyl, diazePAM, magnesium sulfate, sodium phosphate IVPB **OR** sodium phosphate IVPB **OR** sodium phosphate IVPB, sodium chloride flush, sodium chloride, ondansetron **OR** ondansetron, polyethylene glycol      Assessment:  65 y.o. female admitted with   1. Cecal volvulus ([facility] [address]    2. Septicemia ([facility] [address]    3. Volvulus ([facility] [address]        OR Date 6/27/2022, exploratory laparotomy, detorsion of volvulus, right hemicolectomy with primary ileocolonic anastomosis for cecal volvulus  Hypertension  Acute kidney injury      Plan:  1.  Try to adv diet to regular po today, some bowel activity, decrease narcotics, cont promotility agents, laxatives - stomach still a little upset  2. Lytes OK yesterday  3. DC IV hydration  4. DC Antibiotics  5. Activity as tolerated, ambulate TID--PT/OT evaluation and treatment  6. Pulmonary toilet, incentive spirometry  7. PRN analgesics and antiemetics--minimizing narcotics as tolerated, continue scheduled Tylenol  8. DVT prophylaxis with Lovenox and SCD's  9. Management of medical comorbid etiologies per consulting services    EDUCATION:  Educated patient on plan of care and disease process--all questions answered.     Plans discussed with patient    Signed:      Hank Stallings MD

## 2022-07-06 NOTE — PROGRESS NOTES
Extremity     Required Braces/Orthotics: no braces required   [] Right  [] Left  Positional Restrictions:no positional restrictions      Pre-Admission Information   Lives With: alone (pt has friends nearby that can assist with needs as needed)                 Type of Home: house  Home Layout: one level  Home Access: 1 step to enter without rails   Bathroom Layout: tub/shower unit  Toilet Height: standard height  Bathroom Equipment: grab bars in shower, hand held shower head  Home Equipment: single point cane  Transfer Assistance: Independent without use of device  Ambulation Assistance:Independent without use of device  ADL Assistance: independent with all ADL's  IADL Assistance: independent with homemaking tasks  Active :        [x]? Yes            []? No  Current Employment: retired. Occupation: several jobs  Hobbies: swimming, going out with friends to eat, spend time with 14 y/o grandson  Recent Falls: Pt reports no recent falls. Subjective  General:  Pt supine in bed with HOB elevated. Pt agreeable to OT/PT cotreat despite c/o not sleeping well and nausea. Pt noted to inappropriately positione her glasses on her face. Pain: 0/10  Pain Interventions: pain medication in place prior to arrival and repositioned            Activities of Daily Living    Basic Activities of Daily Living  Grooming: contact guard assistance  Grooming Comments: washing hands in stance at the sink  Upper Extremity Bathing: contact guard assistance requires verbal cueing Increased time to complete task. Equipment: none  Lower Extremity Bathing: minimal assistance requires verbal cueing Increased time to complete task . Comment: pt did not thoroughly complete LB bathing d/t decreased endurance. Equipment: none  Bathing Comments: sponge bath completed while in recliner, pt positioned her legs in an upright position on recliner, pt refused to bath buttocks and sandra-area.    Upper Extremity Dressing: setup assistance minimal assistance Increased time to complete task . Comment: gardenia greerge. Equipment: none  Dressing Comments: changed gown while seated in recliner with feet propped in an upright position  Toileting: contact guard assistance requires verbal cueing. Equipment: none  Toileting Comments: pt completed on commode, verbal cues for safety    Comment: pt with deficits in sequencing, initiation, and safety awareness- required mod verbal cues for safety to reduce the risk of falls    Instrumental Activities of Daily Living  No IADL completed on this date. Functional Mobility    Bed Mobility  Supine to Sit: stand by assistance  Comments: HOB elevated    Transfers  Sit to stand transfer:contact guard assistance, . Comment max verbal cues for hand placement, poor carryover of learned information   Stand to sit transfer: contact guard assistance, . Comment max verbal cues for hand placement, poor carryover of learned information   Toilet transfer: contact guard assistance. Mobility technique: ambulating. Equipment utilized: grab bars  Comments:max verbal cues for hand placement, poor carryover of learned information    Functional Mobility:  Sitting Balance: stand by assistance. Standing Balance: contact guard assistance. Functional Mobility: rollator (8IWF). contact guard assistance.   Activity: to/from bathroom, on hospital unit, pt required one seated rest break    Other Therapeutic Interventions    Functional Outcomes  -PAC Inpatient Daily Activity Raw Score: 17       Cognition  Overall Cognitive Status: Impaired  Following Commands: follows one step commands with repetition  Attention Span: attends with cues to redirect  Memory: decreased short term memory, decreased long term memory  Safety Judgement: decreased awareness of need for assistance, decreased awareness of need for safety  Problem Solving: decreased awareness of errors, assistance required to identify errors made, assistance required to correct errors made  Insights: decreased awareness of deficits  Initiation: requires cues for some  Sequencing: requires cues for all  Orientation:    alert and oriented x 4  Command Following:   accurately follows one step commands     Education    Barriers To Learning: cognition  Patient Education: patient educated on OT role and benefits, plan of care, ADL adaptive strategies, energy conservation, transfer training, discharge recommendations  Learning Assessment:  patient verbalizes and demonstrates understanding    Assessment    Activity Tolerance: Pt tolerated OT/PT co-treat fairly. Pt is limited by fatigue and decreased endurance. Pt required intermittent rest breaks. Impairments Requiring Therapeutic Intervention: decreased functional mobility, decreased ADL status, decreased strength, decreased endurance, decreased IADL  Prognosis: good  Clinical Assessment: PTA, pt lives alone in a one story home and was independent with ADLs/IADLs, pt is an active . Patient presents with decreased independence with ADL and IADL due to late affects of cecal volvulus status post lap choley and hemicolectomy, she will benefit from skilled OT to improve her level of independence. Safety Interventions: patient left in chair, chair alarm in place and call light within reach       Plan  Frequency: 3-5 x/per week   Current Treatment Recommendations: balance training, functional mobility training, transfer training, endurance training, ADL/self-care training and IADL training    Goals  Patient Goals Patient's goal is to return home with assist of home care and family   Short Term Goals:  Time Frame:  Discharge   Patient will complete lower body ADL at modified independent   Patient will complete toileting at modified independent   Patient will complete functional mobility at modified independent   Patient to maintain standing at modified independent for 7-10 minutes.    -Goal not yet met 7/6/22     Therapy Session Time     Individual Group Co-treatment   Time In    0061   Time Out    1123   Minutes    44        Timed Code Treatment Minutes:  Timed Code Treatment Minutes: 44 Minutes    Total Treatment Minutes:  40    Electronically Signed By: VISH Dash OTR/SUMMER, [id number]

## 2022-07-06 NOTE — PROGRESS NOTES
Attempted to place NGT. Pt would not allow this RN to try anymore. Call placed to Dr. Mariah Mcrae. Awaiting a return call.

## 2022-07-06 NOTE — PROGRESS NOTES
[facility] PROGRESS NOTE    7/6/2022 1:42 PM        Name: Radha Anaya . Admitted: 6/27/2022  Primary Care Provider: CARLIE Hdz CNP (Tel: [phone])                        Subjective:  . No acute events overnight. Resting well. Pain control. Diet ok. Labs reviewed  Denies any chest pain sob. Having loose stool with ostomy output.     Reviewed interval ancillary notes    Current Medications  acetaminophen (TYLENOL) tablet 1,000 mg, 3 times per day  oxyCODONE (ROXICODONE) immediate release tablet 2.5 mg, Q6H PRN  bisacodyl (DULCOLAX) suppository 10 mg, Once  bisacodyl (DULCOLAX) suppository 10 mg, Daily PRN  levothyroxine (SYNTHROID) tablet 112 mcg, Daily  amitriptyline (ELAVIL) tablet 12.5 mg, Nightly  sertraline (ZOLOFT) tablet 25 mg, Daily  metoclopramide (REGLAN) injection 10 mg, Q6H  amiodarone (CORDARONE) tablet 200 mg, Daily  metoprolol tartrate (LOPRESSOR) tablet 12.5 mg, BID  diazePAM (VALIUM) injection 2.5 mg, Q8H PRN  magnesium sulfate 2000 mg in 50 mL IVPB premix, PRN  sodium phosphate 10 mmol in sodium chloride 0.9 % 250 mL IVPB, PRN   Or  sodium phosphate 15 mmol in sodium chloride 0.9 % 250 mL IVPB, PRN   Or  sodium phosphate 20 mmol in sodium chloride 0.9 % 500 mL IVPB, PRN  pantoprazole (PROTONIX) injection 40 mg, Daily  sodium chloride flush 0.9 % injection 5-40 mL, 2 times per day  sodium chloride flush 0.9 % injection 5-40 mL, PRN  0.9 % sodium chloride infusion, PRN  ondansetron (ZOFRAN-ODT) disintegrating tablet 4 mg, Q8H PRN   Or  ondansetron (ZOFRAN) injection 4 mg, Q6H PRN  polyethylene glycol (GLYCOLAX) packet 17 g, Daily PRN  enoxaparin (LOVENOX) injection 40 mg, Nightly        Objective:  /69   Pulse 66   Temp (!) 96.3 °F (35.7 °C) (Temporal)   Resp 18   Ht 5' 6\" (1.676 m)   Wt 251 lb 4.8 oz (114 kg)   SpO2 98% BMI 40.56 kg/m²     Intake/Output Summary (Last 24 hours) at 7/6/2022 1342  Last data filed at 7/6/2022 0802  Gross per 24 hour   Intake --   Output 625 ml   Net -625 ml      Wt Readings from Last 3 Encounters:   07/06/22 251 lb 4.8 oz (114 kg)   03/24/22 254 lb 12.8 oz (115.6 kg)   09/28/21 250 lb (113.4 kg)       General appearance:  Appears comfortable  Eyes: Sclera clear. Pupils equal.  ENT: Moist oral mucosa. Trachea midline, no adenopathy. Cardiovascular: Regular rhythm, normal S1, S2. No murmur. No edema in lower extremities  Respiratory: Not using accessory muscles. Good inspiratory effort. Clear to auscultation bilaterally, no wheeze or crackles. GI: Abdomen soft, no tenderness, not distended, normal bowel sounds  Musculoskeletal: No cyanosis in digits, neck supple  Neurology: CN 2-12 grossly intact. No speech or motor deficits  Psych: Normal affect. Alert and oriented in time, place and person  Skin: Warm, dry, normal turgor    Labs and Tests:  CBC:   Recent Labs     07/05/22  1148   WBC 8.9   HGB 13.2        BMP:    Recent Labs     07/05/22  1148      K 3.9      CO2 27   BUN 15   CREATININE 1.0   GLUCOSE 104*     Hepatic: No results for input(s): AST, ALT, ALB, BILITOT, ALKPHOS in the last 72 hours. Discussed care with patient             Problem List  Principal Problem:    Cecal volvulus ([address]  Active Problems:    LV dysfunction    AMPARO (acute kidney injury) ([address]    Acquired hypothyroidism    GERD (gastroesophageal reflux disease)    Atrial fibrillation (HCC)    Chronic combined systolic (congestive) and diastolic (congestive) heart failure ([address]  Resolved Problems:    * No resolved hospital problems. *       Assessment & Plan:   1. Cecal volvulus  -Patient is status post exploratory laparotomy, detorsion of volvulus, right hemicolectomy with primary ileocolonic anastomosis for cecal volvulus  -Seems to be improving slowly.   Continue management per general surgery  -DC

## 2022-07-06 NOTE — PLAN OF CARE
Problem: Pain  Goal: Verbalizes/displays adequate comfort level or baseline comfort level  Outcome: Progressing     Problem: Safety - Adult  Goal: Free from fall injury  Outcome: Progressing     Problem: Chronic Conditions and Co-morbidities  Goal: Patient's chronic conditions and co-morbidity symptoms are monitored and maintained or improved  Outcome: Progressing

## 2022-07-06 NOTE — PROGRESS NOTES
Spoke with Dr. Marcelle Estes regarding pt's refusal of NGT placement. Dr. Marcelle Estes stated he had just finished a case and he would come speak with pt and attempt to place NGT. Information passed on to night shift RN.

## 2022-07-06 NOTE — PROGRESS NOTES
Pt with copious amounts of yellow emesis. 700 mL in bag and all over pt's room. Dr. Pacheco Listen notified and gave orders for IVF's, NGT placement, AM labs, and AM abd x-ray. All orders placed.

## 2022-07-06 NOTE — CARE COORDINATION
CM received call back from [place] at [facility] [place] [facility] and they do have a bed available and will start pre cert. Pt verbalized understanding  that if denied by insurance per therapy scores ( 18/24 PT and 17/24 for OT) will go home with home care.     Vicky Castro, RN, BSN  [phone]

## 2022-07-06 NOTE — CARE COORDINATION
CM spoke to pt about therapy recommendations for snf and insurance approved and medicare ratings list provided. Will follow up for choices.       Clair Patel RN, BSN  [phone]

## 2022-07-07 ENCOUNTER — APPOINTMENT (OUTPATIENT)
Dept: GENERAL RADIOLOGY | Age: 77
DRG: 853 | End: 2022-07-07
Payer: MEDICARE

## 2022-07-07 LAB
ANION GAP SERPL CALCULATED.3IONS-SCNC: 5 MMOL/L (ref 3–16)
BASOPHILS ABSOLUTE: 0 K/UL (ref 0–0.2)
BASOPHILS RELATIVE PERCENT: 0.5 %
BUN BLDV-MCNC: 10 MG/DL (ref 7–20)
CALCIUM SERPL-MCNC: 9.3 MG/DL (ref 8.3–10.6)
CHLORIDE BLD-SCNC: 103 MMOL/L (ref 99–110)
CO2: 29 MMOL/L (ref 21–32)
CREAT SERPL-MCNC: 0.9 MG/DL (ref 0.6–1.2)
EOSINOPHILS ABSOLUTE: 0.4 K/UL (ref 0–0.6)
EOSINOPHILS RELATIVE PERCENT: 5.8 %
GFR AFRICAN AMERICAN: >60
GFR NON-AFRICAN AMERICAN: >60
GLUCOSE BLD-MCNC: 103 MG/DL (ref 70–99)
HCT VFR BLD CALC: 39.9 % (ref 36–48)
HEMOGLOBIN: 12.9 G/DL (ref 12–16)
LYMPHOCYTES ABSOLUTE: 1.1 K/UL (ref 1–5.1)
LYMPHOCYTES RELATIVE PERCENT: 15.7 %
MCH RBC QN AUTO: 28.4 PG (ref 26–34)
MCHC RBC AUTO-ENTMCNC: 32.3 G/DL (ref 31–36)
MCV RBC AUTO: 87.9 FL (ref 80–100)
MONOCYTES ABSOLUTE: 0.8 K/UL (ref 0–1.3)
MONOCYTES RELATIVE PERCENT: 11.2 %
NEUTROPHILS ABSOLUTE: 4.5 K/UL (ref 1.7–7.7)
NEUTROPHILS RELATIVE PERCENT: 66.8 %
PDW BLD-RTO: 13.8 % (ref 12.4–15.4)
PLATELET # BLD: 295 K/UL (ref 135–450)
PMV BLD AUTO: 7.4 FL (ref 5–10.5)
POTASSIUM SERPL-SCNC: 4 MMOL/L (ref 3.5–5.1)
RBC # BLD: 4.54 M/UL (ref 4–5.2)
SODIUM BLD-SCNC: 137 MMOL/L (ref 136–145)
WBC # BLD: 6.7 K/UL (ref 4–11)

## 2022-07-07 PROCEDURE — 97535 SELF CARE MNGMENT TRAINING: CPT

## 2022-07-07 PROCEDURE — 6370000000 HC RX 637 (ALT 250 FOR IP): Performed by: NURSE PRACTITIONER

## 2022-07-07 PROCEDURE — 97530 THERAPEUTIC ACTIVITIES: CPT

## 2022-07-07 PROCEDURE — 74019 RADEX ABDOMEN 2 VIEWS: CPT

## 2022-07-07 PROCEDURE — 6360000002 HC RX W HCPCS: Performed by: SURGERY

## 2022-07-07 PROCEDURE — 6370000000 HC RX 637 (ALT 250 FOR IP): Performed by: SURGERY

## 2022-07-07 PROCEDURE — 6370000000 HC RX 637 (ALT 250 FOR IP): Performed by: HOSPITALIST

## 2022-07-07 PROCEDURE — 99024 POSTOP FOLLOW-UP VISIT: CPT | Performed by: SURGERY

## 2022-07-07 PROCEDURE — 85025 COMPLETE CBC W/AUTO DIFF WBC: CPT

## 2022-07-07 PROCEDURE — 97116 GAIT TRAINING THERAPY: CPT

## 2022-07-07 PROCEDURE — C9113 INJ PANTOPRAZOLE SODIUM, VIA: HCPCS | Performed by: INTERNAL MEDICINE

## 2022-07-07 PROCEDURE — 2580000003 HC RX 258: Performed by: INTERNAL MEDICINE

## 2022-07-07 PROCEDURE — 6360000002 HC RX W HCPCS: Performed by: INTERNAL MEDICINE

## 2022-07-07 PROCEDURE — 2140000000 HC CCU INTERMEDIATE R&B

## 2022-07-07 PROCEDURE — 80048 BASIC METABOLIC PNL TOTAL CA: CPT

## 2022-07-07 PROCEDURE — 2500000003 HC RX 250 WO HCPCS: Performed by: SURGERY

## 2022-07-07 RX ORDER — BISACODYL 10 MG
10 SUPPOSITORY, RECTAL RECTAL ONCE
Status: DISCONTINUED | OUTPATIENT
Start: 2022-07-07 | End: 2022-07-09 | Stop reason: HOSPADM

## 2022-07-07 RX ADMIN — LEVOTHYROXINE SODIUM 112 MCG: 0.11 TABLET ORAL at 06:26

## 2022-07-07 RX ADMIN — ACETAMINOPHEN 1000 MG: 500 TABLET ORAL at 20:28

## 2022-07-07 RX ADMIN — POTASSIUM CHLORIDE, DEXTROSE MONOHYDRATE AND SODIUM CHLORIDE: 150; 5; 450 INJECTION, SOLUTION INTRAVENOUS at 14:02

## 2022-07-07 RX ADMIN — AMITRIPTYLINE HYDROCHLORIDE 12.5 MG: 25 TABLET, FILM COATED ORAL at 20:28

## 2022-07-07 RX ADMIN — ACETAMINOPHEN 1000 MG: 500 TABLET ORAL at 06:26

## 2022-07-07 RX ADMIN — ENOXAPARIN SODIUM 40 MG: 100 INJECTION SUBCUTANEOUS at 20:40

## 2022-07-07 RX ADMIN — METOCLOPRAMIDE 10 MG: 5 INJECTION, SOLUTION INTRAMUSCULAR; INTRAVENOUS at 02:47

## 2022-07-07 RX ADMIN — METOPROLOL TARTRATE 12.5 MG: 25 TABLET, FILM COATED ORAL at 08:21

## 2022-07-07 RX ADMIN — SERTRALINE 25 MG: 25 TABLET, FILM COATED ORAL at 08:22

## 2022-07-07 RX ADMIN — METOCLOPRAMIDE 10 MG: 5 INJECTION, SOLUTION INTRAMUSCULAR; INTRAVENOUS at 08:22

## 2022-07-07 RX ADMIN — METOCLOPRAMIDE 10 MG: 5 INJECTION, SOLUTION INTRAMUSCULAR; INTRAVENOUS at 20:27

## 2022-07-07 RX ADMIN — Medication 10 ML: at 08:22

## 2022-07-07 RX ADMIN — METOPROLOL TARTRATE 12.5 MG: 25 TABLET, FILM COATED ORAL at 20:28

## 2022-07-07 RX ADMIN — METOCLOPRAMIDE 10 MG: 5 INJECTION, SOLUTION INTRAMUSCULAR; INTRAVENOUS at 13:59

## 2022-07-07 RX ADMIN — PANTOPRAZOLE SODIUM 40 MG: 40 INJECTION, POWDER, LYOPHILIZED, FOR SOLUTION INTRAVENOUS at 08:22

## 2022-07-07 ASSESSMENT — PAIN SCALES - GENERAL
PAINLEVEL_OUTOF10: 0

## 2022-07-07 NOTE — PROGRESS NOTES
[address] [facility],6[address] Msb Department   Phone: [phone]    Occupational Therapy    [] Initial Evaluation            [x] Daily Treatment Note         [] Discharge Summary      Patient: Malika Franks   : 1945   MRN: [id number]   Date of Service:  2022    Admitting Diagnosis:  Cecal volvulus ([facility] [address]  Current Admission Summary: exploratory laprotomy, detosion of volvulus, right hemicoloctomy   Past Medical History:  has a past medical history of AMPARO (acute kidney injury) ([address] Arthritis, Asthma, Atrial fibrillation ([address] Depression, Diverticulitis, DVT, GERD (gastroesophageal reflux disease), Hx of blood clots, Hypertension, Insomnia, Other disorders of kidney and ureter, Pneumonia, Shingles, and Thyroid disease. Past Surgical History:  has a past surgical history that includes Cholecystectomy, laparoscopic (03/10/2017); Cardiac surgery (); Cardioversion (2018); and hemicolectomy (N/A, 2022). Discharge Recommendations: Malika Franks scored a 17/24 on the AM-PAC ADL Inpatient form. Current research shows that an AM-PAC score of 17 or less is typically not associated with a discharge to the patient's home setting. Based on the patient's AM-PAC score and their current ADL deficits, it is recommended that the patient have 3-5 sessions per week of Occupational Therapy at d/c to increase the patient's independence. Please see assessment section for further patient specific details. If patient discharges prior to next session this note will serve as a discharge summary. Please see below for the latest assessment towards goals. DME Required For Discharge: bedside commode, shower chair with back, hand-held shower head, long-handle sponge    Precautions/Restrictions: high fall risk, up as tolerated, .   Comment: NPO   Weight Bearing Restrictions: no restrictions    Required Braces/Orthotics: no braces required  Positional Restrictions:no completed on this date. Functional Mobility    Bed Mobility  Supine to Sit: stand by assistance  Comments: HOB elevated    Transfers  Sit to stand transfer:contact guard assistance, . Comment max verbal cues for hand placement, poor carryover of learned information   Stand to sit transfer: contact guard assistance, . Comment max verbal cues for hand placement, poor carryover of learned information   Comments:max verbal cues for hand placement, poor carryover of learned information    Functional Mobility:  Sitting Balance: stand by assistance. Standing Balance: contact guard assistance. Functional Mobility: rollator (7XYI). contact guard assistance. Activity: to/from bathroom, on hospital unit x2, seated rest break    Other Therapeutic Interventions    Functional Outcomes  AM-PAC Inpatient Daily Activity Raw Score: 17       Cognition  Overall Cognitive Status: Impaired  Following Commands: follows one step commands with repetition  Safety Judgement: decreased awareness of need for assistance, decreased awareness of need for safety  Insights: decreased awareness of deficits  Initiation: requires cues for some  Sequencing: requires cues for some  Orientation:    alert and oriented x 4  Command Following:   accurately follows one step commands     Education    Barriers To Learning: cognition  Patient Education: patient educated on OT role and benefits, plan of care, ADL adaptive strategies, energy conservation, transfer training, discharge recommendations  Learning Assessment:  patient verbalizes and demonstrates understanding    Assessment    Activity Tolerance: Pt tolerated OT/PT co-treat well. Pt is limited by fatigue and decreased endurance. Pt required one rest break.     Impairments Requiring Therapeutic Intervention: decreased functional mobility, decreased ADL status, decreased strength, decreased endurance, decreased IADL  Prognosis: good  Clinical Assessment: PTA, pt lives alone in a one story home and was independent with ADLs/IADLs, pt is an active . Patient presents with decreased independence with ADL and IADL due to late affects of cecal volvulus status post lap choley and hemicolectomy, she will benefit from skilled OT to improve her level of independence. Safety Interventions: patient left in chair, chair alarm in place and call light within reach       Plan  Frequency: 3-5 x/per week   Current Treatment Recommendations: balance training, functional mobility training, transfer training, endurance training, ADL/self-care training and IADL training    Goals  Patient Goals Patient's goal is to return home with assist of home care and family   Short Term Goals:  Time Frame:  Discharge   Patient will complete lower body ADL at modified independent   Patient will complete toileting at modified independent   Patient will complete functional mobility at modified independent   Patient to maintain standing at modified independent for 7-10 minutes.    -Goal not yet met 7/7/22     Therapy Session Time     Individual Group Co-treatment   Time In    7011   Time Out    1353   Minutes    40        Timed Code Treatment Minutes:  Timed Code Treatment Minutes: 40 Minutes    Total Treatment Minutes:  40    Electronically Signed By: VISH Price OTR/L, [id number]

## 2022-07-07 NOTE — PLAN OF CARE
Problem: Chronic Conditions and Co-morbidities  Goal: Patient's chronic conditions and co-morbidity symptoms are monitored and maintained or improved  Outcome: Progressing     Problem: Discharge Planning  Goal: Discharge to home or other facility with appropriate resources  Outcome: Progressing     Problem: Pain  Goal: Verbalizes/displays adequate comfort level or baseline comfort level  Outcome: Progressing     Problem: Safety - Adult  Goal: Free from fall injury  Outcome: Progressing  Flowsheets (Taken 7/6/2022 2106)  Free From Fall Injury:   Instruct family/caregiver on patient safety   Based on caregiver fall risk screen, instruct family/caregiver to ask for assistance with transferring infant if caregiver noted to have fall risk factors     Problem: ABCDS Injury Assessment  Goal: Absence of physical injury  Outcome: Progressing  Flowsheets (Taken 7/6/2022 2106)  Absence of Physical Injury: Implement safety measures based on patient assessment     Problem: Nutrition Deficit:  Goal: Optimize nutritional status  Outcome: Progressing     Problem: Skin/Tissue Integrity  Goal: Absence of new skin breakdown  Description: 1. Monitor for areas of redness and/or skin breakdown  2. Assess vascular access sites hourly  3. Every 4-6 hours minimum:  Change oxygen saturation probe site  4. Every 4-6 hours:  If on nasal continuous positive airway pressure, respiratory therapy assess nares and determine need for appliance change or resting period.   Outcome: Progressing

## 2022-07-07 NOTE — PROGRESS NOTES
[facility] Department   Phone: [phone]    Physical Therapy    [] Initial Evaluation            [x] Daily Treatment Note         [] Discharge Summary      Patient: Klaudia Green   : 1945   MRN: [id number]   Date of Service:  2022  Admitting Diagnosis: Cecal volvulus [facility])  Current Admission Summary: 2022, exploratory laparotomy, detorsion of volvulus, right hemicolectomy with primary ileocolonic anastomosis for cecal volvulus  Past Medical History:  has a past medical history of AMPARO (acute kidney injury) ([facility] [address] Arthritis, Asthma, Atrial fibrillation ([facility] [address] Depression, Diverticulitis, DVT, GERD (gastroesophageal reflux disease), Hx of blood clots, Hypertension, Insomnia, Other disorders of kidney and ureter, Pneumonia, Shingles, and Thyroid disease. Past Surgical History:  has a past surgical history that includes Cholecystectomy, laparoscopic (03/10/2017); Cardiac surgery (); Cardioversion (2018); and hemicolectomy (N/A, 2022). Discharge Recommendations: Klaudia Green scored a 18/24 on the AM-PAC short mobility form. Current research shows that an AM-PAC score of 17 or less is typically not associated with a discharge to the patient's home setting. Based on the patient's AM-PAC score and their current functional mobility deficits, it is recommended that the patient have 5-7 sessions per week of Physical Therapy at d/c to increase the patient's independence. At this time, this patient demonstrates complex nursing, medical, and rehabilitative needs, and would benefit from intensive rehabilitation services upon discharge from the Inpatient setting.   This patient demonstrates the ability to participate in and benefit from an intensive therapy program with a coordinated interdisciplinary team approach to foster frequent, structured, and documented communication among disciplines, who will work together to establish, prioritize, and achieve treatment goals. Please see assessment section for further patient specific details. If patient discharges prior to next session this note will serve as a discharge summary. Please see below for the latest assessment towards goals. Despite high AM-PAC score it is still recommended that pt not return home. Pt lives along and demonstrates decreased safety awareness throughout therapy session leading to increased risk for falls. Pt would benefit from further therapy to promote increased strength, balance, safety and independence with functional mobility. DME Required For Discharge: rolling walker     Precautions/Restrictions: high fall risk, modified diet, . Comment: NPO  Weight Bearing Restrictions: no restrictions  [] Right Upper Extremity  [] Left Upper Extremity [] Right Lower Extremity  [] Left Lower Extremity     Required Braces/Orthotics: no braces required   [] Right  [] Left  Positional Restrictions:no positional restrictions    Pre-Admission Information   Lives With: alone (pt has friends nearby that can assist with needs as needed)    Type of Home: house  Home Layout: one level  Home Access:  1 step to enter without rails   Bathroom Layout: tub/shower unit  Toilet Height: standard height  Bathroom Equipment: grab bars in shower, hand held shower head  Home Equipment: single point cane  Transfer Assistance: Independent without use of device  Ambulation Assistance:Independent without use of device  ADL Assistance: independent with all ADL's  IADL Assistance: independent with homemaking tasks  Active :        [x] Yes  [] No  Current Employment: retired. Occupation: several jobs  Hobbies: swimming, going out with friends to eat, spend time with 14 y/o grandson  Recent Falls: Pt reports no recent falls. Subjective  General: Pt sitting in recliner upon therapist arrival. Pt's sister present. Pt agreeable to PT/OT treatment.   Pain: 0/10  Pain Interventions: not applicable Functional Mobility  Bed Mobility  Sit to Supine: stand by assistance  Comments: With increased effort and time, HOB elevated  Transfers  Sit to stand transfer: contact guard assistance  Stand to sit transfer: contact guard assistance  Comments: Pt cued for hand placement during transfers (I.e pushing up from chair with hands when standing, reaching back for chair when sitting)  Ambulation  Surface:level surface  Assistive Device: rollator (7MHN)  Assistance: stand by assistance  Distance: 300' + 300'  Gait Mechanics: slightly widened Yuniel, slightly decreased elizabeth  Comments:  Seated rest break between to complete ADLs, pt cued to keep rollator close. Pt able to maintain balance and elizabeth with horizontal and vertical head turns approx 20'. Stair Mobility  Stair mobility not completed on this date. Comments:  Wheelchair Mobility:  No w/c mobility completed on this date.   Comments:  Balance  Static Sitting Balance: good: independent with functional balance in unsupported position  Dynamic Sitting Balance: fair (+): maintains balance at SBA/supervision without use of UE support  Static Standing Balance: fair (-): maintains balance at SBA with use of UE support  Dynamic Standing Balance: fair (-): maintains balance at SBA with use of UE support  Comments:     Other Therapeutic Interventions  ADLs - see OT note  Functional Outcomes  AM-PAC Inpatient Mobility Raw Score : 18              Cognition  Overall Cognitive Status: Impaired  Following Commands: follows one step commands with repetition  Safety Judgement: decreased awareness of need for assistance, decreased awareness of need for safety  Problem Solving: decreased awareness of errors, assistance required to identify errors made, assistance required to correct errors made  Insights: decreased awareness of deficits  Initiation: requires cues for some  Sequencing: requires cues for some  Orientation:    alert and oriented x 4  Command Following:   accurately follows one step commands    Education  Barriers To Learning: cognition  Patient Education: patient educated on goals, PT role and benefits, plan of care, general safety, functional mobility training, transfer training, discharge recommendations  Learning Assessment:  patient verbalizes understanding, would benefit from continued reinforcement    Assessment  Activity Tolerance: Pt demonstrates improved activity tolerance today as she was able to ambulate 600' total with one seated break after 300'. (Compared to prior session where she ambulated 300' and required a rest break)  Impairments Requiring Therapeutic Intervention: decreased functional mobility, decreased strength, decreased endurance, decreased balance  Prognosis: good  Clinical Assessment: Pt shows improved activity tolerance compared to previous session, however continues to demonstrate decreased safety awareness throughout therapy session (I.e. beginning to get up while therapist is still providing instruction, poor awareness of IV, and showing no carry over with cueing for hand placement during transfers). Cueing required for rollator management and general safety. Pt educated in regards to discharge plans; explained rehab stay after discharge may help prevent additional hospital stays and improve pt to baseline faster as well as maintain pt safety. Pt will continue to benefit from skilled physical therapy to address impairments.     Safety Interventions: patient left in bed, bed alarm in place, call light within reach, gait belt, patient at risk for falls and nurse notified    Plan  Frequency: 3-5 x/per week  Current Treatment Recommendations: strengthening, balance training, functional mobility training, transfer training, gait training, stair training, endurance training, neuromuscular re-education, modalities, home exercise program, safety education, equipment evaluation/education and positioning    Goals  Patient Goals: pt did not state   Short Term Goals:  Time Frame: upon d/c  Short term goal 1: Pt will perform bed mobility MOD I   Short term goal 2: Pt will perform transfers with LRAD MOD I   Short term goal 3: Pt will ambulate 48' with LRAD and SBA-- goal met with rollator 7/5, 7/7  **New goal: Pt will ambulate 300' with LRAD at MOD I  Short term goal 4: Pt will negotiate 1 curb step with RW and CGA       Therapy Session Time      Individual Group Co-treatment   Time In     1313   Time Out     1353   Minutes     40     Timed Code Treatment Minutes:  40 Minutes  Total Treatment Minutes:  40 Minutes     Catherine Rader, SPT  PT providing direct supervision during session and assisting in making skilled judgements throughout session.     Electronically Signed By: Margo Reyes, PT   Margo Reyes, PT, DPT, [id number]

## 2022-07-07 NOTE — PROGRESS NOTES
[facility] PROGRESS NOTE    7/7/2022 2:16 PM        Name: Niki Delacruz . Admitted: 6/27/2022  Primary Care Provider: CARLIE Hernandez CNP (Tel: [phone])                        Subjective:  . Still having some abdominal pain with small distention. No further nausea.   Having some flatus    Reviewed interval ancillary notes    Current Medications  bisacodyl (DULCOLAX) suppository 10 mg, Once  acetaminophen (TYLENOL) tablet 1,000 mg, 3 times per day  oxyCODONE (ROXICODONE) immediate release tablet 2.5 mg, Q6H PRN  dextrose 5 % and 0.45 % NaCl with KCl 20 mEq infusion, Continuous  promethazine (PHENERGAN) 12.5 mg in sodium chloride 0.9 % 50 mL IVPB, Q6H PRN  levothyroxine (SYNTHROID) tablet 112 mcg, Daily  amitriptyline (ELAVIL) tablet 12.5 mg, Nightly  sertraline (ZOLOFT) tablet 25 mg, Daily  metoclopramide (REGLAN) injection 10 mg, Q6H  amiodarone (CORDARONE) tablet 200 mg, Daily  metoprolol tartrate (LOPRESSOR) tablet 12.5 mg, BID  diazePAM (VALIUM) injection 2.5 mg, Q8H PRN  magnesium sulfate 2000 mg in 50 mL IVPB premix, PRN  sodium phosphate 10 mmol in sodium chloride 0.9 % 250 mL IVPB, PRN   Or  sodium phosphate 15 mmol in sodium chloride 0.9 % 250 mL IVPB, PRN   Or  sodium phosphate 20 mmol in sodium chloride 0.9 % 500 mL IVPB, PRN  pantoprazole (PROTONIX) injection 40 mg, Daily  sodium chloride flush 0.9 % injection 5-40 mL, 2 times per day  sodium chloride flush 0.9 % injection 5-40 mL, PRN  0.9 % sodium chloride infusion, PRN  ondansetron (ZOFRAN-ODT) disintegrating tablet 4 mg, Q8H PRN   Or  ondansetron (ZOFRAN) injection 4 mg, Q6H PRN  polyethylene glycol (GLYCOLAX) packet 17 g, Daily PRN  enoxaparin (LOVENOX) injection 40 mg, Nightly        Objective:  /67   Pulse 60   Temp 97 °F (36.1 °C) (Temporal)   Resp 18   Ht 5' 6\" (1.676 m)   Wt 256 lb 2.8 oz (116.2 kg)   SpO2 98%   BMI 41.35 kg/m²     Intake/Output Summary (Last 24 hours) at 7/7/2022 1416  Last data filed at 7/7/2022 1125  Gross per 24 hour   Intake 710.67 ml   Output 1100 ml   Net -389.33 ml      Wt Readings from Last 3 Encounters:   07/07/22 256 lb 2.8 oz (116.2 kg)   03/24/22 254 lb 12.8 oz (115.6 kg)   09/28/21 250 lb (113.4 kg)       General appearance:  Appears comfortable  Eyes: Sclera clear. Pupils equal.  ENT: Moist oral mucosa. Trachea midline, no adenopathy. Cardiovascular: Regular rhythm, normal S1, S2. No murmur. No edema in lower extremities  Respiratory: Not using accessory muscles. Good inspiratory effort. Clear to auscultation bilaterally, no wheeze or crackles. GI: Abdomen soft, no tenderness, not distended, normal bowel sounds  Musculoskeletal: No cyanosis in digits, neck supple  Neurology: CN 2-12 grossly intact. No speech or motor deficits  Psych: Normal affect. Alert and oriented in time, place and person  Skin: Warm, dry, normal turgor    Labs and Tests:  CBC:   Recent Labs     07/05/22  1148 07/07/22  0539   WBC 8.9 6.7   HGB 13.2 12.9    295     BMP:    Recent Labs     07/05/22  1148 07/07/22  0539    137   K 3.9 4.0    103   CO2 27 29   BUN 15 10   CREATININE 1.0 0.9   GLUCOSE 104* 103*     Hepatic: No results for input(s): AST, ALT, ALB, BILITOT, ALKPHOS in the last 72 hours. Discussed care with patient             Problem List  Principal Problem:    Cecal volvulus ([address]  Active Problems:    LV dysfunction    AMPARO (acute kidney injury) ([address]    Acquired hypothyroidism    GERD (gastroesophageal reflux disease)    Atrial fibrillation (HCC)    Chronic combined systolic (congestive) and diastolic (congestive) heart failure ([address]  Resolved Problems:    * No resolved hospital problems.  *       Assessment & Plan:   Cecal volvulus  -exploratory laparotomy, detorsion of volvulus, right hemicolectomy with primary ileocolonic anastomosis for cecal volvulus  On 6/27/2022  -Postop care per neurosurgery  -Patient having some abdominal distention and pain. Repeat x-ray ordered this morning  -We will continue diet recommendation per general surgery    A. fib with RVR  -Now back to normal rate controlled. Continue amiodarone. Acute kidney injury resolved    Chronic CHF  -Seems to be compensated we will continue to monitor.       Disposition-awaiting x-ray this morning further discharge planning per primary        Diet: Diet NPO Exceptions are: Ice Chips  Code:Full Code  DVT PPX cesar Vargas MD   7/7/2022 2:16 PM

## 2022-07-07 NOTE — PROGRESS NOTES
Nutrition Note    RECOMMENDATIONS  1. PO Diet: Resume diet per surgery orders  2. ONS: Please order Ensure Clear (berry flavor) BID once diet is resumed  3. Nutrition Support: If po diet cannot be resumed today, recommend initiation of TPN. NUTRITION ASSESSMENT   Pt triggered for follow-up. S/p ex lap with right hemicolectomy 6/27. Diet was advanced to regular yesterday but developed copious yellow emesis yesterday afternoon. Now NPO. Refused to have NG tube placed. Per surgery note today, recheck AXR today and if improved, okay to start clear liquid with slow diet progression. Pt's nutrition status is compromised AEB minimal nutritional intake x 10 days. If po diet cannot be resumed today, recommend initiation of TPN. RD will continue to monitor.  Nutrition Related Findings: D5/.45NS with KCl at 50 mL/hr. -0.8L. Lytes obtained today WNL. LBM 7/6, BS+, diarrhea + nausea noted. Trace BUE edema, +1 non-pitting BLE edema.  Wounds: Surgical Incision   Nutrition Education:  Education not indicated    Nutrition Goals: Initiate nutrition support,Initiate PO diet,by next RD assessment     MALNUTRITION ASSESSMENT   Acute Illness  Malnutrition Status:  At risk for malnutrition (Comment)    NUTRITION DIAGNOSIS   · Inadequate oral intake related to altered GI function as evidenced by NPO or clear liquid status due to medical condition    CURRENT NUTRITION THERAPIES  Diet NPO Exceptions are: Ice Chips     PO Intake: NPO   PO Supplement Intake:NPO    Current Parenteral Nutrition Recs:  · Goal PN Orders Provides: Clinimix 5/20 at 83 mL/hr provides 1992 mL total volume, 1755 calories without lipids, 100 grams of protein, and 2.38 mg/kg/min    ANTHROPOMETRICS   Current Height: 5' 6\" (167.6 cm)   Current Weight: 256 lb 2.8 oz (116.2 kg)     Admission weight: 252 lb (114.3 kg) (bed wt)   Ideal Body Weight (IBW): 130 lbs  (59 kg)         BMI: 41.3    COMPARATIVE STANDARDS  Energy (kcal):  912-1710     Protein (g):  118 Fluid (mL/day):  912-1710    The patient will be monitored per nutrition standards of care. Consult dietitian if additional nutrition interventions are needed prior to RD reassessment.      Jodi Auguste MS, RD, LD    Contact: [phone]

## 2022-07-07 NOTE — PROGRESS NOTES
[facility]        Progress Note    Patient Name: Deisi Murphy  MRN: [id number]  YOB: 1945  Date of Evaluation: 2022    Subjective:  No acute events overnight  Feels flatus  No further emesis      Vital Signs:  Patient Vitals for the past 24 hrs:   BP Temp Temp src Pulse Resp SpO2 Weight   22 0815 (!) 141/71 97.1 °F (36.2 °C) Temporal 67 18 95 % --   22 0600 -- -- -- 57 -- -- --   22 0530 (!) 97/49 97.2 °F (36.2 °C) Temporal 62 18 95 % 256 lb 2.8 oz (116.2 kg)   22 0400 -- -- -- 56 -- -- --   22 0200 -- -- -- 65 -- -- --   22 0000 -- -- -- 65 -- -- --   22 2325 (!) 106/51 97.4 °F (36.3 °C) Temporal 65 18 95 % --   22 2200 -- -- -- 65 -- -- --   22 -- -- -- 67 -- -- --   22 1954 122/67 97 °F (36.1 °C) Temporal 65 18 100 % --   22 1540 (!) 151/72 (!) 96.1 °F (35.6 °C) Temporal 66 18 97 % --   22 1115 130/69 (!) 96.3 °F (35.7 °C) Temporal 66 18 98 % --      TEMPERATURE HISTORY 24H: Temp (24hrs), Av.9 °F (36.1 °C), Min:96.1 °F (35.6 °C), Max:97.4 °F (36.3 °C)    BLOOD PRESSURE HISTORY: Systolic (90AGV), WEO:726 , Min:97 , ALS:052    Diastolic (31MZM), DBY:38, Min:49, Max:113      Intake/Output:  I/O last 3 completed shifts: In: 950.7 [P.O.:300;  I.V.:650.7]  Out: 1525 [Urine:825; Emesis/NG output:700]  I/O this shift:  In: -   Out: 200 [Urine:200]  Drain/tube Output:       Physical Exam:  General: awake, alert, oriented to person, place, time  Lungs: unlabored respirations  Abdomen: soft, mildly distended, incisional tenderness only, hypoactive bowel sounds present   Skin/Wound: Clean dressing in place, no cellulitis    Labs:  CBC:    Recent Labs     22  1148 22  0539   WBC 8.9 6.7   HGB 13.2 12.9   HCT 41.0 39.9    295     BMP:    Recent Labs     22  1148 22  0539    137   K 3.9 4.0    103   CO2 27 29   BUN 15 10   CREATININE 1.0 0.9 GLUCOSE 104* 103*     Hepatic:    No results for input(s): AST, ALT, ALB, BILITOT, ALKPHOS in the last 72 hours. Amylase:    Lab Results   Component Value Date/Time    AMYLASE 38 03/20/2017 05:08 PM     Lipase:    Lab Results   Component Value Date/Time    LIPASE 107.0 06/27/2022 08:57 PM    LIPASE 58.0 03/20/2017 05:08 PM    LIPASE 16.0 03/12/2017 06:07 AM      Mag:    Lab Results   Component Value Date/Time    MG 2.00 07/05/2022 11:48 AM    MG 2.10 06/30/2022 08:21 AM     Phos:     Lab Results   Component Value Date/Time    PHOS 2.6 07/05/2022 11:48 AM      Coags:   Lab Results   Component Value Date/Time    PROTIME 11.8 02/18/2019 07:10 AM    INR 1.04 02/18/2019 07:10 AM    APTT 37.2 03/26/2018 08:29 PM       Cultures:  Anaerobic culture  No results found for: LABANAE  Fungus stain  No results found for requested labs within last 30 days. Gram stain  No results found for requested labs within last 30 days. Organism  Lab Results   Component Value Date/Time    ORG Klebsiella pneumoniae (A) 03/24/2022 04:07 PM     Surgical culture  No results found for: CXSURG  Blood culture 1  Results in Past 30 Days  Result Component Current Result Ref Range Previous Result Ref Range   Blood Culture, Routine No Growth after 4 days of incubation. (6/27/2022)  Not in Time Range      Blood culture 2  Results in Past 30 Days  Result Component Current Result Ref Range Previous Result Ref Range   Culture, Blood 2 No Growth after 4 days of incubation. (6/27/2022)  Not in Time Range      Fecal occult  No results found for requested labs within last 30 days. GI bacterial pathogens by PCR  No results found for requested labs within last 30 days. C. difficile  No results found for requested labs within last 30 days.      Urine culture  Lab Results   Component Value Date/Time    LABURIN >100,000 CFU/ml 03/24/2022 04:07 PM       Pathology:  OR 6/27/2022--FINAL DIAGNOSIS:     Colon and terminal ileum, right hemicolectomy: -Congestion of colonic wall consistent with history of volvulus.   -Histologically unremarkable appendix.   -Negative for malignancy. Imaging:  I have personally reviewed the following films:    CT ABDOMEN PELVIS WO CONTRAST Additional Contrast? None    Result Date: 6/27/2022  EXAMINATION: CT OF THE ABDOMEN AND PELVIS WITHOUT CONTRAST 6/27/2022 8:43 pm TECHNIQUE: CT of the abdomen and pelvis was performed without the administration of intravenous contrast. Multiplanar reformatted images are provided for review. Automated exposure control, iterative reconstruction, and/or weight based adjustment of the mA/kV was utilized to reduce the radiation dose to as low as reasonably achievable. COMPARISON: None. HISTORY: ORDERING SYSTEM PROVIDED HISTORY: abd pain n/v TECHNOLOGIST PROVIDED HISTORY: Reason for exam:->abd pain n/v Additional Contrast?->None Decision Support Exception - unselect if not a suspected or confirmed emergency medical condition->Emergency Medical Condition (MA) Reason for Exam: abd pain n/v FINDINGS: Lower Chest: Scarring in the anterior right middle lobe and lingula. No acute findings. Small hiatal hernia. Organs: Evaluation of the abdominal and pelvic viscera is limited in the absence of contrast.  Subtle lobulated liver contour and widening of the fissural spaces suggestive of chronic liver disease. Cholecystectomy. The pancreas, spleen, adrenals and kidneys reveal no acute findings. There is an indeterminate 1.8 cm lesion arising from the posterosuperior pole the left kidney. GI/Bowel: Findings compatible cecal volvulus, with the dilated cecum located in the left upper quadrant measuring up to 10 cm in greatest dimension. Associated twisting of the mesentery and transition point on axial image 155. No small bowel distension. Mesenteric edema is noted. No pneumatosis or gross perforation identified. Distal colonic diverticulosis. Pelvis: No acute findings.  Peritoneum/Retroperitoneum: Trace abdominopelvic ascites. No free air. No lymphadenopathy. Bones/Soft Tissues: No acute osseous abnormality identified. Multilevel degenerative disc disease and facet arthropathy with degenerative grade 1 anterolisthesis of L4.     1.  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 Associated mesenteric edema and trace abdominopelvic ascites. No pneumatosis or gross perforation. Findings were discussed with Apolinar Ahmadi at 9:58 pm on 6/27/2022. 2.  Incidental 1.8 cm indeterminate left renal lesion for which nonemergent follow-up with contrast-enhanced CT or MRI renal mass protocol is recommended. 3.  Morphologic findings suggestive of chronic liver disease. 4.  Additional chronic and benign findings, as above. Scheduled Meds:   acetaminophen  1,000 mg Oral 3 times per day    bisacodyl  10 mg Rectal Once    levothyroxine  112 mcg Oral Daily    amitriptyline  12.5 mg Oral Nightly    sertraline  25 mg Oral Daily    metoclopramide  10 mg IntraVENous Q6H    amiodarone  200 mg Oral Daily    metoprolol tartrate  12.5 mg Oral BID    pantoprazole  40 mg IntraVENous Daily    sodium chloride flush  5-40 mL IntraVENous 2 times per day    enoxaparin  40 mg SubCUTAneous Nightly     Continuous Infusions:   dextrose 5% and 0.45% NaCl with KCl 20 mEq 50 mL/hr at 07/07/22 0620    sodium chloride       PRN Meds:.oxyCODONE, IVPB builder, bisacodyl, diazePAM, magnesium sulfate, sodium phosphate IVPB **OR** sodium phosphate IVPB **OR** sodium phosphate IVPB, sodium chloride flush, sodium chloride, ondansetron **OR** ondansetron, polyethylene glycol      Assessment:  65 y.o. female admitted with   1. Cecal volvulus ([facility] [address]    2.  Septicemia ([facility] [address]    3. Volvulus ([facility] [address]        OR Date 6/27/2022, exploratory laparotomy, detorsion of volvulus, right hemicolectomy with primary ileocolonic anastomosis for cecal volvulus  Hypertension  Acute kidney injury      Plan:    Recheck AXR today  Clears if improved - slow progress  Labs OK  Continue low rate MIVF while not eating    EDUCATION:  Educated patient on plan of care and disease process--all questions answered.     Plans discussed with patient    Signed:      Catrachito Elmore MD

## 2022-07-08 LAB
ANION GAP SERPL CALCULATED.3IONS-SCNC: 7 MMOL/L (ref 3–16)
BASOPHILS ABSOLUTE: 0 K/UL (ref 0–0.2)
BASOPHILS RELATIVE PERCENT: 0.6 %
BUN BLDV-MCNC: 9 MG/DL (ref 7–20)
CALCIUM SERPL-MCNC: 9.2 MG/DL (ref 8.3–10.6)
CHLORIDE BLD-SCNC: 106 MMOL/L (ref 99–110)
CO2: 21 MMOL/L (ref 21–32)
CREAT SERPL-MCNC: 0.9 MG/DL (ref 0.6–1.2)
EOSINOPHILS ABSOLUTE: 0.4 K/UL (ref 0–0.6)
EOSINOPHILS RELATIVE PERCENT: 4.9 %
GFR AFRICAN AMERICAN: >60
GFR NON-AFRICAN AMERICAN: >60
GLUCOSE BLD-MCNC: 99 MG/DL (ref 70–99)
HCT VFR BLD CALC: 42.9 % (ref 36–48)
HEMOGLOBIN: 13.7 G/DL (ref 12–16)
LYMPHOCYTES ABSOLUTE: 1.2 K/UL (ref 1–5.1)
LYMPHOCYTES RELATIVE PERCENT: 15.3 %
MCH RBC QN AUTO: 29.3 PG (ref 26–34)
MCHC RBC AUTO-ENTMCNC: 32 G/DL (ref 31–36)
MCV RBC AUTO: 91.6 FL (ref 80–100)
MONOCYTES ABSOLUTE: 0.7 K/UL (ref 0–1.3)
MONOCYTES RELATIVE PERCENT: 9.2 %
NEUTROPHILS ABSOLUTE: 5.6 K/UL (ref 1.7–7.7)
NEUTROPHILS RELATIVE PERCENT: 70 %
PDW BLD-RTO: 14.9 % (ref 12.4–15.4)
PLATELET # BLD: 279 K/UL (ref 135–450)
PMV BLD AUTO: 7.3 FL (ref 5–10.5)
POTASSIUM SERPL-SCNC: 4.1 MMOL/L (ref 3.5–5.1)
RBC # BLD: 4.68 M/UL (ref 4–5.2)
SODIUM BLD-SCNC: 134 MMOL/L (ref 136–145)
WBC # BLD: 8 K/UL (ref 4–11)

## 2022-07-08 PROCEDURE — 2140000000 HC CCU INTERMEDIATE R&B

## 2022-07-08 PROCEDURE — 6360000002 HC RX W HCPCS: Performed by: INTERNAL MEDICINE

## 2022-07-08 PROCEDURE — 2500000003 HC RX 250 WO HCPCS: Performed by: SURGERY

## 2022-07-08 PROCEDURE — 6360000002 HC RX W HCPCS: Performed by: SURGERY

## 2022-07-08 PROCEDURE — 2580000003 HC RX 258: Performed by: INTERNAL MEDICINE

## 2022-07-08 PROCEDURE — 6370000000 HC RX 637 (ALT 250 FOR IP): Performed by: SURGERY

## 2022-07-08 PROCEDURE — 85025 COMPLETE CBC W/AUTO DIFF WBC: CPT

## 2022-07-08 PROCEDURE — C9113 INJ PANTOPRAZOLE SODIUM, VIA: HCPCS | Performed by: INTERNAL MEDICINE

## 2022-07-08 PROCEDURE — 6370000000 HC RX 637 (ALT 250 FOR IP): Performed by: HOSPITALIST

## 2022-07-08 PROCEDURE — 6370000000 HC RX 637 (ALT 250 FOR IP): Performed by: NURSE PRACTITIONER

## 2022-07-08 PROCEDURE — 80048 BASIC METABOLIC PNL TOTAL CA: CPT

## 2022-07-08 PROCEDURE — 99024 POSTOP FOLLOW-UP VISIT: CPT | Performed by: SURGERY

## 2022-07-08 RX ADMIN — METOCLOPRAMIDE 10 MG: 5 INJECTION, SOLUTION INTRAMUSCULAR; INTRAVENOUS at 02:40

## 2022-07-08 RX ADMIN — METOCLOPRAMIDE 10 MG: 5 INJECTION, SOLUTION INTRAMUSCULAR; INTRAVENOUS at 09:08

## 2022-07-08 RX ADMIN — METOPROLOL TARTRATE 12.5 MG: 25 TABLET, FILM COATED ORAL at 09:08

## 2022-07-08 RX ADMIN — METOCLOPRAMIDE 10 MG: 5 INJECTION, SOLUTION INTRAMUSCULAR; INTRAVENOUS at 14:19

## 2022-07-08 RX ADMIN — ENOXAPARIN SODIUM 40 MG: 100 INJECTION SUBCUTANEOUS at 20:10

## 2022-07-08 RX ADMIN — METOPROLOL TARTRATE 12.5 MG: 25 TABLET, FILM COATED ORAL at 20:12

## 2022-07-08 RX ADMIN — SERTRALINE 25 MG: 25 TABLET, FILM COATED ORAL at 09:08

## 2022-07-08 RX ADMIN — POTASSIUM CHLORIDE, DEXTROSE MONOHYDRATE AND SODIUM CHLORIDE: 150; 5; 450 INJECTION, SOLUTION INTRAVENOUS at 11:00

## 2022-07-08 RX ADMIN — LEVOTHYROXINE SODIUM 112 MCG: 0.11 TABLET ORAL at 05:30

## 2022-07-08 RX ADMIN — PANTOPRAZOLE SODIUM 40 MG: 40 INJECTION, POWDER, LYOPHILIZED, FOR SOLUTION INTRAVENOUS at 09:08

## 2022-07-08 RX ADMIN — ACETAMINOPHEN 1000 MG: 500 TABLET ORAL at 20:11

## 2022-07-08 RX ADMIN — AMITRIPTYLINE HYDROCHLORIDE 12.5 MG: 25 TABLET, FILM COATED ORAL at 20:11

## 2022-07-08 RX ADMIN — Medication 10 ML: at 09:08

## 2022-07-08 RX ADMIN — METOCLOPRAMIDE 10 MG: 5 INJECTION, SOLUTION INTRAMUSCULAR; INTRAVENOUS at 20:11

## 2022-07-08 ASSESSMENT — PAIN SCALES - GENERAL
PAINLEVEL_OUTOF10: 4
PAINLEVEL_OUTOF10: 0

## 2022-07-08 NOTE — CARE COORDINATION
[facility] returned call and pre cert is still pending. Aware pt wanting to go home but will continue pre cert status in case pt would be approved and changes her mind about going home.       Luis Infante RN, BSN  [phone]

## 2022-07-08 NOTE — CARE COORDINATION
CM left vm for Lacey at [address] [phone] to check on pre cert status. Cm spoke to pt and discussed planning for hc if insurance denies snf. She feels like she would want to go home and not to a facility. reveiwed hc agencies and ratings and pt has no preference. CM left vm for Mary Ellen Murdock with [facility] [phone] to see if she has staff available in pt's area to provide services.       Tenzin Onofre RN, BSN  [phone]

## 2022-07-08 NOTE — PROGRESS NOTES
Physical Therapy  Owen Preston  PT treatment attempted. Pt currently eating lunch and visiting with family/friend. Requesting therapy return later this date, reporting has not eaten much this date and is hoping to eat and visit. PT educating pt that therapy cannot guarantee returning this date. Pt verbalized understanding and agreeable to attempting, but also reports that if therapy unable to return, would be getting up with RN later this date. PT encouraging activity/ambulation this date, either with therapy services for RN. Pt verbalized understanding and agreement.   Thank you,  Jon Krueger, PT, DPT, [id number]

## 2022-07-08 NOTE — PROGRESS NOTES
[facility]        Progress Note    Patient Name: Janet Agudelo  MRN: [id number]  YOB: 1945  Date of Evaluation: 2022    Subjective:  No acute events overnight  Had BM and flatus  No N/V  Feeling hungry today      Vital Signs:  Patient Vitals for the past 24 hrs:   BP Temp Temp src Pulse Resp SpO2 Weight   22 1055 137/71 97 °F (36.1 °C) Temporal 60 18 98 % --   22 0900 (!) 144/68 97.1 °F (36.2 °C) Temporal 62 18 96 % --   22 0800 -- -- -- 60 -- -- --   22 0358 119/65 97.1 °F (36.2 °C) Temporal 60 18 97 % 251 lb 1.7 oz (113.9 kg)   22 2345 (!) 146/60 97 °F (36.1 °C) Temporal 66 18 98 % --   22 -- -- -- 60 -- -- --   22 (!) 144/74 97.5 °F (36.4 °C) Temporal 59 18 100 % --      TEMPERATURE HISTORY 24H: Temp (24hrs), Av.1 °F (36.2 °C), Min:97 °F (36.1 °C), Max:97.5 °F (36.4 °C)    BLOOD PRESSURE HISTORY: Systolic (78DPO), NDK:962 , Min:97 , VYL:628    Diastolic (99CKO), SQD:83, Min:49, Max:74      Intake/Output:  I/O last 3 completed shifts: In: 1875.8 [P.O.:60; I.V.:1815.8]  Out: 1200 [Urine:1200]  I/O this shift:  In: 386.2 [I.V.:386.2]  Out: 200 [Urine:200]  Drain/tube Output:       Physical Exam:  General: awake, alert, oriented to person, place, time  Lungs: unlabored respirations  Abdomen: soft, mildly distended, incisional tenderness only, hypoactive bowel sounds present   Skin/Wound: Clean dressing in place, no cellulitis    Labs:  CBC:    Recent Labs     22  0539 22  0407   WBC 6.7 8.0   HGB 12.9 13.7   HCT 39.9 42.9    279     BMP:    Recent Labs     22  0539 22  0407    134*   K 4.0 4.1    106   CO2 29 21   BUN 10 9   CREATININE 0.9 0.9   GLUCOSE 103* 99     Hepatic:    No results for input(s): AST, ALT, ALB, BILITOT, ALKPHOS in the last 72 hours.   Amylase:    Lab Results   Component Value Date/Time    AMYLASE 38 2017 05:08 PM     Lipase: Lab Results   Component Value Date/Time    LIPASE 107.0 06/27/2022 08:57 PM    LIPASE 58.0 03/20/2017 05:08 PM    LIPASE 16.0 03/12/2017 06:07 AM      Mag:    Lab Results   Component Value Date/Time    MG 2.00 07/05/2022 11:48 AM    MG 2.10 06/30/2022 08:21 AM     Phos:     Lab Results   Component Value Date/Time    PHOS 2.6 07/05/2022 11:48 AM      Coags:   Lab Results   Component Value Date/Time    PROTIME 11.8 02/18/2019 07:10 AM    INR 1.04 02/18/2019 07:10 AM    APTT 37.2 03/26/2018 08:29 PM       Cultures:  Anaerobic culture  No results found for: LABANAE  Fungus stain  No results found for requested labs within last 30 days. Gram stain  No results found for requested labs within last 30 days. Organism  Lab Results   Component Value Date/Time    ORG Klebsiella pneumoniae (A) 03/24/2022 04:07 PM     Surgical culture  No results found for: CXSURG  Blood culture 1  Results in Past 30 Days  Result Component Current Result Ref Range Previous Result Ref Range   Blood Culture, Routine No Growth after 4 days of incubation. (6/27/2022)  Not in Time Range      Blood culture 2  Results in Past 30 Days  Result Component Current Result Ref Range Previous Result Ref Range   Culture, Blood 2 No Growth after 4 days of incubation. (6/27/2022)  Not in Time Range      Fecal occult  No results found for requested labs within last 30 days. GI bacterial pathogens by PCR  No results found for requested labs within last 30 days. C. difficile  No results found for requested labs within last 30 days. Urine culture  Lab Results   Component Value Date/Time    LABURIN >100,000 CFU/ml 03/24/2022 04:07 PM       Pathology:  OR 6/27/2022--FINAL DIAGNOSIS:     Colon and terminal ileum, right hemicolectomy:   -Congestion of colonic wall consistent with history of volvulus.   -Histologically unremarkable appendix.   -Negative for malignancy.      Imaging:  I have personally reviewed the following films:    CT ABDOMEN PELVIS WO CONTRAST Additional Contrast? None    Result Date: 6/27/2022  EXAMINATION: CT OF THE ABDOMEN AND PELVIS WITHOUT CONTRAST 6/27/2022 8:43 pm TECHNIQUE: CT of the abdomen and pelvis was performed without the administration of intravenous contrast. Multiplanar reformatted images are provided for review. Automated exposure control, iterative reconstruction, and/or weight based adjustment of the mA/kV was utilized to reduce the radiation dose to as low as reasonably achievable. COMPARISON: None. HISTORY: ORDERING SYSTEM PROVIDED HISTORY: abd pain n/v TECHNOLOGIST PROVIDED HISTORY: Reason for exam:->abd pain n/v Additional Contrast?->None Decision Support Exception - unselect if not a suspected or confirmed emergency medical condition->Emergency Medical Condition (MA) Reason for Exam: abd pain n/v FINDINGS: Lower Chest: Scarring in the anterior right middle lobe and lingula. No acute findings. Small hiatal hernia. Organs: Evaluation of the abdominal and pelvic viscera is limited in the absence of contrast.  Subtle lobulated liver contour and widening of the fissural spaces suggestive of chronic liver disease. Cholecystectomy. The pancreas, spleen, adrenals and kidneys reveal no acute findings. There is an indeterminate 1.8 cm lesion arising from the posterosuperior pole the left kidney. GI/Bowel: Findings compatible cecal volvulus, with the dilated cecum located in the left upper quadrant measuring up to 10 cm in greatest dimension. Associated twisting of the mesentery and transition point on axial image 155. No small bowel distension. Mesenteric edema is noted. No pneumatosis or gross perforation identified. Distal colonic diverticulosis. Pelvis: No acute findings. Peritoneum/Retroperitoneum: Trace abdominopelvic ascites. No free air. No lymphadenopathy. Bones/Soft Tissues: No acute osseous abnormality identified.   Multilevel degenerative disc disease and facet arthropathy with degenerative grade 1 anterolisthesis of L4.     1.  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 Associated mesenteric edema and trace abdominopelvic ascites. No pneumatosis or gross perforation. Findings were discussed with Jessee Carroll at 9:58 pm on 6/27/2022. 2.  Incidental 1.8 cm indeterminate left renal lesion for which nonemergent follow-up with contrast-enhanced CT or MRI renal mass protocol is recommended. 3.  Morphologic findings suggestive of chronic liver disease. 4.  Additional chronic and benign findings, as above. Scheduled Meds:   bisacodyl  10 mg Rectal Once    acetaminophen  1,000 mg Oral 3 times per day    levothyroxine  112 mcg Oral Daily    amitriptyline  12.5 mg Oral Nightly    sertraline  25 mg Oral Daily    metoclopramide  10 mg IntraVENous Q6H    amiodarone  200 mg Oral Daily    metoprolol tartrate  12.5 mg Oral BID    pantoprazole  40 mg IntraVENous Daily    sodium chloride flush  5-40 mL IntraVENous 2 times per day    enoxaparin  40 mg SubCUTAneous Nightly     Continuous Infusions:   dextrose 5% and 0.45% NaCl with KCl 20 mEq 50 mL/hr at 07/08/22 1416    sodium chloride       PRN Meds:.oxyCODONE, IVPB builder, diazePAM, magnesium sulfate, sodium phosphate IVPB **OR** sodium phosphate IVPB **OR** sodium phosphate IVPB, sodium chloride flush, sodium chloride, ondansetron **OR** ondansetron, polyethylene glycol      Assessment:  65 y.o. female admitted with   1. Cecal volvulus ([facility] [address]    2. Septicemia ([facility] [address]    3. Volvulus ([facility] [address]        OR Date 6/27/2022, exploratory laparotomy, detorsion of volvulus, right hemicolectomy with primary ileocolonic anastomosis for cecal volvulus  Hypertension  Acute kidney injury      Plan:    Trial of adv diet today  Labs OK  Continue low rate MIVF while not eating  Hopeful for DC this weekend if zunilda po well    EDUCATION:  Educated patient on plan of care and disease process--all questions answered.     Plans discussed with patient    Signed:      Geremias Morgan MD

## 2022-07-08 NOTE — DISCHARGE INSTR - COC
Continuity of Care Form    Patient Name: Ana Watson   :  1945  MRN:  [id number]    Admit date:  2022  Discharge date:  22    Code Status Order: Full Code   Advance Directives:   Advance Care Flowsheet Documentation       Date/Time Healthcare Directive Type of Healthcare Directive Copy in [address] Agent's Name Healthcare Agent's Phone Number    22 2342 No, patient does not have an advance directive for healthcare treatment -- -- -- -- --            Admitting Physician:  Rosalba Painting MD  PCP: Valeria Black, APRN - CNP    Discharging Nurse: Aleshia Griffin RN  [address] Unit/Room#: CVU-2917/2917-01  Discharging Unit Phone Number: [phone]    Emergency Contact:   Extended Emergency Contact Information  Primary Emergency Contact: Cassi Daley[address] Phone: [phone]  Relation: Child    Past Surgical History:  Past Surgical History:   Procedure Laterality Date    CARDIAC SURGERY      minimaze    CARDIOVERSION  2018    CHOLECYSTECTOMY, LAPAROSCOPIC  03/10/2017    with gram     HEMICOLECTOMY N/A 2022    EXPLORATORY LAPAROTOMY, RIGHT HEMICOLECTOMY performed by Eden Brandt MD at [address] [place] [address]       Immunization History:   Immunization History   Administered Date(s) Administered    COVID-19, PFIZER PURPLE top, DILUTE for use, (age 15 y+), 30mcg/0.3mL 2021, 2021    Influenza, High Dose (Fluzone 65 yrs and older) 10/15/2015    Pneumococcal Polysaccharide ([id number]) 2019       Active Problems:  Patient Active Problem List   Diagnosis Code    Acquired hypothyroidism E03.9    GERD (gastroesophageal reflux disease) K21.9    Diverticulosis K57.90    Depression F32. A    Atrial fibrillation (HCC) I48.91    Knee osteoarthritis M17.10    Cholecystitis K81.9    Acute calculous cholecystitis K80.00    Episodic cluster headache, not intractable G44.019    SOB (shortness of breath) R06.02    Obesity (BMI 30-39. 9) E66.9    Dilated cardiomyopathy (HCC) I42.0    Herpes zoster without complication K13.7    Benign essential HTN I10    Severe single current episode of major depressive disorder, without psychotic features ([facility] F32.2    Chronic anxiety F41.9    Chronic congestive heart failure (HCC) I50.9    Chronic combined systolic (congestive) and diastolic (congestive) heart failure (HCC) I50.42    PAF (paroxysmal atrial fibrillation) (HCC) I48.0    Atypical atrial flutter (HCC) I48.4    Typical atrial flutter (HCC) I48.3    Morbid obesity due to excess calories ([facility]) E66.01    Cecal volvulus (HCC) K56.2    LV dysfunction I51.9    AMPARO (acute kidney injury) ([facility] N17.9       Isolation/Infection:   Isolation            No Isolation          Patient Infection Status       None to display            Nurse Assessment:  Last Vital Signs: BP (!) 144/68   Pulse 62   Temp 97.1 °F (36.2 °C) (Temporal)   Resp 18   Ht 5' 6\" (1.676 m)   Wt 251 lb 1.7 oz (113.9 kg)   SpO2 96%   BMI 40.53 kg/m²     Last documented pain score (0-10 scale): Pain Level: 0  Last Weight:   Wt Readings from Last 1 Encounters:   07/08/22 251 lb 1.7 oz (113.9 kg)     Mental Status:  oriented, alert, coherent, logical, thought processes intact, and able to concentrate and follow conversation    IV Access:  - None    Nursing Mobility/ADLs:  Walking   Independent  Transfer  Independent  Bathing  Independent  Dressing  Independent  Toileting  Independent  Feeding  Independent  Med Admin  Independent  Med Delivery   whole    Wound Care Documentation and Therapy:  Incision 06/28/22 Abdomen Medial;Anterior (Active)   Dressing Status Clean;Dry; Intact 07/08/22 0000   Incision Cleansed Not Cleansed 07/07/22 0530   Dressing/Treatment Dry dressing; Adhesive bandage 07/08/22 0000   Closure Staples 07/08/22 0000   Drainage Amount None 07/08/22 0000   Drainage Description Serosanguinous 07/07/22 0530   Odor None 07/08/22 0000   Linda-incision Assessment Intact 07/06/22 1540   Number of days: 10        Elimination:  Continence: Bowel: Yes  Bladder: Yes  Urinary Catheter: None   Colostomy/Ileostomy/Ileal Conduit: No       Date of Last BM: 7/9/22    Intake/Output Summary (Last 24 hours) at 7/8/2022 0905  Last data filed at 7/8/2022 0631  Gross per 24 hour   Intake 1165.09 ml   Output 800 ml   Net 365.09 ml     I/O last 3 completed shifts: In: 1875.8 [P.O.:60; I.V.:1815.8]  Out: 1200 [Urine:1200]    Safety Concerns: At Risk for Falls    Impairments/Disabilities:      None    Nutrition Therapy:  Current Nutrition Therapy:   - Oral Diet:  General    Routes of Feeding: Oral  Liquids: Thin Liquids  Daily Fluid Restriction: no  Last Modified Barium Swallow with Video (Video Swallowing Test): not done    Treatments at the Time of Hospital Discharge:   Respiratory Treatments: none  Oxygen Therapy:  is not on home oxygen therapy.   Ventilator:    - No ventilator support    Rehab Therapies: Physical Therapy and Occupational Therapy  Weight Bearing Status/Restrictions: No weight bearing restrictions  Other Medical Equipment (for information only, NOT a DME order):  walker  Other Treatments: none    Patient's personal belongings (please select all that are sent with patient):  Glasses    RN SIGNATURE:  Electronically signed by Yuliana Agudelo RN on 7/9/22 at 12:19 PM EDT    CASE MANAGEMENT/SOCIAL WORK SECTION    Inpatient Status Date: 6/27/2022    Readmission Risk Assessment Score:  Readmission Risk              Risk of Unplanned Readmission:  13           Discharging to Facility/ Agency   Name: [facility]   Address:  [facility]:[phone]  Fax:[phone]    Dialysis Facility (if applicable)   Name:  Address:  Dialysis Schedule:  Phone:  Fax:    / signature: Vicky Castro RN, BSN  [phone]     PHYSICIAN SECTION    Prognosis: Good    Condition at Discharge: Stable    Rehab Potential (if transferring to

## 2022-07-08 NOTE — CARE COORDINATION
[facility] accepted patient. Pt updated and provided hc business card and placed information on maria ines.       Bluford Libman, RN, BSN  [phone]

## 2022-07-08 NOTE — PROGRESS NOTES
[facility] PROGRESS NOTE    7/8/2022 1:35 PM        Name: Niki Delacruz . Admitted: 6/27/2022  Primary Care Provider: CARLIE Hernandez CNP (Tel: [phone])                        Subjective:  . Still having some abdominal pain with small distention. No further nausea.   Having some flatus    Reviewed interval ancillary notes    Current Medications  bisacodyl (DULCOLAX) suppository 10 mg, Once  acetaminophen (TYLENOL) tablet 1,000 mg, 3 times per day  oxyCODONE (ROXICODONE) immediate release tablet 2.5 mg, Q6H PRN  dextrose 5 % and 0.45 % NaCl with KCl 20 mEq infusion, Continuous  promethazine (PHENERGAN) 12.5 mg in sodium chloride 0.9 % 50 mL IVPB, Q6H PRN  levothyroxine (SYNTHROID) tablet 112 mcg, Daily  amitriptyline (ELAVIL) tablet 12.5 mg, Nightly  sertraline (ZOLOFT) tablet 25 mg, Daily  metoclopramide (REGLAN) injection 10 mg, Q6H  amiodarone (CORDARONE) tablet 200 mg, Daily  metoprolol tartrate (LOPRESSOR) tablet 12.5 mg, BID  diazePAM (VALIUM) injection 2.5 mg, Q8H PRN  magnesium sulfate 2000 mg in 50 mL IVPB premix, PRN  sodium phosphate 10 mmol in sodium chloride 0.9 % 250 mL IVPB, PRN   Or  sodium phosphate 15 mmol in sodium chloride 0.9 % 250 mL IVPB, PRN   Or  sodium phosphate 20 mmol in sodium chloride 0.9 % 500 mL IVPB, PRN  pantoprazole (PROTONIX) injection 40 mg, Daily  sodium chloride flush 0.9 % injection 5-40 mL, 2 times per day  sodium chloride flush 0.9 % injection 5-40 mL, PRN  0.9 % sodium chloride infusion, PRN  ondansetron (ZOFRAN-ODT) disintegrating tablet 4 mg, Q8H PRN   Or  ondansetron (ZOFRAN) injection 4 mg, Q6H PRN  polyethylene glycol (GLYCOLAX) packet 17 g, Daily PRN  enoxaparin (LOVENOX) injection 40 mg, Nightly        Objective:  /71   Pulse 60   Temp 97 °F (36.1 °C) (Temporal)   Resp 18   Ht 5' 6\" (1.676 m)   Wt 251 lb 1.7 oz (113.9 kg)   SpO2 98%   BMI 40.53 kg/m²     Intake/Output Summary (Last 24 hours) at 7/8/2022 1335  Last data filed at 7/8/2022 0900  Gross per 24 hour   Intake 1165.09 ml   Output 1000 ml   Net 165.09 ml      Wt Readings from Last 3 Encounters:   07/08/22 251 lb 1.7 oz (113.9 kg)   03/24/22 254 lb 12.8 oz (115.6 kg)   09/28/21 250 lb (113.4 kg)       General appearance:  Appears comfortable  Eyes: Sclera clear. Pupils equal.  ENT: Moist oral mucosa. Trachea midline, no adenopathy. Cardiovascular: Regular rhythm, normal S1, S2. No murmur. No edema in lower extremities  Respiratory: Not using accessory muscles. Good inspiratory effort. Clear to auscultation bilaterally, no wheeze or crackles. GI: Abdomen soft, no tenderness, not distended, normal bowel sounds  Musculoskeletal: No cyanosis in digits, neck supple  Neurology: CN 2-12 grossly intact. No speech or motor deficits  Psych: Normal affect. Alert and oriented in time, place and person  Skin: Warm, dry, normal turgor    Labs and Tests:  CBC:   Recent Labs     07/07/22  0539 07/08/22  0407   WBC 6.7 8.0   HGB 12.9 13.7    279     BMP:    Recent Labs     07/07/22  0539 07/08/22  0407    134*   K 4.0 4.1    106   CO2 29 21   BUN 10 9   CREATININE 0.9 0.9   GLUCOSE 103* 99     Hepatic: No results for input(s): AST, ALT, ALB, BILITOT, ALKPHOS in the last 72 hours. Discussed care with patient             Problem List  Principal Problem:    Cecal volvulus ([address]  Active Problems:    LV dysfunction    AMPARO (acute kidney injury) ([address]    Acquired hypothyroidism    GERD (gastroesophageal reflux disease)    Atrial fibrillation (HCC)    Chronic combined systolic (congestive) and diastolic (congestive) heart failure ([address]  Resolved Problems:    * No resolved hospital problems.  *       Assessment & Plan:   Cecal volvulus  -exploratory laparotomy, detorsion of volvulus, right hemicolectomy with primary ileocolonic anastomosis for cecal volvulus  On 6/27/2022  -Postop care per neurosurgery  -Patient having some abdominal distention and pain. Repeat x-ray ordered this morning  -We will continue diet recommendation per general surgery    A. fib with RVR  -Now back to normal rate controlled. Continue amiodarone. Acute kidney injury resolved    Chronic CHF  -Seems to be compensated we will continue to monitor. Disposition-awaiting x-ray this morning further discharge planning per primary        Diet: ADULT DIET;  Regular; Low Fiber  ADULT ORAL NUTRITION SUPPLEMENT; Breakfast; Standard 4 oz Oral Supplement  Code:Full Code  DVT PPX lovenox       Marti Zheng MD   7/8/2022 1:35 PM

## 2022-07-09 VITALS
WEIGHT: 251.54 LBS | OXYGEN SATURATION: 99 % | RESPIRATION RATE: 18 BRPM | SYSTOLIC BLOOD PRESSURE: 134 MMHG | HEIGHT: 66 IN | TEMPERATURE: 97.6 F | BODY MASS INDEX: 40.43 KG/M2 | HEART RATE: 70 BPM | DIASTOLIC BLOOD PRESSURE: 66 MMHG

## 2022-07-09 LAB
ANION GAP SERPL CALCULATED.3IONS-SCNC: 5 MMOL/L (ref 3–16)
BASOPHILS ABSOLUTE: 0 K/UL (ref 0–0.2)
BASOPHILS RELATIVE PERCENT: 0.6 %
BUN BLDV-MCNC: 6 MG/DL (ref 7–20)
CALCIUM SERPL-MCNC: 9 MG/DL (ref 8.3–10.6)
CHLORIDE BLD-SCNC: 107 MMOL/L (ref 99–110)
CO2: 26 MMOL/L (ref 21–32)
CREAT SERPL-MCNC: 0.9 MG/DL (ref 0.6–1.2)
EOSINOPHILS ABSOLUTE: 0.3 K/UL (ref 0–0.6)
EOSINOPHILS RELATIVE PERCENT: 4.8 %
GFR AFRICAN AMERICAN: >60
GFR NON-AFRICAN AMERICAN: >60
GLUCOSE BLD-MCNC: 94 MG/DL (ref 70–99)
HCT VFR BLD CALC: 37.6 % (ref 36–48)
HEMOGLOBIN: 12.4 G/DL (ref 12–16)
LYMPHOCYTES ABSOLUTE: 1.3 K/UL (ref 1–5.1)
LYMPHOCYTES RELATIVE PERCENT: 19.6 %
MCH RBC QN AUTO: 28.9 PG (ref 26–34)
MCHC RBC AUTO-ENTMCNC: 33 G/DL (ref 31–36)
MCV RBC AUTO: 87.7 FL (ref 80–100)
MONOCYTES ABSOLUTE: 0.7 K/UL (ref 0–1.3)
MONOCYTES RELATIVE PERCENT: 10 %
NEUTROPHILS ABSOLUTE: 4.3 K/UL (ref 1.7–7.7)
NEUTROPHILS RELATIVE PERCENT: 65 %
PDW BLD-RTO: 14 % (ref 12.4–15.4)
PLATELET # BLD: 285 K/UL (ref 135–450)
PMV BLD AUTO: 8 FL (ref 5–10.5)
POTASSIUM SERPL-SCNC: 4.3 MMOL/L (ref 3.5–5.1)
RBC # BLD: 4.29 M/UL (ref 4–5.2)
SODIUM BLD-SCNC: 138 MMOL/L (ref 136–145)
WBC # BLD: 6.5 K/UL (ref 4–11)

## 2022-07-09 PROCEDURE — 6370000000 HC RX 637 (ALT 250 FOR IP): Performed by: HOSPITALIST

## 2022-07-09 PROCEDURE — C9113 INJ PANTOPRAZOLE SODIUM, VIA: HCPCS | Performed by: INTERNAL MEDICINE

## 2022-07-09 PROCEDURE — 80048 BASIC METABOLIC PNL TOTAL CA: CPT

## 2022-07-09 PROCEDURE — 6360000002 HC RX W HCPCS: Performed by: INTERNAL MEDICINE

## 2022-07-09 PROCEDURE — 85025 COMPLETE CBC W/AUTO DIFF WBC: CPT

## 2022-07-09 PROCEDURE — 2580000003 HC RX 258: Performed by: INTERNAL MEDICINE

## 2022-07-09 PROCEDURE — 6360000002 HC RX W HCPCS: Performed by: SURGERY

## 2022-07-09 PROCEDURE — 6370000000 HC RX 637 (ALT 250 FOR IP): Performed by: NURSE PRACTITIONER

## 2022-07-09 RX ORDER — AMIODARONE HYDROCHLORIDE 200 MG/1
200 TABLET ORAL DAILY
Qty: 30 TABLET | Refills: 0 | Status: SHIPPED | OUTPATIENT
Start: 2022-07-10 | End: 2022-07-09 | Stop reason: HOSPADM

## 2022-07-09 RX ORDER — LISINOPRIL 20 MG/1
10 TABLET ORAL DAILY
Qty: 30 TABLET | Refills: 0 | Status: SHIPPED | OUTPATIENT
Start: 2022-07-09 | End: 2022-10-11 | Stop reason: SDUPTHER

## 2022-07-09 RX ORDER — SERTRALINE HYDROCHLORIDE 25 MG/1
25 TABLET, FILM COATED ORAL DAILY
Qty: 30 TABLET | Refills: 3 | Status: SHIPPED | OUTPATIENT
Start: 2022-07-10 | End: 2022-10-11 | Stop reason: SDUPTHER

## 2022-07-09 RX ADMIN — METOCLOPRAMIDE 10 MG: 5 INJECTION, SOLUTION INTRAMUSCULAR; INTRAVENOUS at 09:00

## 2022-07-09 RX ADMIN — SERTRALINE 25 MG: 25 TABLET, FILM COATED ORAL at 09:26

## 2022-07-09 RX ADMIN — LEVOTHYROXINE SODIUM 112 MCG: 0.11 TABLET ORAL at 09:26

## 2022-07-09 RX ADMIN — METOCLOPRAMIDE 10 MG: 5 INJECTION, SOLUTION INTRAMUSCULAR; INTRAVENOUS at 02:00

## 2022-07-09 RX ADMIN — METOPROLOL TARTRATE 12.5 MG: 25 TABLET, FILM COATED ORAL at 09:27

## 2022-07-09 RX ADMIN — Medication 5 ML: at 09:38

## 2022-07-09 RX ADMIN — PANTOPRAZOLE SODIUM 40 MG: 40 INJECTION, POWDER, LYOPHILIZED, FOR SOLUTION INTRAVENOUS at 09:26

## 2022-07-11 ENCOUNTER — CARE COORDINATION (OUTPATIENT)
Dept: CASE MANAGEMENT | Age: 77
End: 2022-07-11

## 2022-07-11 NOTE — CARE COORDINATION
[facility] Trans[facility] Initial Follow Up Call    Call within 2 business days of discharge: Yes    Patient: Lucina Greer Patient : 1945   MRN: [id number]  Reason for Admission: expl lap  Discharge Date: 22 RARS: Readmission Risk Score: 8.9 ( )    First attempt at 24 hour discharge call, no answer, CTN left  with contact information and request for return call. CTN will continue with outreach call attempts. CTN contacted \"Teresa\" with [facility]. Patient is declining all [facility] services out of fear that someone will bring COVID into her home. She does have Teresa's contact information if she should change her mind.           Follow Up  Future Appointments   Date Time Provider [place]   2022  3:30 PM Macrina Greer MD FF Cardio [facility]       Lettie Nageotte, RN

## 2022-07-12 ENCOUNTER — CARE COORDINATION (OUTPATIENT)
Dept: CASE MANAGEMENT | Age: 77
End: 2022-07-12

## 2022-07-12 NOTE — CARE COORDINATION
[facility] Trans[facility] Initial Follow Up Call    Call within 2 business days of discharge: Yes    Patient: Wendy Baxter Patient : 1945   MRN: [id number]  Reason for Admission: Cecal volvulus;ex lap; detorsion of volvulus, right hemicolectomy with primary ileocolonic anastomosis for cecal volvulus  Discharge Date: 22 RARS: Readmission Risk Score: 8.9 ( )    Second & final attempt at 24 hour discharge call, no answer, CTN left VM with contact information and request for return call. Patient is not active in My Chart and has a follow up scheduled with Dr. Kameron Hsu 22.       Follow Up  Future Appointments   Date Time Provider [place] Gay   2022 12:40 PM Calvin Soto MD [facility]   2022  3:30 PM Carole Babinski, MD [facility]       Mayo Jarquin RN

## 2022-07-19 ENCOUNTER — OFFICE VISIT (OUTPATIENT)
Dept: SURGERY | Age: 77
End: 2022-07-19

## 2022-07-19 VITALS — SYSTOLIC BLOOD PRESSURE: 122 MMHG | BODY MASS INDEX: 39.41 KG/M2 | DIASTOLIC BLOOD PRESSURE: 75 MMHG | WEIGHT: 244.2 LBS

## 2022-07-19 DIAGNOSIS — K56.2 CECAL VOLVULUS (HCC): Primary | ICD-10-CM

## 2022-07-19 PROCEDURE — 99024 POSTOP FOLLOW-UP VISIT: CPT | Performed by: SURGERY

## 2022-07-19 NOTE — LETTER
[address]  [address] [address]  Phone: [phone]  Fax: [phone]    Jeremías Bustos MD    July 19, 2022     CARLIE Bennett - [employer]  [facility]    Patient: Owen Preston   MR Number: [id number]   YOB: 1945   Date of Visit: 7/19/2022       Dear Lucero Cortez: Thank you for referring Owen Preston to me for evaluation/treatment. Below are the relevant portions of my assessment and plan of care. If you have questions, please do not hesitate to call me. I look forward to following Stuart along with you.     Sincerely,      Jeremías Bustos MD

## 2022-07-19 NOTE — PROGRESS NOTES
[facility] GENERAL AND LAPAROSCOPIC SURGERY          PATIENT NAME: Juju Rivas     TODAY'S DATE: 7/19/2022    SUBJECTIVE:    Pt s/p ex lap, right hemicolectomy for cecal volvulus. No concerns; active and driving, eating well, no N/V/D, having BM. OBJECTIVE:  VITALS:  Wt 244 lb 3.2 oz (110.8 kg)   BMI 39.41 kg/m²     CONSTITUTIONAL:  awake and alert  LUNGS:  clear to auscultation  ABDOMEN:  normal bowel sounds, soft, non-distended, non-tender, incision C,D,! Data:    Radiology Review:  None    PATHOLOGY  FINAL DIAGNOSIS:     Colon and terminal ileum, right hemicolectomy:   -Congestion of colonic wall consistent with history of volvulus.   -Histologically unremarkable appendix.   -Negative for malignancy.        ASSESSMENT AND PLAN:    S/P Right colectomy for cecal volvulus    Doing well  Staples removed  Activity and diet reviewed, all questions answered  Will see me kirk Allred MD

## 2022-07-30 NOTE — DISCHARGE SUMMARY
[facility] DISCHARGE SUMMARY    Patient Demographics    Patient. Pavithra Knight  Date of Birth. 1945  MRN. [id number]     Primary care provider. CARLIE Gross CNP  (Tel: [phone])    Admit date: 6/27/2022    Discharge date (blank if same as Note Date): 7/9/2022  Note Date: 7/30/2022     Reason for Hospitalization. Chief Complaint   Patient presents with    Flank Pain     right and left flank pain since this am with emesis            [facility]. Cecal volvulus  -Patient was with acute abdominal pain. Found to have cecal volvulus underwent exploratory laparotomy, detorsion of volvulus, right hemicolectomy with primary ileocolonic anastomosis for cecal volvulu 6/27/2023. Patient slow improvement. Patient continued to improve and return of bowel function. Patient was medically stable at time of discharge Hospital course was complicated with A. fib RVR after Restarting amiodarone patient rate was controlled acute kidney injury which resolved. Consults. IP CONSULT TO GENERAL SURGERY  IP CONSULT TO CARDIOLOGY  IP CONSULT TO HOME CARE NEEDS    Physical examination on discharge day. /66   Pulse 70   Temp 97.6 °F (36.4 °C) (Temporal)   Resp 18   Ht 5' 6\" (1.676 m)   Wt 251 lb 8.7 oz (114.1 kg)   SpO2 99%   BMI 40.60 kg/m²   General appearance. Alert. Looks comfortable. HEENT. Sclera clear. Moist mucus membranes. Cardiovascular. Regular rate and rhythm, normal S1, S2. No murmur. Respiratory. Not using accessory muscles. Clear to auscultation bilaterally, no wheeze. Gastrointestinal. Abdomen soft, non-tender, not distended, normal bowel sounds  Neurology. Facial symmetry. No speech deficits. Moving all extremities equally. Extremities. No edema in lower extremities. Skin.  Warm, dry, normal turgor    Condition at time of discharge stable     Medication instructions provided to patient at discharge. Medication List        CHANGE how you take these medications      metoprolol tartrate 25 MG tablet  Commonly known as: LOPRESSOR  Take 0.5 tablets by mouth 2 times daily  What changed:   medication strength  how much to take  when to take this  Notes to patient: Use:  Lowers blood pressure and heart rate, takes workload off heart  Side effects:  Tiredness, shortness of breath, trouble sleeping, impotence     sertraline 25 MG tablet  Commonly known as: ZOLOFT  Take 1 tablet by mouth daily  What changed:   medication strength  how much to take            CONTINUE taking these medications      albuterol sulfate  (90 Base) MCG/ACT inhaler  Commonly known as: Ventolin HFA  Inhale 2 puffs into the lungs 4 times daily as needed for Wheezing  Notes to patient: Use:bronchodilator, to open airways, rescue inhaler  Side effects: nervousness, jitteriness, shakiness, headache, fast heartbeat     amitriptyline 25 MG tablet  Commonly known as: ELAVIL  Take 1 tablet by mouth nightly     clonazePAM 1 MG tablet  Commonly known as: KlonoPIN  Take 1 tablet by mouth nightly as needed for Anxiety for up to 90 days. furosemide 40 MG tablet  Commonly known as: LASIX  TAKE 1 TABLET DAILY  Notes to patient: Use: treat heart failure, fluid retention, lower blood pressure.        Side effects: frequent urination, weakness, muscle cramps, increased sensitivity to light, nausea, and dizziness     levothyroxine 112 MCG tablet  Commonly known as: SYNTHROID  Take 1 tablet by mouth Daily  Notes to patient: Use: to treat low thyroid levels  Side effects: hair loss, chest pain, irregular heartbeat, irritability, insomnia      lisinopril 20 MG tablet  Commonly known as: PRINIVIL;ZESTRIL  Take 0.5 tablets by mouth daily  Notes to patient: Use:  Lowers blood pressure and takes workload off heart  Side Effects: Dry cough, dizziness, drowsiness, sensitivity to the sun     pantoprazole 20 MG tablet  Commonly known as: PROTONIX  Take 1 tablet by mouth every morning (before breakfast)  Notes to patient: Use: reduces stomach acid, protect the digestive tract  Side effects: rare but can cause dizziness, headache, or diarrhea               Where to Get Your Medications        These medications were sent to [address], [place], [address] [place] [id number]      Phone: [phone]   lisinopril 20 MG tablet  metoprolol tartrate 25 MG tablet  sertraline 25 MG tablet         Discharge recommendations given to patient. Follow Up. pcp in 1 week   Disposition. home  Activity. activity as tolerated  Diet: No diet orders on file      Spent 45  minutes in discharge process.     Signed:  Rock Davide MD     7/30/2022 4:33 PM

## 2022-08-22 DIAGNOSIS — I10 ESSENTIAL HYPERTENSION: ICD-10-CM

## 2022-08-22 RX ORDER — FUROSEMIDE 40 MG/1
TABLET ORAL
Qty: 90 TABLET | Refills: 1 | Status: SHIPPED | OUTPATIENT
Start: 2022-08-22 | End: 2022-10-11 | Stop reason: SDUPTHER

## 2022-09-05 DIAGNOSIS — E03.9 ACQUIRED HYPOTHYROIDISM: ICD-10-CM

## 2022-09-05 DIAGNOSIS — K21.9 GASTROESOPHAGEAL REFLUX DISEASE WITHOUT ESOPHAGITIS: ICD-10-CM

## 2022-09-06 PROBLEM — Z91.89 AT RISK FOR SLEEP APNEA: Status: ACTIVE | Noted: 2022-09-06

## 2022-09-06 RX ORDER — PANTOPRAZOLE SODIUM 20 MG/1
TABLET, DELAYED RELEASE ORAL
Qty: 90 TABLET | Refills: 0 | Status: SHIPPED | OUTPATIENT
Start: 2022-09-06 | End: 2022-10-11 | Stop reason: SDUPTHER

## 2022-09-06 RX ORDER — LEVOTHYROXINE SODIUM 112 UG/1
TABLET ORAL
Qty: 90 TABLET | Refills: 0 | Status: SHIPPED | OUTPATIENT
Start: 2022-09-06 | End: 2022-10-11 | Stop reason: SDUPTHER

## 2022-09-06 NOTE — PROGRESS NOTES
[address]   Electrophysiology Follow up   Date: 9/14/2022  I had the privilege of visiting Michelle Sheikh in the office. CC: atrial fibrillation    HPI: Michelle Sheikh is a 65 y.o. female with a past medical history of Atrial fibrillation, DVT, HTN,  history of multiple cardioversions  and MAZE procedure with DASHAWN closure 2011 confirmed by KAROLINA. S/p RFA with PVI - left sided atrial flutter with CTI dependent flutter, roof line ,anterior of the LA and CAFE 02/21/2019     Admitted 06/27/2022 with abdominal pain. CT showed Cecal volvus. S/P exploratory laparotomy, detorsion of volvus, right hemicolectomy with primary ileocolonic anastomosis    06/28/2022. Noted to have post-op afib with RVR . Planned to treat  with amiodarone bolus. Patient refused . Treated with cardizem drip. And oral amiodarone while inpatient. [facility]  planned for follow appt. Ordered 07/01/2022     Interval History:  After her admission, she slowly felt better. She has not had any palpitations since. Assessment and plan:   Persistent Atrial Fibrillation    EKG today sinus rhythm    Monitor for follow up ordered 07/01/2022     Continue lopressor 12.5 mg BID    In the setting of post op abdominal surgery  recovery     Last seen prior to recent hospitalization 04/2019.had been doing well without any significant episodes of symptomatic atrial fibrillation. S/p  RFA left sied atrial flutter with CTI dependent flutter, roof line and CAFE 02/2019    S/p MAZE 2011     ~ no AC - DASHAWN closure confirmed with KAROLINA 02/2019   We will proceed with the 30-day event monitor to assess for any episodes of A. fib that is not symptomatic. Continue current management. Further treatment will depend on, symptoms, frequency and burden of arrhythmias.       Hypothyroid   TSH 06/30/2022  6.57    Synthroid 112 mcg    Adjustments per PCP       History of LV dysfunction/  Chronic diastolic   heart failure    Echo 06/30/2022 -LVEF  60%    Continue heart failure ([facility] 08/09/2018    Herpes zoster without complication      Diagnostic studies:   ECG 9/14/22  SR   395 QTcH, 94QRS    Echo 06/30/2022   Summary   Left ventricular systolic function is normal with ejection fraction   estimated at 60 %. Irregular rhythm. Normal left ventricular wall thickness. Diastolic dysfunction grade and filing pressure are indeterminate. The right atrium is mildly dilated. The right ventricle is moderately enlarged. Right ventricular systolic function is moderately reduced . Hypokinetic free wall of the right ventricle. The aortic root is mildly dilated. The ascending aorta is mildly dilated. Aortic valve appears sclerotic but opens adequately. Mild mitral and pulmonic regurgitation. Moderate tricuspid regurgitation. Systolic pulmonary artery pressure (SPAP) estimated at 52 mmHg (right atrial   pressure 8 mmHg), consistent with moderate pulmonary hypertension. KAROLINA 02/18/2019   Summary   Ejection fraction is visually estimated to be 40-45 %. The left atrium is moderately dilated. DASHAWN is removed. I independently reviewed the cardiac diagnostic studies, ECG and relevant imaging studies. Lab Results   Component Value Date    LVEF 60 06/30/2022    LVEFMODE Echo 12/07/2018     Lab Results   Component Value Date    TSHFT4 5.76 (H) 07/01/2020    TSH 3.96 03/13/2018         Physical Examination:  Vitals:    09/14/22 1531   BP: 100/78   Pulse: 80   SpO2: 98%      Wt Readings from Last 3 Encounters:   09/14/22 240 lb (108.9 kg)   07/19/22 244 lb 3.2 oz (110.8 kg)   07/09/22 251 lb 8.7 oz (114.1 kg)       Constitutional: Oriented. No distress. Head: Normocephalic and atraumatic. Mouth/Throat: Oropharynx is clear and moist.   Eyes: Conjunctivae normal. EOM are normal.   Neck: Neck supple. No rigidity. No JVD present. Cardiovascular: Normal rate, regular rhythm, S1&S2. Pulmonary/Chest: Bilateral respiratory sounds. No wheezes, No rhonchi. Abdominal: Soft. Bowel sounds present. No distension, No tenderness. Musculoskeletal: No tenderness. No edema    Lymphadenopathy: Has no cervical adenopathy. Neurological: Alert and oriented. Cranial nerve appears intact, No Gross deficit   Skin: Skin is warm and dry. No rash noted. Psychiatric: Has a normal behavior       Review of System:  [x] Full ROS obtained and negative except as mentioned in HPI    Prior to Admission medications    Medication Sig Start Date End Date Taking? Authorizing Provider   levothyroxine (SYNTHROID) 112 MCG tablet TAKE 1 TABLET DAILY 9/6/22  Yes CARLIE Hernandez CNP   pantoprazole (PROTONIX) 20 MG tablet TAKE 1 TABLET EVERY MORNING BEFORE BREAKFAST 9/6/22  Yes CARLIE Hernandez CNP   furosemide (LASIX) 40 MG tablet TAKE 1 TABLET DAILY 8/22/22  Yes CARLIE Hernandez CNP   lisinopril (PRINIVIL;ZESTRIL) 20 MG tablet Take 0.5 tablets by mouth daily  Patient taking differently: Take 20 mg by mouth daily 7/9/22  Yes Kp Alvarado MD   metoprolol tartrate (LOPRESSOR) 25 MG tablet Take 0.5 tablets by mouth 2 times daily 7/9/22  Yes Kp Alvarado MD   sertraline (ZOLOFT) 25 MG tablet Take 1 tablet by mouth daily 7/10/22  Yes Kp Alvarado MD   amitriptyline (ELAVIL) 25 MG tablet Take 1 tablet by mouth nightly 9/28/21  Yes CARLIE Hernandez CNP   albuterol sulfate HFA (VENTOLIN HFA) 108 (90 Base) MCG/ACT inhaler Inhale 2 puffs into the lungs 4 times daily as needed for Wheezing 9/28/21  Yes CARLIE Hernandez CNP   clonazePAM (KLONOPIN) 1 MG tablet Take 1 tablet by mouth nightly as needed for Anxiety for up to 90 days. 9/28/21 9/14/22 Yes CARLIE Hernandez CNP       Allergies   Allergen Reactions    Benadryl [Diphenhydramine]      Accelerated heart rate       Social History:  Reviewed. reports that she has never smoked. She has never used smokeless tobacco. She reports current alcohol use. She reports that she does not use drugs.      Family History:  Reviewed. Reviewed. No family history of SCD. Relevant and available labs, and cardiovascular diagnostics reviewed. Reviewed. I independently reviewed relevant and available cardiac diagnostic tests ECG, CXR, Echo, Stress test, Device interrogation, Holter, CT scan. Outside medical records via Care everywhere reviewed and summarized in H&P above. Complex medical condition with multiple medical problems affecting prognosis and outcome of EP interventions       - The patient is counseled to follow a low salt diet to assure blood pressure remains controlled for cardiovascular risk factor modification.   - The patient is counseled to avoid excess caffeine, and energy drinks as this may exacerbated ectopy and arrhythmia. - The patient is counseled to get regular exercise 3-5 times per week to control cardiovascular risk factors. - The patient is counseled to lose weigt to control cardiovascular risk factors. All questions and concerns were addressed to the patient/family. Alternatives to my treatment were discussed. I have discussed the above stated plan and the patient verbalized understanding and agreed with the plan. Scribe attestation: This note was scribed in the presence of  Madhuri Hansen MD by Nino Kauffman RN    I, Dr. Madhuri Hansen personally performed the services described in this documentation as scribed by RN in my presence, and it is both accurate and complete. NOTE: This report was transcribed using voice recognition software. Every effort was made to ensure accuracy, however, inadvertent computerized transcription errors may be present.      Madhuri Hansen MD, [facility]   Office: [phone]  Fax: [phone]

## 2022-09-07 NOTE — TELEPHONE ENCOUNTER
Spoke to pt and advised that refill request was approved and to make a appt for further refills, pt voiced understanding

## 2022-09-14 ENCOUNTER — OFFICE VISIT (OUTPATIENT)
Dept: CARDIOLOGY CLINIC | Age: 77
End: 2022-09-14
Payer: MEDICARE

## 2022-09-14 VITALS
HEART RATE: 80 BPM | BODY MASS INDEX: 38.57 KG/M2 | SYSTOLIC BLOOD PRESSURE: 100 MMHG | HEIGHT: 66 IN | OXYGEN SATURATION: 98 % | DIASTOLIC BLOOD PRESSURE: 78 MMHG | WEIGHT: 240 LBS

## 2022-09-14 DIAGNOSIS — E03.9 ACQUIRED HYPOTHYROIDISM: Chronic | ICD-10-CM

## 2022-09-14 DIAGNOSIS — E66.01 MORBID OBESITY DUE TO EXCESS CALORIES (HCC): ICD-10-CM

## 2022-09-14 DIAGNOSIS — I10 BENIGN ESSENTIAL HTN: Chronic | ICD-10-CM

## 2022-09-14 DIAGNOSIS — I48.0 PAROXYSMAL ATRIAL FIBRILLATION (HCC): Primary | ICD-10-CM

## 2022-09-14 DIAGNOSIS — Z91.89 AT RISK FOR SLEEP APNEA: ICD-10-CM

## 2022-09-14 DIAGNOSIS — I51.9 LV DYSFUNCTION: ICD-10-CM

## 2022-09-14 PROCEDURE — 93000 ELECTROCARDIOGRAM COMPLETE: CPT | Performed by: INTERNAL MEDICINE

## 2022-09-14 PROCEDURE — 1123F ACP DISCUSS/DSCN MKR DOCD: CPT | Performed by: INTERNAL MEDICINE

## 2022-09-14 PROCEDURE — 99214 OFFICE O/P EST MOD 30 MIN: CPT | Performed by: INTERNAL MEDICINE

## 2022-10-04 ENCOUNTER — NURSE ONLY (OUTPATIENT)
Dept: CARDIOLOGY CLINIC | Age: 77
End: 2022-10-04

## 2022-10-04 DIAGNOSIS — I48.0 PAROXYSMAL ATRIAL FIBRILLATION (HCC): ICD-10-CM

## 2022-10-11 ENCOUNTER — OFFICE VISIT (OUTPATIENT)
Dept: INTERNAL MEDICINE CLINIC | Age: 77
End: 2022-10-11
Payer: MEDICARE

## 2022-10-11 VITALS
WEIGHT: 242 LBS | HEART RATE: 69 BPM | DIASTOLIC BLOOD PRESSURE: 80 MMHG | SYSTOLIC BLOOD PRESSURE: 140 MMHG | HEIGHT: 66 IN | BODY MASS INDEX: 38.89 KG/M2 | OXYGEN SATURATION: 96 %

## 2022-10-11 DIAGNOSIS — M79.7 FIBROMYALGIA: ICD-10-CM

## 2022-10-11 DIAGNOSIS — K21.9 GASTROESOPHAGEAL REFLUX DISEASE WITHOUT ESOPHAGITIS: ICD-10-CM

## 2022-10-11 DIAGNOSIS — I10 ESSENTIAL HYPERTENSION: ICD-10-CM

## 2022-10-11 DIAGNOSIS — F32.2 SEVERE SINGLE CURRENT EPISODE OF MAJOR DEPRESSIVE DISORDER, WITHOUT PSYCHOTIC FEATURES (HCC): ICD-10-CM

## 2022-10-11 DIAGNOSIS — E03.9 ACQUIRED HYPOTHYROIDISM: ICD-10-CM

## 2022-10-11 DIAGNOSIS — Z00.00 MEDICARE ANNUAL WELLNESS VISIT, SUBSEQUENT: Primary | ICD-10-CM

## 2022-10-11 DIAGNOSIS — I50.42 CHRONIC COMBINED SYSTOLIC (CONGESTIVE) AND DIASTOLIC (CONGESTIVE) HEART FAILURE (HCC): ICD-10-CM

## 2022-10-11 DIAGNOSIS — F41.9 CHRONIC ANXIETY: ICD-10-CM

## 2022-10-11 DIAGNOSIS — Z23 FLU VACCINE NEED: ICD-10-CM

## 2022-10-11 LAB
BASOPHILS ABSOLUTE: 0.1 K/UL (ref 0–0.2)
BASOPHILS RELATIVE PERCENT: 0.9 %
EOSINOPHILS ABSOLUTE: 0.3 K/UL (ref 0–0.6)
EOSINOPHILS RELATIVE PERCENT: 5.1 %
HCT VFR BLD CALC: 44.3 % (ref 36–48)
HEMOGLOBIN: 14.7 G/DL (ref 12–16)
LYMPHOCYTES ABSOLUTE: 1.2 K/UL (ref 1–5.1)
LYMPHOCYTES RELATIVE PERCENT: 19.6 %
MCH RBC QN AUTO: 29.1 PG (ref 26–34)
MCHC RBC AUTO-ENTMCNC: 33.1 G/DL (ref 31–36)
MCV RBC AUTO: 87.8 FL (ref 80–100)
MONOCYTES ABSOLUTE: 0.5 K/UL (ref 0–1.3)
MONOCYTES RELATIVE PERCENT: 8.8 %
NEUTROPHILS ABSOLUTE: 4.1 K/UL (ref 1.7–7.7)
NEUTROPHILS RELATIVE PERCENT: 65.6 %
PDW BLD-RTO: 14.3 % (ref 12.4–15.4)
PLATELET # BLD: 260 K/UL (ref 135–450)
PMV BLD AUTO: 8.3 FL (ref 5–10.5)
RBC # BLD: 5.05 M/UL (ref 4–5.2)
WBC # BLD: 6.3 K/UL (ref 4–11)

## 2022-10-11 PROCEDURE — 90694 VACC AIIV4 NO PRSRV 0.5ML IM: CPT | Performed by: NURSE PRACTITIONER

## 2022-10-11 PROCEDURE — G0439 PPPS, SUBSEQ VISIT: HCPCS | Performed by: NURSE PRACTITIONER

## 2022-10-11 PROCEDURE — 99213 OFFICE O/P EST LOW 20 MIN: CPT | Performed by: NURSE PRACTITIONER

## 2022-10-11 PROCEDURE — 3078F DIAST BP <80 MM HG: CPT | Performed by: NURSE PRACTITIONER

## 2022-10-11 PROCEDURE — G0008 ADMIN INFLUENZA VIRUS VAC: HCPCS | Performed by: NURSE PRACTITIONER

## 2022-10-11 PROCEDURE — 3074F SYST BP LT 130 MM HG: CPT | Performed by: NURSE PRACTITIONER

## 2022-10-11 PROCEDURE — 1123F ACP DISCUSS/DSCN MKR DOCD: CPT | Performed by: NURSE PRACTITIONER

## 2022-10-11 RX ORDER — LEVOTHYROXINE SODIUM 112 UG/1
112 TABLET ORAL DAILY
Qty: 90 TABLET | Refills: 1 | Status: SHIPPED | OUTPATIENT
Start: 2022-10-11

## 2022-10-11 RX ORDER — PANTOPRAZOLE SODIUM 20 MG/1
20 TABLET, DELAYED RELEASE ORAL DAILY
Qty: 90 TABLET | Refills: 1 | Status: SHIPPED | OUTPATIENT
Start: 2022-10-11

## 2022-10-11 RX ORDER — METOPROLOL TARTRATE 50 MG/1
25 TABLET, FILM COATED ORAL 2 TIMES DAILY
Qty: 90 TABLET | Refills: 1 | Status: SHIPPED | OUTPATIENT
Start: 2022-10-11

## 2022-10-11 RX ORDER — POTASSIUM CHLORIDE 20 MEQ/1
20 TABLET, EXTENDED RELEASE ORAL DAILY
Qty: 90 TABLET | Refills: 1 | Status: SHIPPED | OUTPATIENT
Start: 2022-10-11

## 2022-10-11 RX ORDER — AMITRIPTYLINE HYDROCHLORIDE 25 MG/1
25 TABLET, FILM COATED ORAL NIGHTLY
Qty: 90 TABLET | Refills: 1 | Status: SHIPPED | OUTPATIENT
Start: 2022-10-11

## 2022-10-11 RX ORDER — SERTRALINE HYDROCHLORIDE 25 MG/1
25 TABLET, FILM COATED ORAL DAILY
Qty: 90 TABLET | Refills: 1 | Status: SHIPPED | OUTPATIENT
Start: 2022-10-11

## 2022-10-11 RX ORDER — CLONAZEPAM 1 MG/1
1 TABLET ORAL NIGHTLY PRN
Qty: 30 TABLET | Refills: 0 | Status: SHIPPED | OUTPATIENT
Start: 2022-10-11 | End: 2023-01-09

## 2022-10-11 RX ORDER — FUROSEMIDE 40 MG/1
40 TABLET ORAL DAILY
Qty: 90 TABLET | Refills: 1 | Status: SHIPPED | OUTPATIENT
Start: 2022-10-11

## 2022-10-11 RX ORDER — LISINOPRIL 20 MG/1
20 TABLET ORAL DAILY
Qty: 30 TABLET | Refills: 0 | Status: SHIPPED | OUTPATIENT
Start: 2022-10-11

## 2022-10-11 RX ORDER — LISINOPRIL 20 MG/1
20 TABLET ORAL DAILY
Qty: 90 TABLET | Refills: 1 | Status: SHIPPED | OUTPATIENT
Start: 2022-10-11

## 2022-10-11 ASSESSMENT — PATIENT HEALTH QUESTIONNAIRE - PHQ9
1. LITTLE INTEREST OR PLEASURE IN DOING THINGS: 0
5. POOR APPETITE OR OVEREATING: 0
7. TROUBLE CONCENTRATING ON THINGS, SUCH AS READING THE NEWSPAPER OR WATCHING TELEVISION: 0
SUM OF ALL RESPONSES TO PHQ9 QUESTIONS 1 & 2: 0
4. FEELING TIRED OR HAVING LITTLE ENERGY: 0
10. IF YOU CHECKED OFF ANY PROBLEMS, HOW DIFFICULT HAVE THESE PROBLEMS MADE IT FOR YOU TO DO YOUR WORK, TAKE CARE OF THINGS AT HOME, OR GET ALONG WITH OTHER PEOPLE: 0
SUM OF ALL RESPONSES TO PHQ QUESTIONS 1-9: 0
SUM OF ALL RESPONSES TO PHQ QUESTIONS 1-9: 0
9. THOUGHTS THAT YOU WOULD BE BETTER OFF DEAD, OR OF HURTING YOURSELF: 0
8. MOVING OR SPEAKING SO SLOWLY THAT OTHER PEOPLE COULD HAVE NOTICED. OR THE OPPOSITE, BEING SO FIGETY OR RESTLESS THAT YOU HAVE BEEN MOVING AROUND A LOT MORE THAN USUAL: 0
SUM OF ALL RESPONSES TO PHQ QUESTIONS 1-9: 0
2. FEELING DOWN, DEPRESSED OR HOPELESS: 0
3. TROUBLE FALLING OR STAYING ASLEEP: 0
6. FEELING BAD ABOUT YOURSELF - OR THAT YOU ARE A FAILURE OR HAVE LET YOURSELF OR YOUR FAMILY DOWN: 0
SUM OF ALL RESPONSES TO PHQ QUESTIONS 1-9: 0

## 2022-10-11 ASSESSMENT — LIFESTYLE VARIABLES: HOW OFTEN DO YOU HAVE A DRINK CONTAINING ALCOHOL: MONTHLY OR LESS

## 2022-10-12 LAB
A/G RATIO: 1.5 (ref 1.1–2.2)
ALBUMIN SERPL-MCNC: 4.1 G/DL (ref 3.4–5)
ALP BLD-CCNC: 74 U/L (ref 40–129)
ALT SERPL-CCNC: 37 U/L (ref 10–40)
ANION GAP SERPL CALCULATED.3IONS-SCNC: 11 MMOL/L (ref 3–16)
AST SERPL-CCNC: 47 U/L (ref 15–37)
BILIRUB SERPL-MCNC: 0.3 MG/DL (ref 0–1)
BUN BLDV-MCNC: 13 MG/DL (ref 7–20)
CALCIUM SERPL-MCNC: 10 MG/DL (ref 8.3–10.6)
CHLORIDE BLD-SCNC: 99 MMOL/L (ref 99–110)
CO2: 27 MMOL/L (ref 21–32)
CREAT SERPL-MCNC: 1.2 MG/DL (ref 0.6–1.2)
GFR AFRICAN AMERICAN: 52
GFR NON-AFRICAN AMERICAN: 43
GLUCOSE BLD-MCNC: 98 MG/DL (ref 70–99)
POTASSIUM SERPL-SCNC: 5 MMOL/L (ref 3.5–5.1)
SODIUM BLD-SCNC: 137 MMOL/L (ref 136–145)
TOTAL PROTEIN: 6.8 G/DL (ref 6.4–8.2)
TSH SERPL DL<=0.05 MIU/L-ACNC: 1.59 UIU/ML (ref 0.27–4.2)

## 2022-10-30 ASSESSMENT — ENCOUNTER SYMPTOMS
RESPIRATORY NEGATIVE: 1
GASTROINTESTINAL NEGATIVE: 1

## 2022-10-30 NOTE — PROGRESS NOTES
Medicare Annual Wellness Visit    Gloria Nix is here for Medicare AWV    Assessment & Plan     Medicare annual wellness visit, subsequent    Essential hypertension  - Normotensive  - she has met JNC standards.  - Continue current regimen. -     lisinopril (PRINIVIL;ZESTRIL) 20 MG tablet; Take 1 tablet by mouth daily, Disp-90 tablet, R-1Normal  -     Comprehensive Metabolic Panel; Future  -     CBC with Auto Differential; Future  -     potassium chloride (KLOR-CON M) 20 MEQ extended release tablet; Take 1 tablet by mouth daily, Disp-90 tablet, R-1Normal  -     furosemide (LASIX) 40 MG tablet; Take 1 tablet by mouth daily, Disp-90 tablet, R-1Normal  -     lisinopril (PRINIVIL;ZESTRIL) 20 MG tablet; Take 1 tablet by mouth daily, Disp-30 tablet, R-0Normal    Acquired hypothyroidism  - TSH 1.59  - - Chronic, stable  - Continue current regimen  -     TSH; Future  -     levothyroxine (SYNTHROID) 112 MCG tablet; Take 1 tablet by mouth Daily, Disp-90 tablet, R-1Normal    Gastroesophageal reflux disease without esophagitis  - Chronic, stable  - Continue current regimen  -     Comprehensive Metabolic Panel; Future  -     CBC with Auto Differential; Future  -     pantoprazole (PROTONIX) 20 MG tablet; Take 1 tablet by mouth daily, Disp-90 tablet, R-1Normal    Fibromyalgia  - Chronic, stable  - Continue current regimen  -     amitriptyline (ELAVIL) 25 MG tablet; Take 1 tablet by mouth nightly, Disp-90 tablet, R-1Normal    Chronic anxiety  - Chronic, stable  - Continue current regimen  -     sertraline (ZOLOFT) 25 MG tablet; Take 1 tablet by mouth daily, Disp-90 tablet, R-1Normal  -     clonazePAM (KLONOPIN) 1 MG tablet; Take 1 tablet by mouth nightly as needed for Anxiety for up to 90 days. , Disp-30 tablet, R-0Normal    Chronic combined systolic (congestive) and diastolic (congestive) heart failure (HCC)  -     Comprehensive Metabolic Panel;  Future  -     CBC with Auto Differential; Future    Severe single current episode of major depressive disorder, without psychotic features ([facility] [address]  - Chronic, stable  - Continue current regimen  -     sertraline (ZOLOFT) 25 MG tablet; Take 1 tablet by mouth daily, Disp-90 tablet, R-1Normal    Flu vaccine need  -     Influenza, FLUAD, (age 72 y+), IM, Preservative Free, 0.5 mL    Controlled Substance Monitoring:  Acute and Chronic Pain Monitoring:   RX Monitoring 7/1/2020   Attestation -   Periodic Controlled Substance Monitoring Possible medication side effects, risk of tolerance/dependence & alternative treatments discussed. ;No signs of potential drug abuse or diversion identified. Recommendations for Preventive Services Due: see orders and patient instructions/AVS.  Recommended screening schedule for the next 5-10 years is provided to the patient in written form: see Patient Instructions/AVS.     Return for Medicare Annual Wellness Visit in 1 year. Subjective   Review of Systems   Constitutional: Negative. Respiratory: Negative. Cardiovascular: Negative. Gastrointestinal: Negative. Genitourinary: Negative. Musculoskeletal:  Positive for arthralgias and myalgias. Skin: Negative. Neurological: Negative. Psychiatric/Behavioral:  The patient is nervous/anxious. All other systems reviewed and are negative. Patient's complete Health Risk Assessment and screening values have been reviewed and are found in Flowsheets. The following problems were reviewed today and where indicated follow up appointments were made and/or referrals ordered.     Positive Risk Factor Screenings with Interventions:    Fall Risk:  Do you feel unsteady or are you worried about falling? : (!) yes  2 or more falls in past year?: no  Fall with injury in past year?: no   Fall Risk Interventions:    Home safety tips provided            General Health and ACP:  General  In general, how would you say your health is?: Fair  In the past 7 days, have you experienced any of the following: New or Increased Pain, New or Increased Fatigue, Loneliness, Social Isolation, Stress or Anger?: (!) Yes  Select all that apply: (!) New or Increased Pain  Do you get the social and emotional support that you need?: Yes  Do you have a Living Will?: (!) No    Advance Directives       Power of  Living Will ACP-Advance Directive ACP-Power of     Not on File Filed on 09/13/11 Filed Not on File          General Health Risk Interventions:  Stress: Medications    Health Habits/Nutrition:  Physical Activity: Inactive    Days of Exercise per Week: 0 days    Minutes of Exercise per Session: 0 min     Have you lost any weight without trying in the past 3 months?: (!) Yes (post op)  Body mass index: (!) 39.06  Have you seen the dentist within the past year?: (!) No  Health Habits/Nutrition Interventions:  Monitor    Hearing/Vision:  Do you or your family notice any trouble with your hearing that hasn't been managed with hearing aids?: No  Do you have difficulty driving, watching TV, or doing any of your daily activities because of your eyesight?: No  Have you had an eye exam within the past year?: (!) No  No results found. Hearing/Vision Interventions:  Vision concerns:  patient encouraged to make appointment with his/her eye specialist            Objective   Vitals:    10/11/22 1333   BP: (!) 140/80   Pulse: 69   SpO2: 96%   Weight: 242 lb (109.8 kg)   Height: 5' 6\" (1.676 m)      Body mass index is 39.06 kg/m². Gen: NAD  Eyes: no icterus, no conjunctival erythema  CV: RRR, no mrgs  Resp: CTAB  Abd: soft, NTTP  Neuro: alert, oriented, answers questions appropriately  MSK: no peripheral edema  Skin: warm, dry  Psych: Normal mood, affect        Allergies   Allergen Reactions    Benadryl [Diphenhydramine]      Accelerated heart rate     Prior to Visit Medications    Medication Sig Taking?  Authorizing Provider   lisinopril (PRINIVIL;ZESTRIL) 20 MG tablet Take 1 tablet by mouth daily Yes Nida Maravilla, APRN - CNP potassium chloride (KLOR-CON M) 20 MEQ extended release tablet Take 1 tablet by mouth daily Yes CARLIE Salgado CNP   levothyroxine (SYNTHROID) 112 MCG tablet Take 1 tablet by mouth Daily Yes CARLIE Salgado CNP   pantoprazole (PROTONIX) 20 MG tablet Take 1 tablet by mouth daily Yes CARLIE Salgado CNP   furosemide (LASIX) 40 MG tablet Take 1 tablet by mouth daily Yes CARLIE Salgado CNP   metoprolol tartrate (LOPRESSOR) 50 MG tablet Take 0.5 tablets by mouth 2 times daily Yes CARLIE Salgado CNP   sertraline (ZOLOFT) 25 MG tablet Take 1 tablet by mouth daily Yes CARLIE Salgado CNP   amitriptyline (ELAVIL) 25 MG tablet Take 1 tablet by mouth nightly Yes CARLIE Salgado CNP   clonazePAM (KLONOPIN) 1 MG tablet Take 1 tablet by mouth nightly as needed for Anxiety for up to 90 days.  Yes CARLIE Salgado CNP   lisinopril (PRINIVIL;ZESTRIL) 20 MG tablet Take 1 tablet by mouth daily Yes CARLIE Salgado CNP   albuterol sulfate HFA (VENTOLIN HFA) 108 (90 Base) MCG/ACT inhaler Inhale 2 puffs into the lungs 4 times daily as needed for Wheezing Yes CARLIE Salgado CNP       CareTeam (Including outside providers/suppliers regularly involved in providing care):   Patient Care Team:  CARLIE Salgado CNP as PCP - General (Family Nurse Practitioner)  CARLIE Salgado CNP as PCP - [facility]  Juan Dang MD as Consulting Physician (Cardiology)     Reviewed and updated this visit:  Allergies  Meds

## 2022-10-30 NOTE — PATIENT INSTRUCTIONS
Personalized Preventive Plan for Shelton Armando - 10/11/2022  Medicare offers a range of preventive health benefits. Some of the tests and screenings are paid in full while other may be subject to a deductible, co-insurance, and/or copay. Some of these benefits include a comprehensive review of your medical history including lifestyle, illnesses that may run in your family, and various assessments and screenings as appropriate. After reviewing your medical record and screening and assessments performed today your provider may have ordered immunizations, labs, imaging, and/or referrals for you. A list of these orders (if applicable) as well as your Preventive Care list are included within your After Visit Summary for your review. Other Preventive Recommendations:    A preventive eye exam performed by an eye specialist is recommended every 1-2 years to screen for glaucoma; cataracts, macular degeneration, and other eye disorders. A preventive dental visit is recommended every 6 months. Try to get at least 150 minutes of exercise per week or 10,000 steps per day on a pedometer . Order or download the FREE \"Exercise & Physical Activity: Your Everyday Guide\" from [employer]. Call 8-121.219.7812 or search [employer] online. You need 4571-1371 mg of calcium and 4269-2352 IU of vitamin D per day. It is possible to meet your calcium requirement with diet alone, but a vitamin D supplement is usually necessary to meet this goal.  When exposed to the sun, use a sunscreen that protects against both UVA and UVB radiation with an SPF of 30 or greater. Reapply every 2 to 3 hours or after sweating, drying off with a towel, or swimming. Always wear a seat belt when traveling in a car. Always wear a helmet when riding a bicycle or motorcycle.

## 2022-11-25 DIAGNOSIS — M79.7 FIBROMYALGIA: ICD-10-CM

## 2022-11-25 RX ORDER — AMITRIPTYLINE HYDROCHLORIDE 25 MG/1
TABLET, FILM COATED ORAL
Qty: 90 TABLET | Refills: 3 | OUTPATIENT
Start: 2022-11-25

## 2022-12-05 DIAGNOSIS — K21.9 GASTROESOPHAGEAL REFLUX DISEASE WITHOUT ESOPHAGITIS: ICD-10-CM

## 2022-12-05 RX ORDER — PANTOPRAZOLE SODIUM 20 MG/1
TABLET, DELAYED RELEASE ORAL
Qty: 90 TABLET | Refills: 3 | OUTPATIENT
Start: 2022-12-05

## 2023-04-04 DIAGNOSIS — I10 ESSENTIAL HYPERTENSION: ICD-10-CM

## 2023-04-04 RX ORDER — LISINOPRIL 20 MG/1
TABLET ORAL
Qty: 90 TABLET | Refills: 1 | Status: SHIPPED | OUTPATIENT
Start: 2023-04-04

## 2023-04-04 NOTE — TELEPHONE ENCOUNTER
Future Appointments    Encounter Information    Provider Department Appt Notes   4/11/2023 CARLIE Parker - [address] Internal Medicine 6 months     Past Visits    Date Provider Specialty Visit Type Primary Dx   10/11/2022 CARLIE Parker - CNP Internal Medicine Office Visit Medicare annual wellness visit, subsequent

## 2023-04-11 ENCOUNTER — OFFICE VISIT (OUTPATIENT)
Dept: INTERNAL MEDICINE CLINIC | Age: 78
End: 2023-04-11
Payer: MEDICARE

## 2023-04-11 VITALS
HEART RATE: 66 BPM | HEIGHT: 66 IN | DIASTOLIC BLOOD PRESSURE: 70 MMHG | WEIGHT: 245 LBS | BODY MASS INDEX: 39.37 KG/M2 | OXYGEN SATURATION: 97 % | SYSTOLIC BLOOD PRESSURE: 100 MMHG

## 2023-04-11 DIAGNOSIS — F41.9 CHRONIC ANXIETY: ICD-10-CM

## 2023-04-11 DIAGNOSIS — M79.89 LEG SWELLING: ICD-10-CM

## 2023-04-11 DIAGNOSIS — E03.9 ACQUIRED HYPOTHYROIDISM: ICD-10-CM

## 2023-04-11 DIAGNOSIS — R06.02 SHORTNESS OF BREATH: ICD-10-CM

## 2023-04-11 DIAGNOSIS — R10.32 LEFT LOWER QUADRANT PAIN: ICD-10-CM

## 2023-04-11 DIAGNOSIS — K57.92 DIVERTICULITIS: Primary | ICD-10-CM

## 2023-04-11 DIAGNOSIS — I50.42 CHRONIC COMBINED SYSTOLIC (CONGESTIVE) AND DIASTOLIC (CONGESTIVE) HEART FAILURE (HCC): ICD-10-CM

## 2023-04-11 DIAGNOSIS — M79.7 FIBROMYALGIA: ICD-10-CM

## 2023-04-11 DIAGNOSIS — K21.9 GASTROESOPHAGEAL REFLUX DISEASE WITHOUT ESOPHAGITIS: ICD-10-CM

## 2023-04-11 DIAGNOSIS — N28.9 RENAL LESION: ICD-10-CM

## 2023-04-11 DIAGNOSIS — F32.2 SEVERE SINGLE CURRENT EPISODE OF MAJOR DEPRESSIVE DISORDER, WITHOUT PSYCHOTIC FEATURES (HCC): ICD-10-CM

## 2023-04-11 DIAGNOSIS — I10 ESSENTIAL HYPERTENSION: ICD-10-CM

## 2023-04-11 DIAGNOSIS — K57.92 DIVERTICULITIS: ICD-10-CM

## 2023-04-11 LAB
BASOPHILS # BLD: 0 K/UL (ref 0–0.2)
BASOPHILS NFR BLD: 0.6 %
DEPRECATED RDW RBC AUTO: 14.3 % (ref 12.4–15.4)
EOSINOPHIL # BLD: 0.3 K/UL (ref 0–0.6)
EOSINOPHIL NFR BLD: 5 %
HCT VFR BLD AUTO: 43.8 % (ref 36–48)
HGB BLD-MCNC: 14.4 G/DL (ref 12–16)
LYMPHOCYTES # BLD: 1.4 K/UL (ref 1–5.1)
LYMPHOCYTES NFR BLD: 21.7 %
MCH RBC QN AUTO: 28.7 PG (ref 26–34)
MCHC RBC AUTO-ENTMCNC: 32.9 G/DL (ref 31–36)
MCV RBC AUTO: 87.2 FL (ref 80–100)
MONOCYTES # BLD: 0.6 K/UL (ref 0–1.3)
MONOCYTES NFR BLD: 9.1 %
NEUTROPHILS # BLD: 4.1 K/UL (ref 1.7–7.7)
NEUTROPHILS NFR BLD: 63.6 %
PLATELET # BLD AUTO: 252 K/UL (ref 135–450)
PMV BLD AUTO: 9.2 FL (ref 5–10.5)
RBC # BLD AUTO: 5.02 M/UL (ref 4–5.2)
WBC # BLD AUTO: 6.5 K/UL (ref 4–11)

## 2023-04-11 PROCEDURE — 1036F TOBACCO NON-USER: CPT | Performed by: NURSE PRACTITIONER

## 2023-04-11 PROCEDURE — G8400 PT W/DXA NO RESULTS DOC: HCPCS | Performed by: NURSE PRACTITIONER

## 2023-04-11 PROCEDURE — 3078F DIAST BP <80 MM HG: CPT | Performed by: NURSE PRACTITIONER

## 2023-04-11 PROCEDURE — 3074F SYST BP LT 130 MM HG: CPT | Performed by: NURSE PRACTITIONER

## 2023-04-11 PROCEDURE — G8427 DOCREV CUR MEDS BY ELIG CLIN: HCPCS | Performed by: NURSE PRACTITIONER

## 2023-04-11 PROCEDURE — 99215 OFFICE O/P EST HI 40 MIN: CPT | Performed by: NURSE PRACTITIONER

## 2023-04-11 PROCEDURE — 1090F PRES/ABSN URINE INCON ASSESS: CPT | Performed by: NURSE PRACTITIONER

## 2023-04-11 PROCEDURE — 1123F ACP DISCUSS/DSCN MKR DOCD: CPT | Performed by: NURSE PRACTITIONER

## 2023-04-11 PROCEDURE — G8417 CALC BMI ABV UP PARAM F/U: HCPCS | Performed by: NURSE PRACTITIONER

## 2023-04-11 RX ORDER — PANTOPRAZOLE SODIUM 20 MG/1
20 TABLET, DELAYED RELEASE ORAL DAILY
Qty: 90 TABLET | Refills: 1 | Status: SHIPPED | OUTPATIENT
Start: 2023-04-11

## 2023-04-11 RX ORDER — FUROSEMIDE 40 MG/1
40 TABLET ORAL DAILY
Qty: 90 TABLET | Refills: 1 | Status: SHIPPED | OUTPATIENT
Start: 2023-04-11

## 2023-04-11 RX ORDER — AMITRIPTYLINE HYDROCHLORIDE 25 MG/1
25 TABLET, FILM COATED ORAL NIGHTLY
Qty: 90 TABLET | Refills: 1 | Status: SHIPPED | OUTPATIENT
Start: 2023-04-11

## 2023-04-11 RX ORDER — AMOXICILLIN AND CLAVULANATE POTASSIUM 875; 125 MG/1; MG/1
1 TABLET, FILM COATED ORAL 2 TIMES DAILY
Qty: 20 TABLET | Refills: 0 | Status: SHIPPED | OUTPATIENT
Start: 2023-04-11 | End: 2023-04-21

## 2023-04-11 RX ORDER — LEVOTHYROXINE SODIUM 112 UG/1
112 TABLET ORAL DAILY
Qty: 90 TABLET | Refills: 1 | Status: SHIPPED | OUTPATIENT
Start: 2023-04-11

## 2023-04-11 RX ORDER — SERTRALINE HYDROCHLORIDE 25 MG/1
25 TABLET, FILM COATED ORAL DAILY
Qty: 90 TABLET | Refills: 1 | Status: SHIPPED | OUTPATIENT
Start: 2023-04-11

## 2023-04-11 RX ORDER — CLONAZEPAM 1 MG/1
1 TABLET ORAL NIGHTLY PRN
Qty: 30 TABLET | Refills: 0 | Status: SHIPPED | OUTPATIENT
Start: 2023-04-11 | End: 2023-07-10

## 2023-04-11 RX ORDER — LISINOPRIL 20 MG/1
20 TABLET ORAL DAILY
Qty: 90 TABLET | Refills: 1 | Status: SHIPPED | OUTPATIENT
Start: 2023-04-11

## 2023-04-11 RX ORDER — METOPROLOL TARTRATE 50 MG/1
25 TABLET, FILM COATED ORAL 2 TIMES DAILY
Qty: 90 TABLET | Refills: 1 | Status: SHIPPED | OUTPATIENT
Start: 2023-04-11

## 2023-04-11 SDOH — ECONOMIC STABILITY: INCOME INSECURITY: HOW HARD IS IT FOR YOU TO PAY FOR THE VERY BASICS LIKE FOOD, HOUSING, MEDICAL CARE, AND HEATING?: NOT HARD AT ALL

## 2023-04-11 SDOH — ECONOMIC STABILITY: FOOD INSECURITY: WITHIN THE PAST 12 MONTHS, YOU WORRIED THAT YOUR FOOD WOULD RUN OUT BEFORE YOU GOT MONEY TO BUY MORE.: NEVER TRUE

## 2023-04-11 SDOH — ECONOMIC STABILITY: HOUSING INSECURITY
IN THE LAST 12 MONTHS, WAS THERE A TIME WHEN YOU DID NOT HAVE A STEADY PLACE TO SLEEP OR SLEPT IN A SHELTER (INCLUDING NOW)?: NO

## 2023-04-11 SDOH — ECONOMIC STABILITY: FOOD INSECURITY: WITHIN THE PAST 12 MONTHS, THE FOOD YOU BOUGHT JUST DIDN'T LAST AND YOU DIDN'T HAVE MONEY TO GET MORE.: NEVER TRUE

## 2023-04-11 ASSESSMENT — PATIENT HEALTH QUESTIONNAIRE - PHQ9
SUM OF ALL RESPONSES TO PHQ QUESTIONS 1-9: 0
9. THOUGHTS THAT YOU WOULD BE BETTER OFF DEAD, OR OF HURTING YOURSELF: 0
SUM OF ALL RESPONSES TO PHQ QUESTIONS 1-9: 0
2. FEELING DOWN, DEPRESSED OR HOPELESS: 0
6. FEELING BAD ABOUT YOURSELF - OR THAT YOU ARE A FAILURE OR HAVE LET YOURSELF OR YOUR FAMILY DOWN: 0
SUM OF ALL RESPONSES TO PHQ9 QUESTIONS 1 & 2: 0
8. MOVING OR SPEAKING SO SLOWLY THAT OTHER PEOPLE COULD HAVE NOTICED. OR THE OPPOSITE, BEING SO FIGETY OR RESTLESS THAT YOU HAVE BEEN MOVING AROUND A LOT MORE THAN USUAL: 0
3. TROUBLE FALLING OR STAYING ASLEEP: 0
4. FEELING TIRED OR HAVING LITTLE ENERGY: 0
10. IF YOU CHECKED OFF ANY PROBLEMS, HOW DIFFICULT HAVE THESE PROBLEMS MADE IT FOR YOU TO DO YOUR WORK, TAKE CARE OF THINGS AT HOME, OR GET ALONG WITH OTHER PEOPLE: 0
SUM OF ALL RESPONSES TO PHQ QUESTIONS 1-9: 0
SUM OF ALL RESPONSES TO PHQ QUESTIONS 1-9: 0
1. LITTLE INTEREST OR PLEASURE IN DOING THINGS: 0
5. POOR APPETITE OR OVEREATING: 0
7. TROUBLE CONCENTRATING ON THINGS, SUCH AS READING THE NEWSPAPER OR WATCHING TELEVISION: 0

## 2023-04-12 LAB
ALBUMIN SERPL-MCNC: 4.6 G/DL (ref 3.4–5)
ALBUMIN/GLOB SERPL: 2 {RATIO} (ref 1.1–2.2)
ALP SERPL-CCNC: 80 U/L (ref 40–129)
ALT SERPL-CCNC: 43 U/L (ref 10–40)
ANION GAP SERPL CALCULATED.3IONS-SCNC: 10 MMOL/L (ref 3–16)
AST SERPL-CCNC: 54 U/L (ref 15–37)
BILIRUB SERPL-MCNC: 0.4 MG/DL (ref 0–1)
BUN SERPL-MCNC: 16 MG/DL (ref 7–20)
CALCIUM SERPL-MCNC: 9.9 MG/DL (ref 8.3–10.6)
CHLORIDE SERPL-SCNC: 101 MMOL/L (ref 99–110)
CO2 SERPL-SCNC: 28 MMOL/L (ref 21–32)
CREAT SERPL-MCNC: 1.2 MG/DL (ref 0.6–1.2)
GFR SERPLBLD CREATININE-BSD FMLA CKD-EPI: 46 ML/MIN/{1.73_M2}
GLUCOSE SERPL-MCNC: 108 MG/DL (ref 70–99)
NT-PROBNP SERPL-MCNC: 376 PG/ML (ref 0–449)
POTASSIUM SERPL-SCNC: 5.2 MMOL/L (ref 3.5–5.1)
PROT SERPL-MCNC: 6.9 G/DL (ref 6.4–8.2)
SODIUM SERPL-SCNC: 139 MMOL/L (ref 136–145)
TSH SERPL DL<=0.005 MIU/L-ACNC: 2.11 UIU/ML (ref 0.27–4.2)

## 2023-04-18 ASSESSMENT — ENCOUNTER SYMPTOMS
CONSTIPATION: 0
BLOOD IN STOOL: 0
VOMITING: 0
SHORTNESS OF BREATH: 1
DIARRHEA: 0
ABDOMINAL PAIN: 1
NAUSEA: 0

## 2023-05-09 ENCOUNTER — HOSPITAL ENCOUNTER (OUTPATIENT)
Dept: CT IMAGING | Age: 78
Discharge: HOME | End: 2023-05-09
Payer: MEDICARE

## 2023-05-09 DIAGNOSIS — N28.9 RENAL LESION: ICD-10-CM

## 2023-05-09 DIAGNOSIS — R10.32 LEFT LOWER QUADRANT PAIN: ICD-10-CM

## 2023-05-09 DIAGNOSIS — K57.92 DIVERTICULITIS: ICD-10-CM

## 2023-05-09 PROCEDURE — 74177 CT ABD & PELVIS W/CONTRAST: CPT

## 2023-05-09 PROCEDURE — 6360000004 HC RX CONTRAST MEDICATION: Performed by: NURSE PRACTITIONER

## 2023-05-09 RX ADMIN — IOHEXOL 50 ML: 240 INJECTION, SOLUTION INTRATHECAL; INTRAVASCULAR; INTRAVENOUS; ORAL at 12:29

## 2023-05-09 RX ADMIN — IOPAMIDOL 75 ML: 755 INJECTION, SOLUTION INTRAVENOUS at 12:29

## 2023-05-11 DIAGNOSIS — N28.89 LEFT RENAL MASS: Primary | ICD-10-CM

## 2023-06-23 ENCOUNTER — OFFICE VISIT (OUTPATIENT)
Dept: ORTHOPEDIC SURGERY | Age: 78
End: 2023-06-23

## 2023-06-23 VITALS — BODY MASS INDEX: 39.21 KG/M2 | HEIGHT: 66 IN | WEIGHT: 244 LBS

## 2023-06-23 DIAGNOSIS — M17.12 PRIMARY OSTEOARTHRITIS OF LEFT KNEE: Primary | ICD-10-CM

## 2023-06-23 DIAGNOSIS — E66.9 OBESITY (BMI 30-39.9): ICD-10-CM

## 2023-06-23 RX ORDER — LIDOCAINE HYDROCHLORIDE 10 MG/ML
1 INJECTION, SOLUTION EPIDURAL; INFILTRATION; INTRACAUDAL; PERINEURAL ONCE
Status: COMPLETED | OUTPATIENT
Start: 2023-06-23 | End: 2023-06-23

## 2023-06-23 RX ORDER — BUPIVACAINE HYDROCHLORIDE 2.5 MG/ML
1 INJECTION, SOLUTION INFILTRATION; PERINEURAL ONCE
Status: COMPLETED | OUTPATIENT
Start: 2023-06-23 | End: 2023-06-23

## 2023-06-23 RX ORDER — TRIAMCINOLONE ACETONIDE 40 MG/ML
40 INJECTION, SUSPENSION INTRA-ARTICULAR; INTRAMUSCULAR ONCE
Status: COMPLETED | OUTPATIENT
Start: 2023-06-23 | End: 2023-06-23

## 2023-06-23 RX ADMIN — BUPIVACAINE HYDROCHLORIDE 2.5 MG: 2.5 INJECTION, SOLUTION INFILTRATION; PERINEURAL at 11:28

## 2023-06-23 RX ADMIN — LIDOCAINE HYDROCHLORIDE 1 ML: 10 INJECTION, SOLUTION EPIDURAL; INFILTRATION; INTRACAUDAL; PERINEURAL at 11:29

## 2023-06-23 RX ADMIN — TRIAMCINOLONE ACETONIDE 40 MG: 40 INJECTION, SUSPENSION INTRA-ARTICULAR; INTRAMUSCULAR at 11:29

## 2023-06-23 NOTE — PROGRESS NOTES
ORTHOPAEDIC NEW PATIENT NOTE    Chief Complaint   Patient presents with    New Patient     NP Lt Knee       HPI  6/23/23  77 y.o. female seen for evaluation of left knee pain:  Onset 3 weeks  Injury/trauma none  History of symptoms pain started after she was mowing her lawn, no issues prior to 3 weeks ago  Pain is located over medial aspect  Described as aching, sometimes sharp  Worse with any weightbearing activities  Has had to use a cane since it first started because of the pain  Better with Medrol Dosepak, but only temporary relief  Has also tried Tylenol with minimal relief  Numbness and/or tingling = none      Review of Systems  Constitutional - denies fevers, weight loss  Cardiovascular - denies chest pain, palpitations, peripheral edema, blood clots  Respiratory - denies SOB, cough  Gastrointestinal - denies abdominal pain, nausea, vomiting  Genitourinary - denies dysuria, discharge  Musculoskeletal - per HPI  Integumentary - denies rash, sores  Neurologic - denies numbness, tingling, paresthesias  Hematologic - denies abnormal bleeding, blood clots  Allergic/Immunologic - denies metal allergies, recurrent infections    Allergies   Allergen Reactions    Benadryl [Diphenhydramine]      Accelerated heart rate        Current Outpatient Medications   Medication Sig Dispense Refill    lisinopril (PRINIVIL;ZESTRIL) 20 MG tablet Take 1 tablet by mouth daily 90 tablet 1    levothyroxine (SYNTHROID) 112 MCG tablet Take 1 tablet by mouth daily 90 tablet 1    pantoprazole (PROTONIX) 20 MG tablet Take 1 tablet by mouth daily 90 tablet 1    furosemide (LASIX) 40 MG tablet Take 1 tablet by mouth daily 90 tablet 1    metoprolol tartrate (LOPRESSOR) 50 MG tablet Take 0.5 tablets by mouth 2 times daily 90 tablet 1    sertraline (ZOLOFT) 25 MG tablet Take 1 tablet by mouth daily 90 tablet 1    amitriptyline (ELAVIL) 25 MG tablet Take 1 tablet by mouth nightly 90 tablet 1    clonazePAM (KLONOPIN) 1 MG tablet Take 1 tablet by

## 2023-07-11 ENCOUNTER — OFFICE VISIT (OUTPATIENT)
Dept: INTERNAL MEDICINE CLINIC | Age: 78
End: 2023-07-11
Payer: MEDICARE

## 2023-07-11 VITALS
SYSTOLIC BLOOD PRESSURE: 110 MMHG | WEIGHT: 245.6 LBS | HEIGHT: 66 IN | BODY MASS INDEX: 39.47 KG/M2 | OXYGEN SATURATION: 94 % | DIASTOLIC BLOOD PRESSURE: 80 MMHG | HEART RATE: 68 BPM

## 2023-07-11 DIAGNOSIS — M70.52 PES ANSERINUS BURSITIS OF LEFT KNEE: Primary | ICD-10-CM

## 2023-07-11 DIAGNOSIS — L30.9 DERMATITIS: ICD-10-CM

## 2023-07-11 DIAGNOSIS — I10 BENIGN ESSENTIAL HTN: Chronic | ICD-10-CM

## 2023-07-11 PROCEDURE — G8400 PT W/DXA NO RESULTS DOC: HCPCS | Performed by: INTERNAL MEDICINE

## 2023-07-11 PROCEDURE — 99214 OFFICE O/P EST MOD 30 MIN: CPT | Performed by: INTERNAL MEDICINE

## 2023-07-11 PROCEDURE — 1090F PRES/ABSN URINE INCON ASSESS: CPT | Performed by: INTERNAL MEDICINE

## 2023-07-11 PROCEDURE — 3074F SYST BP LT 130 MM HG: CPT | Performed by: INTERNAL MEDICINE

## 2023-07-11 PROCEDURE — 1123F ACP DISCUSS/DSCN MKR DOCD: CPT | Performed by: INTERNAL MEDICINE

## 2023-07-11 PROCEDURE — G8417 CALC BMI ABV UP PARAM F/U: HCPCS | Performed by: INTERNAL MEDICINE

## 2023-07-11 PROCEDURE — 1036F TOBACCO NON-USER: CPT | Performed by: INTERNAL MEDICINE

## 2023-07-11 PROCEDURE — 3079F DIAST BP 80-89 MM HG: CPT | Performed by: INTERNAL MEDICINE

## 2023-07-11 PROCEDURE — 20610 DRAIN/INJ JOINT/BURSA W/O US: CPT | Performed by: INTERNAL MEDICINE

## 2023-07-11 PROCEDURE — G8427 DOCREV CUR MEDS BY ELIG CLIN: HCPCS | Performed by: INTERNAL MEDICINE

## 2023-07-11 RX ORDER — PREDNISONE 10 MG/1
10 TABLET ORAL 2 TIMES DAILY
Qty: 10 TABLET | Refills: 0 | Status: SHIPPED | OUTPATIENT
Start: 2023-07-11 | End: 2023-07-16

## 2023-07-11 RX ORDER — TRIAMCINOLONE ACETONIDE 1 MG/G
CREAM TOPICAL
Qty: 45 G | Refills: 1 | Status: SHIPPED | OUTPATIENT
Start: 2023-07-11

## 2023-07-11 RX ORDER — METHYLPREDNISOLONE ACETATE 40 MG/ML
40 INJECTION, SUSPENSION INTRA-ARTICULAR; INTRALESIONAL; INTRAMUSCULAR; SOFT TISSUE ONCE
Status: COMPLETED | OUTPATIENT
Start: 2023-07-11 | End: 2023-07-11

## 2023-07-11 RX ADMIN — METHYLPREDNISOLONE ACETATE 40 MG: 40 INJECTION, SUSPENSION INTRA-ARTICULAR; INTRALESIONAL; INTRAMUSCULAR; SOFT TISSUE at 16:13

## 2023-07-11 ASSESSMENT — ENCOUNTER SYMPTOMS
BLURRED VISION: 0
SHORTNESS OF BREATH: 0

## 2023-07-11 NOTE — PROGRESS NOTES
Exam  Vitals and nursing note reviewed. Musculoskeletal:      Right knee: Erythema present. Left knee: Tenderness present. Legs: This dictation was generated by voice recognition computer software. Although all attempts are made to edit the dictation for accuracy, there may be errors in the transcription that are not intended. An electronic signature was used to authenticate this note.     --Rodolfo Quarles MD

## 2023-09-10 DIAGNOSIS — I10 ESSENTIAL HYPERTENSION: ICD-10-CM

## 2023-09-10 DIAGNOSIS — F41.9 CHRONIC ANXIETY: ICD-10-CM

## 2023-09-10 DIAGNOSIS — F32.2 SEVERE SINGLE CURRENT EPISODE OF MAJOR DEPRESSIVE DISORDER, WITHOUT PSYCHOTIC FEATURES (HCC): ICD-10-CM

## 2023-09-10 DIAGNOSIS — E03.9 ACQUIRED HYPOTHYROIDISM: ICD-10-CM

## 2023-09-10 DIAGNOSIS — M79.7 FIBROMYALGIA: ICD-10-CM

## 2023-09-11 RX ORDER — LEVOTHYROXINE SODIUM 112 UG/1
112 TABLET ORAL DAILY
Qty: 90 TABLET | Refills: 0 | Status: SHIPPED | OUTPATIENT
Start: 2023-09-11 | End: 2023-10-13 | Stop reason: SDUPTHER

## 2023-09-11 RX ORDER — AMITRIPTYLINE HYDROCHLORIDE 25 MG/1
25 TABLET, FILM COATED ORAL
Qty: 90 TABLET | Refills: 0 | Status: SHIPPED | OUTPATIENT
Start: 2023-09-11

## 2023-09-11 RX ORDER — SERTRALINE HYDROCHLORIDE 25 MG/1
25 TABLET, FILM COATED ORAL DAILY
Qty: 90 TABLET | Refills: 0 | Status: SHIPPED | OUTPATIENT
Start: 2023-09-11

## 2023-09-11 RX ORDER — METOPROLOL TARTRATE 50 MG/1
TABLET, FILM COATED ORAL
Qty: 90 TABLET | Refills: 0 | Status: SHIPPED | OUTPATIENT
Start: 2023-09-11

## 2023-09-11 RX ORDER — FUROSEMIDE 40 MG/1
40 TABLET ORAL DAILY
Qty: 90 TABLET | Refills: 0 | Status: SHIPPED | OUTPATIENT
Start: 2023-09-11

## 2023-09-26 ENCOUNTER — OFFICE VISIT (OUTPATIENT)
Dept: INTERNAL MEDICINE CLINIC | Age: 78
End: 2023-09-26
Payer: MEDICARE

## 2023-09-26 VITALS
SYSTOLIC BLOOD PRESSURE: 106 MMHG | WEIGHT: 246 LBS | BODY MASS INDEX: 39.53 KG/M2 | HEART RATE: 66 BPM | HEIGHT: 66 IN | DIASTOLIC BLOOD PRESSURE: 74 MMHG | OXYGEN SATURATION: 97 %

## 2023-09-26 DIAGNOSIS — G89.29 CHRONIC PAIN OF LEFT KNEE: ICD-10-CM

## 2023-09-26 DIAGNOSIS — N28.89 LEFT RENAL MASS: ICD-10-CM

## 2023-09-26 DIAGNOSIS — I10 ESSENTIAL HYPERTENSION: ICD-10-CM

## 2023-09-26 DIAGNOSIS — M25.562 CHRONIC PAIN OF LEFT KNEE: ICD-10-CM

## 2023-09-26 DIAGNOSIS — Z01.818 PREOP EXAMINATION: Primary | ICD-10-CM

## 2023-09-26 PROCEDURE — G8417 CALC BMI ABV UP PARAM F/U: HCPCS | Performed by: NURSE PRACTITIONER

## 2023-09-26 PROCEDURE — 1124F ACP DISCUSS-NO DSCNMKR DOCD: CPT | Performed by: NURSE PRACTITIONER

## 2023-09-26 PROCEDURE — G8400 PT W/DXA NO RESULTS DOC: HCPCS | Performed by: NURSE PRACTITIONER

## 2023-09-26 PROCEDURE — 1090F PRES/ABSN URINE INCON ASSESS: CPT | Performed by: NURSE PRACTITIONER

## 2023-09-26 PROCEDURE — 3074F SYST BP LT 130 MM HG: CPT | Performed by: NURSE PRACTITIONER

## 2023-09-26 PROCEDURE — 1036F TOBACCO NON-USER: CPT | Performed by: NURSE PRACTITIONER

## 2023-09-26 PROCEDURE — 3078F DIAST BP <80 MM HG: CPT | Performed by: NURSE PRACTITIONER

## 2023-09-26 PROCEDURE — G8427 DOCREV CUR MEDS BY ELIG CLIN: HCPCS | Performed by: NURSE PRACTITIONER

## 2023-09-26 PROCEDURE — 99214 OFFICE O/P EST MOD 30 MIN: CPT | Performed by: NURSE PRACTITIONER

## 2023-09-26 RX ORDER — LISINOPRIL 20 MG/1
20 TABLET ORAL DAILY
Qty: 90 TABLET | Refills: 1 | Status: SHIPPED | OUTPATIENT
Start: 2023-09-26

## 2023-09-26 RX ORDER — POTASSIUM CHLORIDE 20 MEQ/1
20 TABLET, EXTENDED RELEASE ORAL DAILY PRN
Qty: 90 TABLET | Refills: 1 | Status: SHIPPED | OUTPATIENT
Start: 2023-09-26

## 2023-09-26 RX ORDER — ALBUTEROL SULFATE 90 UG/1
2 AEROSOL, METERED RESPIRATORY (INHALATION) 4 TIMES DAILY PRN
Qty: 3 EACH | Refills: 3 | Status: SHIPPED | OUTPATIENT
Start: 2023-09-26

## 2023-09-26 NOTE — PROGRESS NOTES
Preoperative Consultation      Aurea Reyez  YOB: 1945    Date of Service:  9/26/2023    This patient presents today at the request of the surgeon for a preoperative consultation. Surgeon: Dr. Priscilla Mchugh  Indication for surgery: Left renal mass  Scheduled procedure: Left renal mass  Date of surgery: 10/2/23  [facility] of surgery: [facility]  Indicated/required preoperative testing: H&P  Smoker: No  Previous anesthesia complications: No    Family history of anesthesia complications: No  Loose, capped or false teeth: Yes - upper full plate  Recent chest pain or SOB: No  Known Bleeding Risk: No recent or remote history of abnormal bleeding  Personal or FH of DVT/PE: Yes (leg, remote)  Patient objection to receiving blood products: No    Allergies   Allergen Reactions    Benadryl [Diphenhydramine]      Accelerated heart rate       Current Outpatient Medications   Medication Sig Dispense Refill    furosemide (LASIX) 40 MG tablet TAKE 1 TABLET BY MOUTH DAILY (Patient taking differently: Take 1 tablet by mouth as needed For swelling) 90 tablet 0    levothyroxine (SYNTHROID) 112 MCG tablet TAKE 1 TABLET BY MOUTH DAILY 90 tablet 0    metoprolol tartrate (LOPRESSOR) 50 MG tablet TAKE ONE-HALF TABLET BY MOUTH  TWICE DAILY (Patient taking differently: Always takes morning dose- nightly dose as needed for heart rate) 90 tablet 0    sertraline (ZOLOFT) 25 MG tablet TAKE 1 TABLET BY MOUTH DAILY (Patient taking differently: Take 2 tablets by mouth daily) 90 tablet 0    amitriptyline (ELAVIL) 25 MG tablet TAKE 1 TABLET BY MOUTH AT NIGHT (Patient taking differently: Take 0.5 tablets by mouth Half tablet) 90 tablet 0    triamcinolone (KENALOG) 0.1 % cream Apply topically 2 times daily.  (Patient taking differently: as needed Apply topically 2 times daily.) 45 g 1    lisinopril (PRINIVIL;ZESTRIL) 20 MG tablet Take 1 tablet by mouth daily 90 tablet 1    pantoprazole (PROTONIX) 20 MG tablet Take 1 tablet by mouth daily

## 2023-10-02 ENCOUNTER — ANESTHESIA EVENT (OUTPATIENT)
Dept: CT IMAGING | Age: 78
End: 2023-10-02
Payer: MEDICARE

## 2023-10-02 ENCOUNTER — HOSPITAL ENCOUNTER (OUTPATIENT)
Dept: CT IMAGING | Age: 78
Discharge: HOME | End: 2023-10-02
Payer: MEDICARE

## 2023-10-02 ENCOUNTER — ANESTHESIA (OUTPATIENT)
Dept: CT IMAGING | Age: 78
End: 2023-10-02
Payer: MEDICARE

## 2023-10-02 ENCOUNTER — HOSPITAL ENCOUNTER (OUTPATIENT)
Dept: GENERAL RADIOLOGY | Age: 78
Discharge: HOME | End: 2023-10-02
Payer: MEDICARE

## 2023-10-02 VITALS
TEMPERATURE: 97.2 F | DIASTOLIC BLOOD PRESSURE: 69 MMHG | OXYGEN SATURATION: 97 % | BODY MASS INDEX: 39.53 KG/M2 | SYSTOLIC BLOOD PRESSURE: 110 MMHG | WEIGHT: 246 LBS | HEIGHT: 66 IN | RESPIRATION RATE: 16 BRPM | HEART RATE: 63 BPM

## 2023-10-02 DIAGNOSIS — N28.89 RENAL MASS: ICD-10-CM

## 2023-10-02 DIAGNOSIS — N28.89 OTHER SPECIFIED DISORDERS OF KIDNEY AND URETER: ICD-10-CM

## 2023-10-02 LAB
INR PPP: 1.05 (ref 0.84–1.16)
PLATELET # BLD AUTO: 233 K/UL (ref 135–450)
PROTHROMBIN TIME: 13.7 SEC (ref 11.5–14.8)

## 2023-10-02 PROCEDURE — 85610 PROTHROMBIN TIME: CPT

## 2023-10-02 PROCEDURE — 85049 AUTOMATED PLATELET COUNT: CPT

## 2023-10-02 PROCEDURE — 71045 X-RAY EXAM CHEST 1 VIEW: CPT

## 2023-10-02 PROCEDURE — 2580000003 HC RX 258: Performed by: NURSE ANESTHETIST, CERTIFIED REGISTERED

## 2023-10-02 PROCEDURE — 7100000011 HC PHASE II RECOVERY - ADDTL 15 MIN

## 2023-10-02 PROCEDURE — 3700000000 HC ANESTHESIA ATTENDED CARE

## 2023-10-02 PROCEDURE — 2500000003 HC RX 250 WO HCPCS: Performed by: NURSE ANESTHETIST, CERTIFIED REGISTERED

## 2023-10-02 PROCEDURE — 3700000001 HC ADD 15 MINUTES (ANESTHESIA)

## 2023-10-02 PROCEDURE — 7100000001 HC PACU RECOVERY - ADDTL 15 MIN

## 2023-10-02 PROCEDURE — 7100000010 HC PHASE II RECOVERY - FIRST 15 MIN

## 2023-10-02 PROCEDURE — 7100000000 HC PACU RECOVERY - FIRST 15 MIN

## 2023-10-02 PROCEDURE — 6360000002 HC RX W HCPCS: Performed by: FAMILY MEDICINE

## 2023-10-02 PROCEDURE — 36415 COLL VENOUS BLD VENIPUNCTURE: CPT

## 2023-10-02 PROCEDURE — 2580000003 HC RX 258: Performed by: FAMILY MEDICINE

## 2023-10-02 PROCEDURE — 6360000002 HC RX W HCPCS: Performed by: NURSE ANESTHETIST, CERTIFIED REGISTERED

## 2023-10-02 PROCEDURE — 6360000002 HC RX W HCPCS

## 2023-10-02 PROCEDURE — C2618 PROBE/NEEDLE, CRYO: HCPCS

## 2023-10-02 RX ORDER — ROCURONIUM BROMIDE 10 MG/ML
INJECTION, SOLUTION INTRAVENOUS PRN
Status: DISCONTINUED | OUTPATIENT
Start: 2023-10-02 | End: 2023-10-02 | Stop reason: SDUPTHER

## 2023-10-02 RX ORDER — PROPOFOL 10 MG/ML
INJECTION, EMULSION INTRAVENOUS PRN
Status: DISCONTINUED | OUTPATIENT
Start: 2023-10-02 | End: 2023-10-02 | Stop reason: SDUPTHER

## 2023-10-02 RX ORDER — DEXAMETHASONE SODIUM PHOSPHATE 4 MG/ML
INJECTION, SOLUTION INTRA-ARTICULAR; INTRALESIONAL; INTRAMUSCULAR; INTRAVENOUS; SOFT TISSUE PRN
Status: DISCONTINUED | OUTPATIENT
Start: 2023-10-02 | End: 2023-10-02 | Stop reason: SDUPTHER

## 2023-10-02 RX ORDER — LABETALOL HYDROCHLORIDE 5 MG/ML
10 INJECTION, SOLUTION INTRAVENOUS
Status: DISCONTINUED | OUTPATIENT
Start: 2023-10-02 | End: 2023-10-03 | Stop reason: HOSPADM

## 2023-10-02 RX ORDER — PROCHLORPERAZINE EDISYLATE 5 MG/ML
5 INJECTION INTRAMUSCULAR; INTRAVENOUS
Status: DISCONTINUED | OUTPATIENT
Start: 2023-10-02 | End: 2023-10-03 | Stop reason: HOSPADM

## 2023-10-02 RX ORDER — ONDANSETRON 2 MG/ML
INJECTION INTRAMUSCULAR; INTRAVENOUS PRN
Status: DISCONTINUED | OUTPATIENT
Start: 2023-10-02 | End: 2023-10-02 | Stop reason: SDUPTHER

## 2023-10-02 RX ORDER — CEFAZOLIN SODIUM 1 G/3ML
INJECTION, POWDER, FOR SOLUTION INTRAMUSCULAR; INTRAVENOUS PRN
Status: DISCONTINUED | OUTPATIENT
Start: 2023-10-02 | End: 2023-10-02 | Stop reason: SDUPTHER

## 2023-10-02 RX ORDER — SODIUM CHLORIDE 0.9 % (FLUSH) 0.9 %
5-40 SYRINGE (ML) INJECTION PRN
Status: DISCONTINUED | OUTPATIENT
Start: 2023-10-02 | End: 2023-10-03 | Stop reason: HOSPADM

## 2023-10-02 RX ORDER — ONDANSETRON 4 MG/1
4 TABLET, FILM COATED ORAL 3 TIMES DAILY PRN
Qty: 15 TABLET | Refills: 0 | Status: SHIPPED | OUTPATIENT
Start: 2023-10-02

## 2023-10-02 RX ORDER — LIDOCAINE HYDROCHLORIDE 20 MG/ML
INJECTION, SOLUTION EPIDURAL; INFILTRATION; INTRACAUDAL; PERINEURAL PRN
Status: DISCONTINUED | OUTPATIENT
Start: 2023-10-02 | End: 2023-10-02 | Stop reason: SDUPTHER

## 2023-10-02 RX ORDER — SODIUM CHLORIDE, SODIUM LACTATE, POTASSIUM CHLORIDE, CALCIUM CHLORIDE 600; 310; 30; 20 MG/100ML; MG/100ML; MG/100ML; MG/100ML
INJECTION, SOLUTION INTRAVENOUS CONTINUOUS
Status: DISCONTINUED | OUTPATIENT
Start: 2023-10-02 | End: 2023-10-03 | Stop reason: HOSPADM

## 2023-10-02 RX ORDER — SODIUM CHLORIDE 9 MG/ML
INJECTION, SOLUTION INTRAVENOUS PRN
Status: DISCONTINUED | OUTPATIENT
Start: 2023-10-02 | End: 2023-10-03 | Stop reason: HOSPADM

## 2023-10-02 RX ORDER — LORAZEPAM 2 MG/ML
0.5 INJECTION INTRAMUSCULAR
Status: DISCONTINUED | OUTPATIENT
Start: 2023-10-02 | End: 2023-10-03 | Stop reason: HOSPADM

## 2023-10-02 RX ORDER — SODIUM CHLORIDE 0.9 % (FLUSH) 0.9 %
5-40 SYRINGE (ML) INJECTION EVERY 12 HOURS SCHEDULED
Status: DISCONTINUED | OUTPATIENT
Start: 2023-10-02 | End: 2023-10-03 | Stop reason: HOSPADM

## 2023-10-02 RX ORDER — FENTANYL CITRATE 50 UG/ML
25 INJECTION, SOLUTION INTRAMUSCULAR; INTRAVENOUS EVERY 5 MIN PRN
Status: DISCONTINUED | OUTPATIENT
Start: 2023-10-02 | End: 2023-10-03 | Stop reason: HOSPADM

## 2023-10-02 RX ORDER — HYDROMORPHONE HYDROCHLORIDE 1 MG/ML
0.5 INJECTION, SOLUTION INTRAMUSCULAR; INTRAVENOUS; SUBCUTANEOUS EVERY 5 MIN PRN
Status: DISCONTINUED | OUTPATIENT
Start: 2023-10-02 | End: 2023-10-03 | Stop reason: HOSPADM

## 2023-10-02 RX ORDER — ACETAMINOPHEN 325 MG/1
650 TABLET ORAL
Status: DISCONTINUED | OUTPATIENT
Start: 2023-10-02 | End: 2023-10-03 | Stop reason: HOSPADM

## 2023-10-02 RX ORDER — FENTANYL CITRATE 50 UG/ML
INJECTION, SOLUTION INTRAMUSCULAR; INTRAVENOUS PRN
Status: DISCONTINUED | OUTPATIENT
Start: 2023-10-02 | End: 2023-10-02 | Stop reason: SDUPTHER

## 2023-10-02 RX ORDER — IPRATROPIUM BROMIDE AND ALBUTEROL SULFATE 2.5; .5 MG/3ML; MG/3ML
1 SOLUTION RESPIRATORY (INHALATION)
Status: DISCONTINUED | OUTPATIENT
Start: 2023-10-02 | End: 2023-10-03 | Stop reason: HOSPADM

## 2023-10-02 RX ORDER — GLYCOPYRROLATE 0.2 MG/ML
INJECTION INTRAMUSCULAR; INTRAVENOUS PRN
Status: DISCONTINUED | OUTPATIENT
Start: 2023-10-02 | End: 2023-10-02 | Stop reason: SDUPTHER

## 2023-10-02 RX ORDER — ONDANSETRON 2 MG/ML
4 INJECTION INTRAMUSCULAR; INTRAVENOUS
Status: COMPLETED | OUTPATIENT
Start: 2023-10-02 | End: 2023-10-02

## 2023-10-02 RX ADMIN — ONDANSETRON 4 MG: 2 INJECTION INTRAMUSCULAR; INTRAVENOUS at 09:51

## 2023-10-02 RX ADMIN — GLYCOPYRROLATE 0.2 MG: 0.2 INJECTION INTRAMUSCULAR; INTRAVENOUS at 09:22

## 2023-10-02 RX ADMIN — SODIUM CHLORIDE, POTASSIUM CHLORIDE, SODIUM LACTATE AND CALCIUM CHLORIDE: 600; 310; 30; 20 INJECTION, SOLUTION INTRAVENOUS at 06:43

## 2023-10-02 RX ADMIN — ONDANSETRON 4 MG: 2 INJECTION INTRAMUSCULAR; INTRAVENOUS at 10:30

## 2023-10-02 RX ADMIN — HYDROMORPHONE HYDROCHLORIDE 0.5 MG: 1 INJECTION, SOLUTION INTRAMUSCULAR; INTRAVENOUS; SUBCUTANEOUS at 11:21

## 2023-10-02 RX ADMIN — FENTANYL CITRATE 50 MCG: 50 INJECTION, SOLUTION INTRAMUSCULAR; INTRAVENOUS at 10:10

## 2023-10-02 RX ADMIN — PROPOFOL 140 MG: 10 INJECTION, EMULSION INTRAVENOUS at 08:05

## 2023-10-02 RX ADMIN — ROCURONIUM BROMIDE 20 MG: 10 INJECTION, SOLUTION INTRAVENOUS at 09:02

## 2023-10-02 RX ADMIN — SUGAMMADEX 200 MG: 100 INJECTION, SOLUTION INTRAVENOUS at 10:03

## 2023-10-02 RX ADMIN — FENTANYL CITRATE 50 MCG: 50 INJECTION, SOLUTION INTRAMUSCULAR; INTRAVENOUS at 08:03

## 2023-10-02 RX ADMIN — PHENYLEPHRINE HYDROCHLORIDE 100 MCG: 10 INJECTION INTRAVENOUS at 08:42

## 2023-10-02 RX ADMIN — GLYCOPYRROLATE 0.2 MG: 0.2 INJECTION INTRAMUSCULAR; INTRAVENOUS at 08:15

## 2023-10-02 RX ADMIN — ROCURONIUM BROMIDE 50 MG: 10 INJECTION, SOLUTION INTRAVENOUS at 08:05

## 2023-10-02 RX ADMIN — DEXAMETHASONE SODIUM PHOSPHATE 4 MG: 4 INJECTION, SOLUTION INTRAMUSCULAR; INTRAVENOUS at 09:51

## 2023-10-02 RX ADMIN — LIDOCAINE HYDROCHLORIDE 100 MG: 20 INJECTION, SOLUTION EPIDURAL; INFILTRATION; INTRACAUDAL; PERINEURAL at 08:04

## 2023-10-02 RX ADMIN — PHENYLEPHRINE HYDROCHLORIDE 50 MCG/MIN: 10 INJECTION INTRAVENOUS at 08:44

## 2023-10-02 RX ADMIN — CEFAZOLIN SODIUM 2 G: 1 POWDER, FOR SOLUTION INTRAMUSCULAR; INTRAVENOUS at 08:18

## 2023-10-02 RX ADMIN — PHENYLEPHRINE HYDROCHLORIDE 100 MCG: 10 INJECTION INTRAVENOUS at 08:19

## 2023-10-02 ASSESSMENT — PAIN DESCRIPTION - PAIN TYPE
TYPE: SURGICAL PAIN

## 2023-10-02 ASSESSMENT — PAIN DESCRIPTION - DESCRIPTORS
DESCRIPTORS: SHARP
DESCRIPTORS: ACHING
DESCRIPTORS: SHARP

## 2023-10-02 ASSESSMENT — PAIN DESCRIPTION - LOCATION
LOCATION: FLANK
LOCATION: BACK
LOCATION: BACK

## 2023-10-02 ASSESSMENT — PAIN DESCRIPTION - FREQUENCY
FREQUENCY: OTHER (COMMENT)
FREQUENCY: INTERMITTENT

## 2023-10-02 ASSESSMENT — PAIN SCALES - GENERAL
PAINLEVEL_OUTOF10: 3
PAINLEVEL_OUTOF10: 7
PAINLEVEL_OUTOF10: 4

## 2023-10-02 ASSESSMENT — PAIN - FUNCTIONAL ASSESSMENT
PAIN_FUNCTIONAL_ASSESSMENT: ACTIVITIES ARE NOT PREVENTED
PAIN_FUNCTIONAL_ASSESSMENT: ACTIVITIES ARE NOT PREVENTED
PAIN_FUNCTIONAL_ASSESSMENT: 0-10
PAIN_FUNCTIONAL_ASSESSMENT: ACTIVITIES ARE NOT PREVENTED

## 2023-10-02 ASSESSMENT — PAIN DESCRIPTION - ORIENTATION
ORIENTATION: LEFT
ORIENTATION: LOWER;LEFT
ORIENTATION: LEFT

## 2023-10-02 NOTE — PROGRESS NOTES
Dressing entirely covered now but color lighter. Looks like drainage absorbed into rest of dressing so now more pink than red.

## 2023-10-02 NOTE — PROGRESS NOTES
Dr Allison Connell at bedside. Talked with patient. OK to have ice chips and clear liquids. Dressing status reported - OK/expected. 2 scripts delivered - Zofran and Percocet. Plans to talk to patient and sister when in [facility].

## 2023-10-02 NOTE — ANESTHESIA PRE PROCEDURE
Right ventricular systolic function is moderately reduced . Hypokinetic free wall of the right ventricle. The aortic root is mildly dilated. The ascending aorta is mildly dilated. Aortic valve appears sclerotic but opens adequately. Mild mitral and pulmonic regurgitation. Moderate tricuspid regurgitation. Systolic pulmonary artery pressure (SPAP) estimated at 52 mmHg (right atrial   pressure 8 mmHg), consistent with moderate pulmonary hypertension. Signature      ------------------------------------------------------------------   Electronically signed by Ameya Szymanski MD, [facility]   (Interpreting physician) on 06/30/2022 at 05:00 PM   ------------------------------------------------------------------         Anesthesia Plan      general     ASA 3       Induction: intravenous. MIPS: Postoperative opioids intended and Prophylactic antiemetics administered. Anesthetic plan and risks discussed with patient.                         Bill Jo MD   10/2/2023

## 2023-10-02 NOTE — DISCHARGE INSTRUCTIONS
No lifting over 15 lbs for 48 hours. Dressing may be removed after 48 hours. Resume regular diet. [facility]    There are potential side effects of anesthesia or sedation you may experience for the first 24 hours. These side effects include:    Confusion or Memory loss, Dizziness, or Delayed Reaction Times   [x]A responsible person should be with you for the next 24 hours. Do not operate any vehicles (automobiles, bicycles, motorcycles) or power tools or machinery for 24 hours. Do not sign any legal documents or make any legal decisions for 24 hours. Do not drink alcohol for 24 hours or while taking narcotic pain medication. Nausea    [x]Start with light diet and progress to your normal diet as you feel like eating. However, if you experience nausea or repeated episodes of vomiting which persist beyond 12-24 hours, notify your physician. Once nausea has passed, remember to keep drinking fluids. Difficulty Passing Urine  [x]Drink extra amounts of fluid today. Notify your physician if you have not urinated within 8 hours after your procedure or you feel uncomfortable. Irritated Throat from a Breathing Tube  [x]Drink extra amounts of fluid today. Lozenges may help. Muscle Aches  [x]You may experience some generalized body aches as your muscles recover from medications used to relax them during surgery. These will gradually subside. MEDICATION INSTRUCTIONS:  [x]Prescription(S) x   2  sent with you. Use as directed. When taking pain medications, you may experience the side effect of dizziness or drowsiness. Do not drink alcohol or drive when taking these medications. []Prescription(S) x    0      Called to Pharmacy Name and location:    [x]Give the list of your medications to your primary care physician on your next visit. Keep your med list updated and carry it with in case of emergencies.     [x] Narcotic pain medications can cause

## 2023-10-02 NOTE — PERIOP NOTE
Dressing to left flank saturated with blood, Dr Mackenzie Lua in to see patient and says its ok to put a clean Dsd and tegaderm over insertion site

## 2023-10-02 NOTE — PROGRESS NOTES
1022 Admitted to PACU from specials / CT. Connected to monitor. Report at bedside. Reports nausea and sharp pain when moves - but does not want pain med. Zofran given. Lights dimmed ice behind neck. CXR for 1100. NP. Bedrest for 3 hours. Some drainage already on dressing. Requested discharge instructions. Glasses on. Teeth in cup at bedside.

## 2023-10-02 NOTE — PROCEDURES
IR Brief Postoperative Note    Anjana Csineros  YOB: 1945  [id number]    Pre-operative Diagnosis: renal mass    Post-operative Diagnosis: Same    Procedure: L renal cryo    Anesthesia: gen    Surgeons/Assistants: dileep    Estimated Blood Loss: Minimal    Complications: none    Specimens: were not obtained    See full procedure dictation to follow      Corey Edward MD MD  10/2/2023

## 2023-10-02 NOTE — H&P
Surgical Pre-Operative Note  Procedure Pt. Scheduled for:renal ablation    Consent:The patient was counseled regarding the procedure, it's indications, risks, potential complications and alternatives and any questions were answered. Consent was obtained. Prior H&P dated 9/26/23      Lab Review:  CBC:   Lab Results   Component Value Date/Time    WBC 6.5 04/11/2023 02:00 PM    RBC 5.02 04/11/2023 02:00 PM    HGB 14.4 04/11/2023 02:00 PM    HCT 43.8 04/11/2023 02:00 PM    MCV 87.2 04/11/2023 02:00 PM    MCH 28.7 04/11/2023 02:00 PM    MCHC 32.9 04/11/2023 02:00 PM    RDW 14.3 04/11/2023 02:00 PM     10/02/2023 07:13 AM    MPV 9.2 04/11/2023 02:00 PM     Platelets:    Lab Results   Component Value Date/Time     10/02/2023 07:13 AM     PT/INR:    Lab Results   Component Value Date/Time    PROTIME 13.7 10/02/2023 07:28 AM    INR 1.05 10/02/2023 07:28 AM       Cardiographics  ECG: normal sinus rhythm, no blocks or conduction defects, no ischemic changes  Echocardiogram: not done    Imaging  Chest X-Ray:  n/a       77 y.o. female with planned surgery as above. Known risk factors for perioperative complications: None    Difficulty with intubation is not anticipated. Current medications which may produce withdrawal symptoms if withheld perioperatively: n/a        1. Preoperative workup as follows preoperative cross sectional imaging  2. Change in medication regimen before surgery: none, continue med regimen including morning of surgery, w/sip of water  3.  Deep vein thrombosis prophylaxis postoperatively:n/a  4. Other measures: n/a

## 2023-10-02 NOTE — ANESTHESIA POSTPROCEDURE EVALUATION
Department of Anesthesiology  Postprocedure Note    Patient: Una Saba  MRN: [id number]  YOB: 1945  Date of evaluation: 10/2/2023      Procedure Summary     Date: 10/02/23 Room / Location: [facility] Scan    Anesthesia Start: 0519 Anesthesia Stop: 1025    Procedure: CT CRYO ABLATION RENAL TUMOR Diagnosis:       Other specified disorders of kidney and ureter      Renal mass    Scheduled Providers: Juan Alberto Marrufo MD Responsible Provider: Juan Alberto Marrufo MD    Anesthesia Type: general ASA Status: 3          Anesthesia Type: No value filed.     Darshan Phase I: Darshan Score: 8    Darshan Phase II:        Anesthesia Post Evaluation    Patient location during evaluation: PACU  Level of consciousness: awake  Complications: no  Multimodal analgesia pain management approach

## 2023-10-11 DIAGNOSIS — E03.9 ACQUIRED HYPOTHYROIDISM: ICD-10-CM

## 2023-10-11 NOTE — TELEPHONE ENCOUNTER
Pt calling requesting refill of levothyroxine (SYNTHROID) 112 MCG tablet    Last written 9/11/23  Last OV 9/26/23  Next OV N/A    Please send to [facility]. Pt states she did not receive script from 9/11, only has 1 pill left. Please advise.

## 2023-10-12 DIAGNOSIS — F41.9 CHRONIC ANXIETY: ICD-10-CM

## 2023-10-13 RX ORDER — CLONAZEPAM 1 MG/1
1 TABLET ORAL NIGHTLY PRN
Qty: 30 TABLET | Refills: 0 | Status: SHIPPED | OUTPATIENT
Start: 2023-10-13 | End: 2024-01-11

## 2023-10-13 RX ORDER — LEVOTHYROXINE SODIUM 112 UG/1
112 TABLET ORAL DAILY
Qty: 90 TABLET | Refills: 3 | Status: SHIPPED | OUTPATIENT
Start: 2023-10-13

## 2024-02-17 DIAGNOSIS — I10 ESSENTIAL HYPERTENSION: ICD-10-CM

## 2024-02-17 DIAGNOSIS — F32.2 SEVERE SINGLE CURRENT EPISODE OF MAJOR DEPRESSIVE DISORDER, WITHOUT PSYCHOTIC FEATURES (HCC): ICD-10-CM

## 2024-02-17 DIAGNOSIS — M79.7 FIBROMYALGIA: ICD-10-CM

## 2024-02-17 DIAGNOSIS — F41.9 CHRONIC ANXIETY: ICD-10-CM

## 2024-02-17 DIAGNOSIS — K21.9 GASTROESOPHAGEAL REFLUX DISEASE WITHOUT ESOPHAGITIS: ICD-10-CM

## 2024-02-19 NOTE — TELEPHONE ENCOUNTER
Last OV: 9/26/2023  Next OV: Visit date not found    Next appointment due:    Last fill:  Refills:

## 2024-02-20 DIAGNOSIS — E03.9 ACQUIRED HYPOTHYROIDISM: ICD-10-CM

## 2024-02-20 RX ORDER — PANTOPRAZOLE SODIUM 20 MG/1
20 TABLET, DELAYED RELEASE ORAL DAILY
Qty: 90 TABLET | Refills: 0 | Status: SHIPPED | OUTPATIENT
Start: 2024-02-20

## 2024-02-20 RX ORDER — LISINOPRIL 20 MG/1
20 TABLET ORAL DAILY
Qty: 90 TABLET | Refills: 0 | Status: SHIPPED | OUTPATIENT
Start: 2024-02-20

## 2024-02-20 RX ORDER — LEVOTHYROXINE SODIUM 112 UG/1
112 TABLET ORAL DAILY
Qty: 90 TABLET | Refills: 0 | Status: SHIPPED | OUTPATIENT
Start: 2024-02-20

## 2024-02-20 RX ORDER — FUROSEMIDE 40 MG/1
40 TABLET ORAL DAILY
Qty: 90 TABLET | Refills: 0 | Status: SHIPPED | OUTPATIENT
Start: 2024-02-20

## 2024-02-20 RX ORDER — AMITRIPTYLINE HYDROCHLORIDE 25 MG/1
25 TABLET, FILM COATED ORAL
Qty: 90 TABLET | Refills: 0 | Status: SHIPPED | OUTPATIENT
Start: 2024-02-20

## 2024-02-20 RX ORDER — SERTRALINE HYDROCHLORIDE 25 MG/1
25 TABLET, FILM COATED ORAL DAILY
Qty: 90 TABLET | Refills: 3 | Status: SHIPPED | OUTPATIENT
Start: 2024-02-20

## 2024-02-20 RX ORDER — METOPROLOL TARTRATE 50 MG/1
TABLET, FILM COATED ORAL
Qty: 90 TABLET | Refills: 0 | Status: SHIPPED | OUTPATIENT
Start: 2024-02-20

## 2024-02-20 RX ORDER — POTASSIUM CHLORIDE 20 MEQ/1
TABLET, EXTENDED RELEASE ORAL
Qty: 90 TABLET | Refills: 3 | Status: SHIPPED | OUTPATIENT
Start: 2024-02-20

## 2024-05-07 DIAGNOSIS — K21.9 GASTROESOPHAGEAL REFLUX DISEASE WITHOUT ESOPHAGITIS: ICD-10-CM

## 2024-05-07 DIAGNOSIS — I10 ESSENTIAL HYPERTENSION: ICD-10-CM

## 2024-05-07 DIAGNOSIS — M79.7 FIBROMYALGIA: ICD-10-CM

## 2024-05-07 DIAGNOSIS — E03.9 ACQUIRED HYPOTHYROIDISM: ICD-10-CM

## 2024-05-07 RX ORDER — LISINOPRIL 20 MG/1
20 TABLET ORAL DAILY
Qty: 90 TABLET | Refills: 0 | Status: SHIPPED | OUTPATIENT
Start: 2024-05-07

## 2024-05-07 RX ORDER — FUROSEMIDE 40 MG/1
40 TABLET ORAL DAILY
Qty: 90 TABLET | Refills: 0 | Status: SHIPPED | OUTPATIENT
Start: 2024-05-07

## 2024-05-07 RX ORDER — AMITRIPTYLINE HYDROCHLORIDE 25 MG/1
25 TABLET, FILM COATED ORAL
Qty: 90 TABLET | Refills: 0 | Status: SHIPPED | OUTPATIENT
Start: 2024-05-07

## 2024-05-07 RX ORDER — LEVOTHYROXINE SODIUM 112 UG/1
112 TABLET ORAL DAILY
Qty: 90 TABLET | Refills: 0 | Status: SHIPPED | OUTPATIENT
Start: 2024-05-07

## 2024-05-07 RX ORDER — METOPROLOL TARTRATE 50 MG/1
TABLET, FILM COATED ORAL
Qty: 90 TABLET | Refills: 0 | Status: SHIPPED | OUTPATIENT
Start: 2024-05-07

## 2024-05-07 RX ORDER — PANTOPRAZOLE SODIUM 20 MG/1
20 TABLET, DELAYED RELEASE ORAL DAILY
Qty: 90 TABLET | Refills: 0 | Status: SHIPPED | OUTPATIENT
Start: 2024-05-07

## 2024-06-21 ENCOUNTER — TELEPHONE (OUTPATIENT)
Dept: INTERNAL MEDICINE CLINIC | Age: 79
End: 2024-06-21

## 2024-07-24 DIAGNOSIS — M79.7 FIBROMYALGIA: ICD-10-CM

## 2024-07-24 DIAGNOSIS — E03.9 ACQUIRED HYPOTHYROIDISM: ICD-10-CM

## 2024-07-24 DIAGNOSIS — I10 ESSENTIAL HYPERTENSION: ICD-10-CM

## 2024-07-24 RX ORDER — METOPROLOL TARTRATE 50 MG
TABLET ORAL
Qty: 90 TABLET | Refills: 3 | OUTPATIENT
Start: 2024-07-24

## 2024-07-24 RX ORDER — LISINOPRIL 20 MG/1
20 TABLET ORAL DAILY
Qty: 90 TABLET | Refills: 3 | OUTPATIENT
Start: 2024-07-24

## 2024-07-24 RX ORDER — FUROSEMIDE 40 MG
40 TABLET ORAL DAILY
Qty: 90 TABLET | Refills: 3 | OUTPATIENT
Start: 2024-07-24

## 2024-07-24 RX ORDER — LEVOTHYROXINE SODIUM 112 UG/1
112 TABLET ORAL DAILY
Qty: 90 TABLET | Refills: 3 | OUTPATIENT
Start: 2024-07-24

## 2024-09-25 ENCOUNTER — OFFICE VISIT (OUTPATIENT)
Dept: INTERNAL MEDICINE CLINIC | Age: 79
End: 2024-09-25

## 2024-09-25 VITALS
HEART RATE: 85 BPM | DIASTOLIC BLOOD PRESSURE: 70 MMHG | SYSTOLIC BLOOD PRESSURE: 98 MMHG | OXYGEN SATURATION: 95 % | HEIGHT: 66 IN | WEIGHT: 241 LBS | BODY MASS INDEX: 38.73 KG/M2

## 2024-09-25 DIAGNOSIS — R73.9 HYPERGLYCEMIA: ICD-10-CM

## 2024-09-25 DIAGNOSIS — N28.9 RENAL LESION: ICD-10-CM

## 2024-09-25 DIAGNOSIS — K21.9 GASTROESOPHAGEAL REFLUX DISEASE WITHOUT ESOPHAGITIS: ICD-10-CM

## 2024-09-25 DIAGNOSIS — I10 ESSENTIAL HYPERTENSION: ICD-10-CM

## 2024-09-25 DIAGNOSIS — F41.9 CHRONIC ANXIETY: ICD-10-CM

## 2024-09-25 DIAGNOSIS — E55.9 VITAMIN D DEFICIENCY: ICD-10-CM

## 2024-09-25 DIAGNOSIS — G57.93 NEUROPATHY INVOLVING BOTH LOWER EXTREMITIES: ICD-10-CM

## 2024-09-25 DIAGNOSIS — L30.9 DERMATITIS: ICD-10-CM

## 2024-09-25 DIAGNOSIS — E03.9 ACQUIRED HYPOTHYROIDISM: ICD-10-CM

## 2024-09-25 DIAGNOSIS — Z78.0 POST-MENOPAUSAL: ICD-10-CM

## 2024-09-25 DIAGNOSIS — M79.7 FIBROMYALGIA: ICD-10-CM

## 2024-09-25 DIAGNOSIS — Z23 NEED FOR PNEUMOCOCCAL VACCINE: ICD-10-CM

## 2024-09-25 DIAGNOSIS — F32.2 SEVERE SINGLE CURRENT EPISODE OF MAJOR DEPRESSIVE DISORDER, WITHOUT PSYCHOTIC FEATURES (HCC): ICD-10-CM

## 2024-09-25 DIAGNOSIS — Z23 NEEDS FLU SHOT: ICD-10-CM

## 2024-09-25 DIAGNOSIS — G89.29 CHRONIC PAIN OF LEFT KNEE: ICD-10-CM

## 2024-09-25 DIAGNOSIS — Z00.00 MEDICARE ANNUAL WELLNESS VISIT, SUBSEQUENT: Primary | ICD-10-CM

## 2024-09-25 DIAGNOSIS — M25.562 CHRONIC PAIN OF LEFT KNEE: ICD-10-CM

## 2024-09-25 LAB
25(OH)D3 SERPL-MCNC: 52.4 NG/ML
ALBUMIN SERPL-MCNC: 4.8 G/DL (ref 3.4–5)
ALBUMIN/GLOB SERPL: 2 {RATIO} (ref 1.1–2.2)
ALP SERPL-CCNC: 67 U/L (ref 40–129)
ALT SERPL-CCNC: 27 U/L (ref 10–40)
ANION GAP SERPL CALCULATED.3IONS-SCNC: 14 MMOL/L (ref 3–16)
AST SERPL-CCNC: 29 U/L (ref 15–37)
BASOPHILS # BLD: 0 K/UL (ref 0–0.2)
BASOPHILS NFR BLD: 0.5 %
BILIRUB SERPL-MCNC: 0.7 MG/DL (ref 0–1)
BUN SERPL-MCNC: 20 MG/DL (ref 7–20)
CALCIUM SERPL-MCNC: 10.4 MG/DL (ref 8.3–10.6)
CHLORIDE SERPL-SCNC: 99 MMOL/L (ref 99–110)
CHOLEST SERPL-MCNC: 129 MG/DL (ref 0–199)
CO2 SERPL-SCNC: 26 MMOL/L (ref 21–32)
CREAT SERPL-MCNC: 1.3 MG/DL (ref 0.6–1.2)
DEPRECATED RDW RBC AUTO: 14.3 % (ref 12.4–15.4)
EOSINOPHIL # BLD: 0.3 K/UL (ref 0–0.6)
EOSINOPHIL NFR BLD: 3.4 %
ERYTHROCYTE [SEDIMENTATION RATE] IN BLOOD BY WESTERGREN METHOD: 16 MM/HR (ref 0–30)
FOLATE SERPL-MCNC: 4.23 NG/ML (ref 4.78–24.2)
GFR SERPLBLD CREATININE-BSD FMLA CKD-EPI: 41 ML/MIN/{1.73_M2}
GLUCOSE SERPL-MCNC: 117 MG/DL (ref 70–99)
HCT VFR BLD AUTO: 47 % (ref 36–48)
HDLC SERPL-MCNC: 48 MG/DL (ref 40–60)
HGB BLD-MCNC: 15.7 G/DL (ref 12–16)
LDLC SERPL CALC-MCNC: 57 MG/DL
LYMPHOCYTES # BLD: 1.4 K/UL (ref 1–5.1)
LYMPHOCYTES NFR BLD: 16.6 %
MCH RBC QN AUTO: 29.3 PG (ref 26–34)
MCHC RBC AUTO-ENTMCNC: 33.4 G/DL (ref 31–36)
MCV RBC AUTO: 87.8 FL (ref 80–100)
MONOCYTES # BLD: 0.9 K/UL (ref 0–1.3)
MONOCYTES NFR BLD: 10.2 %
NEUTROPHILS # BLD: 5.8 K/UL (ref 1.7–7.7)
NEUTROPHILS NFR BLD: 69.3 %
PLATELET # BLD AUTO: 298 K/UL (ref 135–450)
PMV BLD AUTO: 9 FL (ref 5–10.5)
POTASSIUM SERPL-SCNC: 4.4 MMOL/L (ref 3.5–5.1)
PROT SERPL-MCNC: 7.2 G/DL (ref 6.4–8.2)
RBC # BLD AUTO: 5.35 M/UL (ref 4–5.2)
SODIUM SERPL-SCNC: 139 MMOL/L (ref 136–145)
TRIGL SERPL-MCNC: 121 MG/DL (ref 0–150)
TSH SERPL DL<=0.005 MIU/L-ACNC: 1.65 UIU/ML (ref 0.27–4.2)
VIT B12 SERPL-MCNC: 519 PG/ML (ref 211–911)
VLDLC SERPL CALC-MCNC: 24 MG/DL
WBC # BLD AUTO: 8.4 K/UL (ref 4–11)

## 2024-09-25 RX ORDER — POTASSIUM CHLORIDE 1500 MG/1
20 TABLET, EXTENDED RELEASE ORAL
Qty: 40 TABLET | Refills: 3 | Status: SHIPPED | OUTPATIENT
Start: 2024-09-25

## 2024-09-25 RX ORDER — SERTRALINE HYDROCHLORIDE 25 MG/1
25 TABLET, FILM COATED ORAL DAILY
Qty: 90 TABLET | Refills: 3 | Status: SHIPPED | OUTPATIENT
Start: 2024-09-25

## 2024-09-25 RX ORDER — CLONAZEPAM 1 MG/1
1 TABLET ORAL NIGHTLY PRN
Qty: 30 TABLET | Refills: 0 | Status: SHIPPED | OUTPATIENT
Start: 2024-09-25 | End: 2024-12-24

## 2024-09-25 RX ORDER — LEVOTHYROXINE SODIUM 112 UG/1
112 TABLET ORAL DAILY
Qty: 90 TABLET | Refills: 3 | Status: SHIPPED | OUTPATIENT
Start: 2024-09-25

## 2024-09-25 RX ORDER — TRIAMCINOLONE ACETONIDE 1 MG/G
CREAM TOPICAL 2 TIMES DAILY PRN
Qty: 45 G | Refills: 2 | Status: SHIPPED | OUTPATIENT
Start: 2024-09-25

## 2024-09-25 RX ORDER — FUROSEMIDE 40 MG
40 TABLET ORAL DAILY
Qty: 90 TABLET | Refills: 3 | Status: SHIPPED | OUTPATIENT
Start: 2024-09-25

## 2024-09-25 RX ORDER — LISINOPRIL 20 MG/1
20 TABLET ORAL DAILY
Qty: 90 TABLET | Refills: 3 | Status: SHIPPED | OUTPATIENT
Start: 2024-09-25

## 2024-09-25 RX ORDER — METOPROLOL TARTRATE 25 MG/1
25 TABLET, FILM COATED ORAL 2 TIMES DAILY
Qty: 180 TABLET | Refills: 3 | Status: SHIPPED | OUTPATIENT
Start: 2024-09-25

## 2024-09-25 SDOH — ECONOMIC STABILITY: FOOD INSECURITY: WITHIN THE PAST 12 MONTHS, YOU WORRIED THAT YOUR FOOD WOULD RUN OUT BEFORE YOU GOT MONEY TO BUY MORE.: NEVER TRUE

## 2024-09-25 SDOH — ECONOMIC STABILITY: FOOD INSECURITY: WITHIN THE PAST 12 MONTHS, THE FOOD YOU BOUGHT JUST DIDN'T LAST AND YOU DIDN'T HAVE MONEY TO GET MORE.: NEVER TRUE

## 2024-09-25 SDOH — ECONOMIC STABILITY: INCOME INSECURITY: HOW HARD IS IT FOR YOU TO PAY FOR THE VERY BASICS LIKE FOOD, HOUSING, MEDICAL CARE, AND HEATING?: NOT HARD AT ALL

## 2024-09-25 ASSESSMENT — LIFESTYLE VARIABLES
HOW OFTEN DO YOU HAVE A DRINK CONTAINING ALCOHOL: NEVER
HOW MANY STANDARD DRINKS CONTAINING ALCOHOL DO YOU HAVE ON A TYPICAL DAY: PATIENT DOES NOT DRINK

## 2024-09-25 ASSESSMENT — PATIENT HEALTH QUESTIONNAIRE - PHQ9
9. THOUGHTS THAT YOU WOULD BE BETTER OFF DEAD, OR OF HURTING YOURSELF: NOT AT ALL
5. POOR APPETITE OR OVEREATING: NOT AT ALL
1. LITTLE INTEREST OR PLEASURE IN DOING THINGS: NOT AT ALL
SUM OF ALL RESPONSES TO PHQ QUESTIONS 1-9: 0
3. TROUBLE FALLING OR STAYING ASLEEP: NOT AT ALL
SUM OF ALL RESPONSES TO PHQ9 QUESTIONS 1 & 2: 0
SUM OF ALL RESPONSES TO PHQ QUESTIONS 1-9: 0
10. IF YOU CHECKED OFF ANY PROBLEMS, HOW DIFFICULT HAVE THESE PROBLEMS MADE IT FOR YOU TO DO YOUR WORK, TAKE CARE OF THINGS AT HOME, OR GET ALONG WITH OTHER PEOPLE: NOT DIFFICULT AT ALL
4. FEELING TIRED OR HAVING LITTLE ENERGY: NOT AT ALL
2. FEELING DOWN, DEPRESSED OR HOPELESS: NOT AT ALL
7. TROUBLE CONCENTRATING ON THINGS, SUCH AS READING THE NEWSPAPER OR WATCHING TELEVISION: NOT AT ALL
SUM OF ALL RESPONSES TO PHQ QUESTIONS 1-9: 0
6. FEELING BAD ABOUT YOURSELF - OR THAT YOU ARE A FAILURE OR HAVE LET YOURSELF OR YOUR FAMILY DOWN: NOT AT ALL
SUM OF ALL RESPONSES TO PHQ QUESTIONS 1-9: 0
8. MOVING OR SPEAKING SO SLOWLY THAT OTHER PEOPLE COULD HAVE NOTICED. OR THE OPPOSITE, BEING SO FIGETY OR RESTLESS THAT YOU HAVE BEEN MOVING AROUND A LOT MORE THAN USUAL: NOT AT ALL

## 2024-09-25 ASSESSMENT — ENCOUNTER SYMPTOMS
RESPIRATORY NEGATIVE: 1
GASTROINTESTINAL NEGATIVE: 1

## 2024-09-26 LAB
ANA SER QL IA: NEGATIVE
EST. AVERAGE GLUCOSE BLD GHB EST-MCNC: 111.2 MG/DL
HBA1C MFR BLD: 5.5 %

## 2024-09-27 DIAGNOSIS — E53.8 FOLATE DEFICIENCY: Primary | ICD-10-CM

## 2024-09-27 RX ORDER — FOLIC ACID 1 MG/1
1 TABLET ORAL DAILY
Qty: 90 TABLET | Refills: 3 | Status: SHIPPED | OUTPATIENT
Start: 2024-09-27

## 2024-10-16 ENCOUNTER — HOSPITAL ENCOUNTER (OUTPATIENT)
Dept: CT IMAGING | Age: 79
Discharge: HOME | End: 2024-10-16
Payer: MEDICARE

## 2024-10-16 DIAGNOSIS — N28.9 RENAL LESION: ICD-10-CM

## 2024-10-16 PROCEDURE — 6360000004 HC RX CONTRAST MEDICATION: Performed by: NURSE PRACTITIONER

## 2024-10-16 PROCEDURE — 74177 CT ABD & PELVIS W/CONTRAST: CPT

## 2024-10-16 RX ORDER — IOPAMIDOL 755 MG/ML
75 INJECTION, SOLUTION INTRAVASCULAR
Status: COMPLETED | OUTPATIENT
Start: 2024-10-16 | End: 2024-10-16

## 2024-10-16 RX ADMIN — IOPAMIDOL 75 ML: 755 INJECTION, SOLUTION INTRAVENOUS at 15:51

## 2024-10-16 RX ADMIN — IOHEXOL 25 ML: 350 INJECTION, SOLUTION INTRAVENOUS at 15:50

## 2024-11-04 ENCOUNTER — PROCEDURE VISIT (OUTPATIENT)
Dept: NEUROLOGY | Age: 79
End: 2024-11-04

## 2024-11-04 DIAGNOSIS — R20.2 NUMBNESS AND TINGLING OF BOTH FEET: Primary | ICD-10-CM

## 2024-11-04 DIAGNOSIS — R20.0 NUMBNESS AND TINGLING OF BOTH FEET: Primary | ICD-10-CM

## 2024-11-04 NOTE — PATIENT INSTRUCTIONS
Verbal consent was obtained from patient and/or patient's advocate for in office procedure with Dr. Kristina Kim MD (EMG or EEG).

## 2024-11-04 NOTE — PROGRESS NOTES
Dear Bernardo Bradley, APRN - CNP  [address] [place] [address]      I had the pleasure of seeing your patient Stuart Almaguer today for EMG and NCV. I have attached a detailed summary below:    Electromyography report      [facility] Neurology  [facility] [facility], [address]  [place] [id number]      Patient: Stuart Almaguer    MR Number: [id number]  YOB: 1945  Date of Visit: 11/4/2024      Clinical history:  The patient is a 79 y.o. years old female with chronic feet dysesthesia.  Exam: No sensory deficit distally weakness.  2+ DTRs throughout.    Findings:   For full details about such findings, please see attached report. Needle examination was recorded using monopolar needle in selected muscles. Unless otherwise noted, the temperature of the limb was monitored and maintained between 32-36°C during the performance of the NCV testing.     Bilateral sural sensory responses were absent  Bilateral superficial peroneal sensory responses were absent  Left peroneal motor distal latency was prolonged with reduced amplitude and normal velocity  Right peroneal motor amplitude was mildly reduced with normal amplitude and conduction velocity  Bilateral tibial motor responses were normal  Needle examination of bilateral lower extremities in selected muscle was normal    Impression:    This test is abnormal.  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  No evidence of plexopathy or radiculopathy.    Kristina Reyes MD    Diplomate of the [employer] board of electrodiagnostics.

## 2024-11-21 DIAGNOSIS — G62.9 CHRONIC POLYNEUROPATHY: Primary | ICD-10-CM

## 2025-02-12 ENCOUNTER — OFFICE VISIT (OUTPATIENT)
Dept: NEUROLOGY | Age: 80
End: 2025-02-12
Payer: MEDICARE

## 2025-02-12 VITALS
HEART RATE: 83 BPM | HEIGHT: 66 IN | DIASTOLIC BLOOD PRESSURE: 88 MMHG | SYSTOLIC BLOOD PRESSURE: 124 MMHG | WEIGHT: 241 LBS | BODY MASS INDEX: 38.73 KG/M2

## 2025-02-12 DIAGNOSIS — E03.9 ACQUIRED HYPOTHYROIDISM: Chronic | ICD-10-CM

## 2025-02-12 DIAGNOSIS — I10 HTN (HYPERTENSION), BENIGN: ICD-10-CM

## 2025-02-12 DIAGNOSIS — R20.0 NUMBNESS AND TINGLING OF BOTH FEET: ICD-10-CM

## 2025-02-12 DIAGNOSIS — G62.9 POLYNEUROPATHY: Primary | ICD-10-CM

## 2025-02-12 DIAGNOSIS — R20.2 NUMBNESS AND TINGLING OF BOTH FEET: ICD-10-CM

## 2025-02-12 PROCEDURE — G8400 PT W/DXA NO RESULTS DOC: HCPCS | Performed by: PSYCHIATRY & NEUROLOGY

## 2025-02-12 PROCEDURE — 3079F DIAST BP 80-89 MM HG: CPT | Performed by: PSYCHIATRY & NEUROLOGY

## 2025-02-12 PROCEDURE — 1159F MED LIST DOCD IN RCRD: CPT | Performed by: PSYCHIATRY & NEUROLOGY

## 2025-02-12 PROCEDURE — G8417 CALC BMI ABV UP PARAM F/U: HCPCS | Performed by: PSYCHIATRY & NEUROLOGY

## 2025-02-12 PROCEDURE — 99204 OFFICE O/P NEW MOD 45 MIN: CPT | Performed by: PSYCHIATRY & NEUROLOGY

## 2025-02-12 PROCEDURE — 3074F SYST BP LT 130 MM HG: CPT | Performed by: PSYCHIATRY & NEUROLOGY

## 2025-02-12 PROCEDURE — 1160F RVW MEDS BY RX/DR IN RCRD: CPT | Performed by: PSYCHIATRY & NEUROLOGY

## 2025-02-12 PROCEDURE — 1090F PRES/ABSN URINE INCON ASSESS: CPT | Performed by: PSYCHIATRY & NEUROLOGY

## 2025-02-12 PROCEDURE — G8427 DOCREV CUR MEDS BY ELIG CLIN: HCPCS | Performed by: PSYCHIATRY & NEUROLOGY

## 2025-02-12 PROCEDURE — 1124F ACP DISCUSS-NO DSCNMKR DOCD: CPT | Performed by: PSYCHIATRY & NEUROLOGY

## 2025-02-12 PROCEDURE — 1036F TOBACCO NON-USER: CPT | Performed by: PSYCHIATRY & NEUROLOGY

## 2025-02-12 RX ORDER — PREGABALIN 50 MG/1
50 CAPSULE ORAL 2 TIMES DAILY
Qty: 60 CAPSULE | Refills: 2 | Status: SHIPPED | OUTPATIENT
Start: 2025-02-12 | End: 2025-05-13

## 2025-02-12 NOTE — PROGRESS NOTES
Reflexes: Symmetric 2+ in both arms and 2+ in the knees and diminished in the ankle.  Coordination:no abnormal movements   Sensation: normal in the upper extremity and diminished to touch and vibration in her knees and feet  Slight leg swelling bilaterally severe.  Gait/Posture: steady gait with normal posturing and station.       Medical decision making:    A. Problems (any 1)    High:    [] Acute/Chronic Illness/injury posing threat to life or bodily function:    [] Severe exacerbation of chronic illness:      Moderate:    [x]     1 or more chronic illness with exacerbation, progression or side effect of treatment or  [x]     2 or more stable chronic illnesses or  []     1 acute illness with systemic symptoms       Mild:  []     1 stable chronic illness     ---------------------------------------------------------------------  B. Risk of Treatment (any 1)   [] Drugs/treatments that require intensive monitoring for toxicity include:      [x] Prescription drug management  ----------------------------------------------------------------------  [] High (any 2)  [x] Moderate (any 1)    C. Data (any 2 for high and any 1 for moderate)  [] Discussed management of the case with:    [] Imaging personally reviewed and interpreted, includes:    [x] Data Review (any 3)  [] Collateral history obtained from:    [x] All available Consultant notes were reviewed  [x] All current labs were reviewed and interpreted for clinical significance   [x] Appropriate follow-up test and labs were ordered    I personally reviewed and updated social history, past medical history, medications, allergy, surgical history, and family history as documented in the patient's electronic health records.       Labs and/or neuroimaging and other test results reviewed and discussed with the patient.    Reviewed notes from other physicians.   Provided patient education regarding risk, benefits and treatment options as well as adherence to medication

## 2025-02-12 NOTE — PATIENT INSTRUCTIONS
YOU MUST CONFIRM YOUR APPOINTMENT 1 DAY PRIOR OR IT WILL BE CANCELLED!!   Our office will call you 3 times the day prior to your appointment in an attempt to confirm.  Please return our call ASAP or confirm your appt through [facility] no later than 3 pm the day before your appointment.  If we do not hear back from you by 3 pm to confirm, your appointment will be cancelled & someone will be added into that slot from our wait list.      
glasses

## 2025-05-01 ENCOUNTER — OFFICE VISIT (OUTPATIENT)
Dept: NEUROLOGY | Age: 80
End: 2025-05-01
Payer: MEDICARE

## 2025-05-01 VITALS
HEART RATE: 76 BPM | BODY MASS INDEX: 39.99 KG/M2 | SYSTOLIC BLOOD PRESSURE: 130 MMHG | DIASTOLIC BLOOD PRESSURE: 76 MMHG | HEIGHT: 65 IN | RESPIRATION RATE: 16 BRPM | WEIGHT: 240 LBS

## 2025-05-01 DIAGNOSIS — G62.9 POLYNEUROPATHY: Primary | ICD-10-CM

## 2025-05-01 DIAGNOSIS — I10 HTN (HYPERTENSION), BENIGN: ICD-10-CM

## 2025-05-01 DIAGNOSIS — R20.2 NUMBNESS AND TINGLING OF BOTH FEET: ICD-10-CM

## 2025-05-01 DIAGNOSIS — R20.0 NUMBNESS AND TINGLING OF BOTH FEET: ICD-10-CM

## 2025-05-01 PROCEDURE — 1090F PRES/ABSN URINE INCON ASSESS: CPT | Performed by: PSYCHIATRY & NEUROLOGY

## 2025-05-01 PROCEDURE — 3078F DIAST BP <80 MM HG: CPT | Performed by: PSYCHIATRY & NEUROLOGY

## 2025-05-01 PROCEDURE — 1124F ACP DISCUSS-NO DSCNMKR DOCD: CPT | Performed by: PSYCHIATRY & NEUROLOGY

## 2025-05-01 PROCEDURE — G8427 DOCREV CUR MEDS BY ELIG CLIN: HCPCS | Performed by: PSYCHIATRY & NEUROLOGY

## 2025-05-01 PROCEDURE — 1160F RVW MEDS BY RX/DR IN RCRD: CPT | Performed by: PSYCHIATRY & NEUROLOGY

## 2025-05-01 PROCEDURE — 1159F MED LIST DOCD IN RCRD: CPT | Performed by: PSYCHIATRY & NEUROLOGY

## 2025-05-01 PROCEDURE — G8400 PT W/DXA NO RESULTS DOC: HCPCS | Performed by: PSYCHIATRY & NEUROLOGY

## 2025-05-01 PROCEDURE — G8417 CALC BMI ABV UP PARAM F/U: HCPCS | Performed by: PSYCHIATRY & NEUROLOGY

## 2025-05-01 PROCEDURE — 1036F TOBACCO NON-USER: CPT | Performed by: PSYCHIATRY & NEUROLOGY

## 2025-05-01 PROCEDURE — 3075F SYST BP GE 130 - 139MM HG: CPT | Performed by: PSYCHIATRY & NEUROLOGY

## 2025-05-01 PROCEDURE — 99214 OFFICE O/P EST MOD 30 MIN: CPT | Performed by: PSYCHIATRY & NEUROLOGY

## 2025-05-01 RX ORDER — PREGABALIN 50 MG/1
50 CAPSULE ORAL 2 TIMES DAILY
Qty: 60 CAPSULE | Refills: 5 | Status: SHIPPED | OUTPATIENT
Start: 2025-05-01 | End: 2025-10-28

## 2025-05-01 NOTE — PATIENT INSTRUCTIONS

## 2025-05-01 NOTE — PROGRESS NOTES
The patient came today for follow up regarding: polyneuropathy     Since the patient's last visit, she started low-dose Lyrica 50 Mg x 2.  She feels much better.  Pain is at least 60 to 70% better compared to last visit.  No side effect from Lyrica.  No excessive sedation.  She is not taking Klonopin.  She only takes amitriptyline as needed.  Blood pressure controlled on lisinopril and Lopressor.  Other review of system was unremarkable.          Exam:   Constitutional:   Vitals:    05/01/25 1017   BP: 130/76   BP Site: Left Lower Arm   Patient Position: Sitting   BP Cuff Size: Small Adult   Pulse: 76   Resp: 16   Weight: 108.9 kg (240 lb)   Height: 1.651 m (5' 5\")       General appearance:  Normal development and appear in no acute distress.   Mental Status:   Oriented to person, place, problem, and time.    Memory: Good immediate recall.  Intact remote memory  Normal attention span and concentration.  Language: intact naming, repeating and fluency   Good fund of Knowledge. Aware of current events and vocabulary   Cranial Nerves: Pupil round regular and reactive, extraocular muscles were intact, no ophthalmoplegia, face is symmetric, tongue is midline and rest of cranial nerves are normal    Musculoskeletal: no focal weakness in UE or LL.   Reflexes: Symmetric 2+ in both arms and legs  Coordination:no abnormal movements or dysmetria  Sensation: normal in all extremities.  Gait/Posture: steady gait with normal posturing and station.     ROS : A 10-14 system review of constitutional, cardiovascular, respiratory, GI, eyes, , ENT, musculoskeletal, endocrine, hematological, skin, SHEENT, genitourinary, psychiatric and neurologic systems was obtained and updated today which is unremarkable except as mentioned in my HPI      Medical decision making:    A. Problems (any 1)    High:    [] Acute/Chronic Illness/injury posing threat to life or bodily function:    [] Severe exacerbation of chronic illness:

## 2025-07-15 DIAGNOSIS — I10 ESSENTIAL HYPERTENSION: ICD-10-CM

## 2025-07-15 DIAGNOSIS — E03.9 ACQUIRED HYPOTHYROIDISM: ICD-10-CM

## 2025-07-15 DIAGNOSIS — M79.7 FIBROMYALGIA: ICD-10-CM

## 2025-07-16 RX ORDER — LISINOPRIL 20 MG/1
20 TABLET ORAL DAILY
Qty: 30 TABLET | Refills: 0 | Status: SHIPPED | OUTPATIENT
Start: 2025-07-16

## 2025-07-16 RX ORDER — METOPROLOL TARTRATE 25 MG/1
25 TABLET, FILM COATED ORAL 2 TIMES DAILY
Qty: 60 TABLET | Refills: 0 | Status: SHIPPED | OUTPATIENT
Start: 2025-07-16

## 2025-07-16 RX ORDER — LEVOTHYROXINE SODIUM 112 UG/1
112 TABLET ORAL DAILY
Qty: 30 TABLET | Refills: 0 | Status: SHIPPED | OUTPATIENT
Start: 2025-07-16

## 2025-07-16 RX ORDER — FUROSEMIDE 40 MG/1
40 TABLET ORAL DAILY
Qty: 30 TABLET | Refills: 0 | Status: SHIPPED | OUTPATIENT
Start: 2025-07-16

## 2025-08-07 ENCOUNTER — TELEPHONE (OUTPATIENT)
Dept: INTERNAL MEDICINE CLINIC | Age: 80
End: 2025-08-07

## 2025-08-20 DIAGNOSIS — I10 ESSENTIAL HYPERTENSION: ICD-10-CM

## 2025-08-20 DIAGNOSIS — F32.2 SEVERE SINGLE CURRENT EPISODE OF MAJOR DEPRESSIVE DISORDER, WITHOUT PSYCHOTIC FEATURES (HCC): ICD-10-CM

## 2025-08-20 DIAGNOSIS — F41.9 CHRONIC ANXIETY: ICD-10-CM

## 2025-08-21 RX ORDER — POTASSIUM CHLORIDE 1500 MG/1
TABLET, EXTENDED RELEASE ORAL
Qty: 12 TABLET | Refills: 0 | Status: SHIPPED | OUTPATIENT
Start: 2025-08-21

## 2025-08-21 RX ORDER — SERTRALINE HYDROCHLORIDE 25 MG/1
25 TABLET, FILM COATED ORAL DAILY
Qty: 30 TABLET | Refills: 0 | Status: SHIPPED | OUTPATIENT
Start: 2025-08-21

## 2025-09-01 DIAGNOSIS — I10 ESSENTIAL HYPERTENSION: ICD-10-CM

## 2025-09-01 DIAGNOSIS — M79.7 FIBROMYALGIA: ICD-10-CM

## 2025-09-01 DIAGNOSIS — E03.9 ACQUIRED HYPOTHYROIDISM: ICD-10-CM

## 2025-09-02 ENCOUNTER — OFFICE VISIT (OUTPATIENT)
Dept: INTERNAL MEDICINE CLINIC | Age: 80
End: 2025-09-02

## 2025-09-02 VITALS
DIASTOLIC BLOOD PRESSURE: 84 MMHG | OXYGEN SATURATION: 97 % | BODY MASS INDEX: 40.89 KG/M2 | HEIGHT: 65 IN | HEART RATE: 79 BPM | WEIGHT: 245.4 LBS | SYSTOLIC BLOOD PRESSURE: 124 MMHG

## 2025-09-02 DIAGNOSIS — E03.9 ACQUIRED HYPOTHYROIDISM: ICD-10-CM

## 2025-09-02 DIAGNOSIS — F41.9 CHRONIC ANXIETY: ICD-10-CM

## 2025-09-02 DIAGNOSIS — I48.0 PAROXYSMAL ATRIAL FIBRILLATION (HCC): ICD-10-CM

## 2025-09-02 DIAGNOSIS — M79.7 FIBROMYALGIA: ICD-10-CM

## 2025-09-02 DIAGNOSIS — M79.662 PAIN IN LEFT LOWER LEG: ICD-10-CM

## 2025-09-02 DIAGNOSIS — I10 ESSENTIAL HYPERTENSION: ICD-10-CM

## 2025-09-02 DIAGNOSIS — I50.42 CHRONIC COMBINED SYSTOLIC (CONGESTIVE) AND DIASTOLIC (CONGESTIVE) HEART FAILURE (HCC): ICD-10-CM

## 2025-09-02 DIAGNOSIS — Z00.00 MEDICARE ANNUAL WELLNESS VISIT, SUBSEQUENT: Primary | ICD-10-CM

## 2025-09-02 DIAGNOSIS — E53.8 FOLATE DEFICIENCY: ICD-10-CM

## 2025-09-02 DIAGNOSIS — F32.2 SEVERE SINGLE CURRENT EPISODE OF MAJOR DEPRESSIVE DISORDER, WITHOUT PSYCHOTIC FEATURES (HCC): ICD-10-CM

## 2025-09-02 RX ORDER — FUROSEMIDE 40 MG/1
40 TABLET ORAL DAILY
Qty: 30 TABLET | Refills: 11 | OUTPATIENT
Start: 2025-09-02

## 2025-09-02 RX ORDER — LEVOTHYROXINE SODIUM 112 UG/1
112 TABLET ORAL DAILY
Qty: 90 TABLET | Refills: 3 | Status: SHIPPED | OUTPATIENT
Start: 2025-09-02

## 2025-09-02 RX ORDER — LISINOPRIL 20 MG/1
20 TABLET ORAL DAILY
Qty: 90 TABLET | Refills: 3 | Status: SHIPPED | OUTPATIENT
Start: 2025-09-02

## 2025-09-02 RX ORDER — SERTRALINE HYDROCHLORIDE 25 MG/1
25 TABLET, FILM COATED ORAL DAILY
Qty: 90 TABLET | Refills: 3 | Status: SHIPPED | OUTPATIENT
Start: 2025-09-02

## 2025-09-02 RX ORDER — FOLIC ACID 1 MG/1
1 TABLET ORAL DAILY
Qty: 90 TABLET | Refills: 3 | Status: SHIPPED | OUTPATIENT
Start: 2025-09-02

## 2025-09-02 RX ORDER — LISINOPRIL 20 MG/1
20 TABLET ORAL DAILY
Qty: 30 TABLET | Refills: 11 | OUTPATIENT
Start: 2025-09-02

## 2025-09-02 RX ORDER — CLONAZEPAM 1 MG/1
1 TABLET ORAL NIGHTLY PRN
Qty: 30 TABLET | Refills: 0 | Status: SHIPPED | OUTPATIENT
Start: 2025-09-02 | End: 2025-12-01

## 2025-09-02 RX ORDER — FUROSEMIDE 40 MG/1
40 TABLET ORAL DAILY
Qty: 90 TABLET | Refills: 3 | Status: SHIPPED | OUTPATIENT
Start: 2025-09-02

## 2025-09-02 RX ORDER — METOPROLOL TARTRATE 25 MG/1
25 TABLET, FILM COATED ORAL 2 TIMES DAILY
Qty: 60 TABLET | Refills: 11 | OUTPATIENT
Start: 2025-09-02

## 2025-09-02 RX ORDER — LEVOTHYROXINE SODIUM 112 UG/1
112 TABLET ORAL DAILY
Qty: 30 TABLET | Refills: 11 | OUTPATIENT
Start: 2025-09-02

## 2025-09-02 RX ORDER — POTASSIUM CHLORIDE 1500 MG/1
20 TABLET, EXTENDED RELEASE ORAL
Qty: 40 TABLET | Refills: 3 | Status: SHIPPED | OUTPATIENT
Start: 2025-09-03

## 2025-09-02 RX ORDER — METOPROLOL TARTRATE 25 MG/1
25 TABLET, FILM COATED ORAL 2 TIMES DAILY
Qty: 180 TABLET | Refills: 3 | Status: SHIPPED | OUTPATIENT
Start: 2025-09-02

## 2025-09-02 SDOH — ECONOMIC STABILITY: FOOD INSECURITY: WITHIN THE PAST 12 MONTHS, YOU WORRIED THAT YOUR FOOD WOULD RUN OUT BEFORE YOU GOT MONEY TO BUY MORE.: NEVER TRUE

## 2025-09-02 SDOH — ECONOMIC STABILITY: FOOD INSECURITY: WITHIN THE PAST 12 MONTHS, THE FOOD YOU BOUGHT JUST DIDN'T LAST AND YOU DIDN'T HAVE MONEY TO GET MORE.: NEVER TRUE

## 2025-09-02 ASSESSMENT — PATIENT HEALTH QUESTIONNAIRE - PHQ9
SUM OF ALL RESPONSES TO PHQ QUESTIONS 1-9: 0
4. FEELING TIRED OR HAVING LITTLE ENERGY: NOT AT ALL
SUM OF ALL RESPONSES TO PHQ QUESTIONS 1-9: 0
6. FEELING BAD ABOUT YOURSELF - OR THAT YOU ARE A FAILURE OR HAVE LET YOURSELF OR YOUR FAMILY DOWN: NOT AT ALL
5. POOR APPETITE OR OVEREATING: NOT AT ALL
8. MOVING OR SPEAKING SO SLOWLY THAT OTHER PEOPLE COULD HAVE NOTICED. OR THE OPPOSITE, BEING SO FIGETY OR RESTLESS THAT YOU HAVE BEEN MOVING AROUND A LOT MORE THAN USUAL: NOT AT ALL
7. TROUBLE CONCENTRATING ON THINGS, SUCH AS READING THE NEWSPAPER OR WATCHING TELEVISION: NOT AT ALL
SUM OF ALL RESPONSES TO PHQ QUESTIONS 1-9: 0
9. THOUGHTS THAT YOU WOULD BE BETTER OFF DEAD, OR OF HURTING YOURSELF: NOT AT ALL
3. TROUBLE FALLING OR STAYING ASLEEP: NOT AT ALL
SUM OF ALL RESPONSES TO PHQ QUESTIONS 1-9: 0
2. FEELING DOWN, DEPRESSED OR HOPELESS: NOT AT ALL
1. LITTLE INTEREST OR PLEASURE IN DOING THINGS: NOT AT ALL
10. IF YOU CHECKED OFF ANY PROBLEMS, HOW DIFFICULT HAVE THESE PROBLEMS MADE IT FOR YOU TO DO YOUR WORK, TAKE CARE OF THINGS AT HOME, OR GET ALONG WITH OTHER PEOPLE: NOT DIFFICULT AT ALL

## 2025-09-04 ENCOUNTER — HOSPITAL ENCOUNTER (OUTPATIENT)
Age: 80
Discharge: HOME | End: 2025-09-04
Payer: MEDICARE

## 2025-09-04 ENCOUNTER — HOSPITAL ENCOUNTER (OUTPATIENT)
Dept: GENERAL RADIOLOGY | Age: 80
Discharge: HOME | End: 2025-09-04
Payer: MEDICARE

## 2025-09-04 DIAGNOSIS — M79.662 PAIN IN LEFT LOWER LEG: ICD-10-CM

## 2025-09-04 PROCEDURE — 73590 X-RAY EXAM OF LOWER LEG: CPT

## 2025-09-04 ASSESSMENT — ENCOUNTER SYMPTOMS
GASTROINTESTINAL NEGATIVE: 1
RESPIRATORY NEGATIVE: 1

## (undated) DEVICE — DEVICE SEAL L23CM NANO COAT MARYLAND JAW OPN DIV LIGASURE

## (undated) DEVICE — STAPLER INT L28CM 60MM 12 FIRING B FRM PWD + ECHELON FLX

## (undated) DEVICE — ULTRACLEAN ACCESSORY ELECTRODE, 6 INCH COATED BLADE WITH EXTENDED INSULATION: Brand: ULTRACLEAN

## (undated) DEVICE — APPLICATOR MEDICATED 26 CC SOLUTION HI LT ORNG CHLORAPREP

## (undated) DEVICE — COVER LT HNDL BLU PLAS

## (undated) DEVICE — SPONGE LAP W18XL18IN WHT COT 4 PLY FLD STRUNG RADPQ DISP ST

## (undated) DEVICE — 3M™ IOBAN™ 2 ANTIMICROBIAL INCISE DRAPE 6650EZ: Brand: IOBAN™ 2

## (undated) DEVICE — GOWN SIRUS NONREIN LG W/TWL: Brand: MEDLINE INDUSTRIES, INC.

## (undated) DEVICE — SUTURE ABSORBABLE MONOFILAMENT 0 TP1 60 IN VIO PDS + PDP991G

## (undated) DEVICE — SYRINGE MED 10ML TRNSLUC BRL PLUNG BLK MRK POLYPR CTRL

## (undated) DEVICE — MERCY FAIRFIELD TURNOVER KIT: Brand: MEDLINE INDUSTRIES, INC.

## (undated) DEVICE — SUTURE VCRL + SZ 3-0 L18IN ABSRB UD SH 1/2 CIR TAPERCUT NDL VCP864D

## (undated) DEVICE — SUTURE ABSORBABLE MONOFILAMENT 0 CTX 60 IN VIO PDS + PDP990G

## (undated) DEVICE — STAPLER SKIN H3.9MM WIRE DIA0.58MM CRWN 6.9MM 35 STPL ROT

## (undated) DEVICE — TOWEL,OR,DSP,ST,BLUE,DLX,10/PK,8PK/CS: Brand: MEDLINE

## (undated) DEVICE — PENCIL SMK EVAC TELSCP 3 M TBNG

## (undated) DEVICE — GLOVE SURG SZ 6.5 L11.2IN FNGR THK9.8MIL STRW LTX POLYMER

## (undated) DEVICE — SUTURE MCRYL + SZ 4-0 L27IN ABSRB UD L19MM PS-2 3/8 CIR MCP426H

## (undated) DEVICE — SHEET,DRAPE,53X77,STERILE: Brand: MEDLINE

## (undated) DEVICE — MAJOR SET UP PK

## (undated) DEVICE — RELOAD STPL H1.8-3.8MM REG THCK TISS G 6 ROW GRIPPING SURF

## (undated) DEVICE — HYPODERMIC SAFETY NEEDLE: Brand: MAGELLAN

## (undated) DEVICE — SUTURE PERMAHAND SZ 3-0 L18IN NONABSORBABLE BLK L26MM SH C013D

## (undated) DEVICE — BLANKET WRM W29.9XL79.1IN UP BODY FORC AIR MISTRAL-AIR

## (undated) DEVICE — SOLUTION IV IRRIG POUR BRL 0.9% SODIUM CHL 2F7124

## (undated) DEVICE — DRAPE,LAP,CHOLE,W/TROUGHS,STERILE: Brand: MEDLINE

## (undated) DEVICE — PICO 7 10CM X 30CM: Brand: PICO™ 7

## (undated) DEVICE — TOTAL TRAY, DB, 100% SILI FOLEY, 16FR 10: Brand: MEDLINE

## (undated) DEVICE — GLOVE ORTHO 8   MSG9480